# Patient Record
Sex: MALE | Race: WHITE | Employment: FULL TIME | URBAN - METROPOLITAN AREA
[De-identification: names, ages, dates, MRNs, and addresses within clinical notes are randomized per-mention and may not be internally consistent; named-entity substitution may affect disease eponyms.]

---

## 2021-06-14 ENCOUNTER — OFFICE VISIT (OUTPATIENT)
Dept: PRIMARY CARE CLINIC | Age: 36
End: 2021-06-14
Payer: COMMERCIAL

## 2021-06-14 VITALS
BODY MASS INDEX: 27.35 KG/M2 | TEMPERATURE: 98 F | DIASTOLIC BLOOD PRESSURE: 67 MMHG | WEIGHT: 220 LBS | SYSTOLIC BLOOD PRESSURE: 108 MMHG | HEIGHT: 75 IN | HEART RATE: 75 BPM | OXYGEN SATURATION: 100 %

## 2021-06-14 DIAGNOSIS — Z11.59 NEED FOR HEPATITIS C SCREENING TEST: ICD-10-CM

## 2021-06-14 DIAGNOSIS — Z98.890 HISTORY OF REPAIR OF ANTERIOR CRUCIATE LIGAMENT OF LEFT KNEE: ICD-10-CM

## 2021-06-14 DIAGNOSIS — N53.9 MALE SEXUAL DYSFUNCTION: ICD-10-CM

## 2021-06-14 DIAGNOSIS — Z91.09 ENVIRONMENTAL ALLERGIES: ICD-10-CM

## 2021-06-14 DIAGNOSIS — M25.562 CHRONIC PAIN OF LEFT KNEE: ICD-10-CM

## 2021-06-14 DIAGNOSIS — M25.362 INSTABILITY OF KNEE JOINT, LEFT: Primary | ICD-10-CM

## 2021-06-14 DIAGNOSIS — Z00.00 ANNUAL PHYSICAL EXAM: ICD-10-CM

## 2021-06-14 DIAGNOSIS — G89.29 CHRONIC PAIN OF LEFT KNEE: ICD-10-CM

## 2021-06-14 PROCEDURE — 99203 OFFICE O/P NEW LOW 30 MIN: CPT | Performed by: FAMILY MEDICINE

## 2021-06-14 PROCEDURE — 99385 PREV VISIT NEW AGE 18-39: CPT | Performed by: FAMILY MEDICINE

## 2021-06-14 RX ORDER — FLUTICASONE PROPIONATE 50 MCG
1 SPRAY, SUSPENSION (ML) NASAL DAILY
COMMUNITY

## 2021-06-14 RX ORDER — SILDENAFIL 100 MG/1
100 TABLET, FILM COATED ORAL DAILY PRN
Qty: 10 TABLET | Refills: 1 | Status: SHIPPED | OUTPATIENT
Start: 2021-06-14 | End: 2022-08-11 | Stop reason: ALTCHOICE

## 2021-06-14 ASSESSMENT — PATIENT HEALTH QUESTIONNAIRE - PHQ9
SUM OF ALL RESPONSES TO PHQ QUESTIONS 1-9: 0
SUM OF ALL RESPONSES TO PHQ9 QUESTIONS 1 & 2: 0
SUM OF ALL RESPONSES TO PHQ QUESTIONS 1-9: 0
2. FEELING DOWN, DEPRESSED OR HOPELESS: 0
SUM OF ALL RESPONSES TO PHQ QUESTIONS 1-9: 0
1. LITTLE INTEREST OR PLEASURE IN DOING THINGS: 0

## 2021-06-14 ASSESSMENT — ENCOUNTER SYMPTOMS
COUGH: 0
ABDOMINAL PAIN: 0
SORE THROAT: 0
NAUSEA: 0
SHORTNESS OF BREATH: 0

## 2021-06-15 DIAGNOSIS — Z00.00 ANNUAL PHYSICAL EXAM: ICD-10-CM

## 2021-06-15 DIAGNOSIS — Z11.59 NEED FOR HEPATITIS C SCREENING TEST: ICD-10-CM

## 2021-06-15 PROBLEM — D69.6 TEMPORARY LOW PLATELET COUNT (HCC): Status: ACTIVE | Noted: 2021-06-15

## 2021-06-15 LAB
A/G RATIO: 2 (ref 1.1–2.2)
ALBUMIN SERPL-MCNC: 4.7 G/DL (ref 3.4–5)
ALP BLD-CCNC: 53 U/L (ref 40–129)
ALT SERPL-CCNC: 23 U/L (ref 10–40)
ANION GAP SERPL CALCULATED.3IONS-SCNC: 11 MMOL/L (ref 3–16)
AST SERPL-CCNC: 25 U/L (ref 15–37)
BASOPHILS ABSOLUTE: 0 K/UL (ref 0–0.2)
BASOPHILS RELATIVE PERCENT: 1 %
BILIRUB SERPL-MCNC: 0.6 MG/DL (ref 0–1)
BUN BLDV-MCNC: 15 MG/DL (ref 7–20)
CALCIUM SERPL-MCNC: 9.4 MG/DL (ref 8.3–10.6)
CHLORIDE BLD-SCNC: 105 MMOL/L (ref 99–110)
CHOLESTEROL, FASTING: 192 MG/DL (ref 0–199)
CO2: 26 MMOL/L (ref 21–32)
CREAT SERPL-MCNC: 0.9 MG/DL (ref 0.9–1.3)
EOSINOPHILS ABSOLUTE: 0.1 K/UL (ref 0–0.6)
EOSINOPHILS RELATIVE PERCENT: 3.1 %
GFR AFRICAN AMERICAN: >60
GFR NON-AFRICAN AMERICAN: >60
GLOBULIN: 2.3 G/DL
GLUCOSE BLD-MCNC: 92 MG/DL (ref 70–99)
HCT VFR BLD CALC: 45.8 % (ref 40.5–52.5)
HDLC SERPL-MCNC: 47 MG/DL (ref 40–60)
HEMOGLOBIN: 15.5 G/DL (ref 13.5–17.5)
HEPATITIS C ANTIBODY INTERPRETATION: NORMAL
LDL CHOLESTEROL CALCULATED: 116 MG/DL
LYMPHOCYTES ABSOLUTE: 1.4 K/UL (ref 1–5.1)
LYMPHOCYTES RELATIVE PERCENT: 39 %
MCH RBC QN AUTO: 30.7 PG (ref 26–34)
MCHC RBC AUTO-ENTMCNC: 33.9 G/DL (ref 31–36)
MCV RBC AUTO: 90.5 FL (ref 80–100)
MONOCYTES ABSOLUTE: 0.3 K/UL (ref 0–1.3)
MONOCYTES RELATIVE PERCENT: 9.3 %
NEUTROPHILS ABSOLUTE: 1.7 K/UL (ref 1.7–7.7)
NEUTROPHILS RELATIVE PERCENT: 47.6 %
PDW BLD-RTO: 13.7 % (ref 12.4–15.4)
PLATELET # BLD: 128 K/UL (ref 135–450)
PMV BLD AUTO: 11.1 FL (ref 5–10.5)
POTASSIUM SERPL-SCNC: 4.2 MMOL/L (ref 3.5–5.1)
RBC # BLD: 5.06 M/UL (ref 4.2–5.9)
SODIUM BLD-SCNC: 142 MMOL/L (ref 136–145)
TOTAL PROTEIN: 7 G/DL (ref 6.4–8.2)
TRIGLYCERIDE, FASTING: 143 MG/DL (ref 0–150)
VLDLC SERPL CALC-MCNC: 29 MG/DL
WBC # BLD: 3.6 K/UL (ref 4–11)

## 2021-06-16 ENCOUNTER — OFFICE VISIT (OUTPATIENT)
Dept: ORTHOPEDIC SURGERY | Age: 36
End: 2021-06-16
Payer: COMMERCIAL

## 2021-06-16 VITALS — HEIGHT: 74 IN | BODY MASS INDEX: 28.23 KG/M2 | TEMPERATURE: 98 F | WEIGHT: 220 LBS

## 2021-06-16 DIAGNOSIS — M25.562 LEFT KNEE PAIN, UNSPECIFIED CHRONICITY: Primary | ICD-10-CM

## 2021-06-16 DIAGNOSIS — M17.12 PATELLOFEMORAL ARTHRITIS OF LEFT KNEE: ICD-10-CM

## 2021-06-16 DIAGNOSIS — S83.512A CHRONIC RUPTURE OF ANTERIOR CRUCIATE LIGAMENT OF LEFT KNEE: ICD-10-CM

## 2021-06-16 PROCEDURE — 99204 OFFICE O/P NEW MOD 45 MIN: CPT | Performed by: ORTHOPAEDIC SURGERY

## 2021-06-16 ASSESSMENT — ENCOUNTER SYMPTOMS
CONSTIPATION: 0
EYE ITCHING: 0
EYE PAIN: 0
WHEEZING: 0
DIARRHEA: 0
VOMITING: 0

## 2021-06-16 NOTE — PROGRESS NOTES
Date:  2021    Name:  Sultana Marcial  Address:  [address] [address]    :  1985      Age:   37 y.o.    SSN:  [id number]      Medical Record Number:  [id number]    Reason for Visit:    Chief Complaint    Knee Pain (new patient left knee. )      DOS:2021     HPI: Sultana Marcial is a 37 y.o. male here today for dilation of his left knee. Patient states 8 years ago he underwent an ACL reconstruction using hamstring autograft by Dr. Candace Mann. He states postoperatively he never felt 100% which he says he never got his full range of motion or full strength and did have feelings of instability after the surgery. He does admit to an injury where he felt a pop in the postoperative period but states he was evaluated by his physician and told that everything was okay. Since that time he has continued to have feelings of instability, loss of range of motion and pain. Locates the pain to the anterior aspect of the knee and states pain is worse with stairs. States when walking or doing daily activities does not have much issues however he cannot do any sort of cutting or cytocide activities his knee feels unstable. Today he is just modified his activities to avoid these situations were his knee is aggravated. Has not tried any real icing or anti-inflammatory medications at this point. ROS: All systems reviewed on patient intake form. Pertinent items are noted in HPI.         Past Medical History:   Diagnosis Date    Environmental allergies 2021        Past Surgical History:   Procedure Laterality Date    ANTERIOR CRUCIATE LIGAMENT REPAIR  3/22/2012    left knee    INGUINAL HERNIA REPAIR Right        Family History   Problem Relation Age of Onset    Arrhythmia Mother         A Fib    No Known Problems Brother     Heart Disease Maternal Grandfather        Social History     Socioeconomic History    Marital status:      Spouse name: Not on file    Number of children: 0 oriented x 3,  normal,  no focal deficits noted. Normal affect. Eyes: sclera clear  Ears: Normal external ear  Mouth:  No perioral lesions  Pulm: Respirations unlabored and regular  Pulse: Regular rate    Skin: Warm, well perfused        Left knee exam    Gait: No use of assistive devices. No antalgic gait. Alignment: normal alignment. Inspection/skin: Skin is intact without erythema or ecchymosis. No gross deformity. Palpation: Moderate patellofemoral crepitation, no medial or lateral joint line tenderness    Range of Motion: There is full range of motion. Strength: Quadriceps atrophy noted. Contralateral extremity    Effusion: No effusion or swelling present. Ligamentous stability: Positive Lachman, grade 2 pivot    Neurologic and vascular: Skin is warm and well-perfused. Sensation is intact to light-touch. Special tests: Negative Eh sign, positive patellar compression. Right knee exam    Gait: No use of assistive devices. No antalgic gait. Alignment: normal alignment. Inspection/skin: Skin is intact without erythema or ecchymosis. No gross deformity. Palpation: no crepitus. no joint line tenderness present. Range of Motion: There is full range of motion. Strength: Normal quadriceps development. Effusion: No effusion or swelling present. Ligamentous stability: No cruciate or collateral ligament instability. Neurologic and vascular: Skin is warm and well-perfused. Sensation is intact to light-touch. Special tests: Negative Eh sign. Diagnostics:  Radiology:     Radiographs were obtained and reviewed in the office; 4 views: bilateral PA, bilateral Gurmeet Heron, bilateral [employer] AND left lateral    Impression: Patient has bilateral lateral patellar tilt with lateral patellofemoral joint space narrowing. Otherwise has maintained medial lateral joint spaces. No other obvious bony abnormalities.   Graft tunnels in good

## 2021-06-23 ENCOUNTER — OFFICE VISIT (OUTPATIENT)
Dept: ORTHOPEDIC SURGERY | Age: 36
End: 2021-06-23
Payer: COMMERCIAL

## 2021-06-23 VITALS — WEIGHT: 220 LBS | TEMPERATURE: 97.8 F | HEIGHT: 74 IN | BODY MASS INDEX: 28.23 KG/M2

## 2021-06-23 DIAGNOSIS — M17.12 PATELLOFEMORAL ARTHRITIS OF LEFT KNEE: Primary | ICD-10-CM

## 2021-06-23 DIAGNOSIS — M25.362: ICD-10-CM

## 2021-06-23 PROCEDURE — 99214 OFFICE O/P EST MOD 30 MIN: CPT | Performed by: ORTHOPAEDIC SURGERY

## 2021-06-23 NOTE — PROGRESS NOTES
Chief Complaint  Follow-up (MRI RESULTS LEFT KNEE)      History of Present Illness:  Becka Garcia is a pleasant 37 y.o. male who presents today for follow up evaluation of left knee pain. He is here to review MRI results. Patient states 8 years ago he underwent an ACL reconstruction using hamstring autograft by Dr. Mercy Orr. He states postoperatively he never felt 100% which he says he never got his full range of motion or full strength and did have feelings of instability after the surgery. He does admit to an injury where he felt a pop in the postoperative period but states he was evaluated by his physician and told that everything was okay.       Since that time he has continued to have feelings of instability, loss of range of motion and pain. Locates the pain to the anterior aspect of the knee and states pain is worse with stairs. States when walking or doing daily activities does not have much issues however he cannot do any sort of cutting or cytocide activities his knee feels unstable. Today he is just modified his activities to avoid these situations where his knee is aggravated. Pain has been unchanged over the last 3 years. Has not tried any real icing or anti-inflammatory medications at this point. Medical History:  Patient's medications, allergies, past medical, surgical, social and family histories were reviewed and updated as appropriate. Pertinent items are noted in HPI  Review of systems reviewed from Patient History Form completed today and available in the patient's chart under the Media tab.          Pain Assessment  Location of Pain: Knee  Location Modifiers: Left  Severity of Pain: 5  Quality of Pain: Dull, Aching  Duration of Pain: Persistent  Frequency of Pain: Intermittent  Aggravating Factors: Stairs (running, lateral)  Limiting Behavior: Yes  Relieving Factors: Rest  Result of Injury: Yes  Work-Related Injury: No  Are there other pain locations you wish to document?: No    Past Medical History:   Diagnosis Date    Environmental allergies 6/14/2021        Past Surgical History:   Procedure Laterality Date    ANTERIOR CRUCIATE LIGAMENT REPAIR  3/22/2012    left knee    INGUINAL HERNIA REPAIR Right        Family History   Problem Relation Age of Onset    Arrhythmia Mother         A Fib    No Known Problems Brother     Heart Disease Maternal Grandfather        Social History     Socioeconomic History    Marital status:      Spouse name: None    Number of children: 0    Years of education: None    Highest education level: None   Occupational History    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, helping startups   Tobacco Use    Smoking status: Never Smoker    Smokeless tobacco: Never Used   Substance and Sexual Activity    Alcohol use: Yes     Alcohol/week: 0.0 standard drinks     Types: 5 - 8 Cans of beer per week     Comment: weekends    Drug use: No    Sexual activity: Yes     Partners: Female   Other Topics Concern    None   Social History Narrative    None     Social Determinants of Health     Financial Resource Strain:     Difficulty of Paying Living Expenses:    Food Insecurity:     Worried About Running Out of Food in the Last Year:     Ran Out of Food in the Last Year:    Transportation Needs:     Lack of Transportation (Medical):      Lack of Transportation (Non-Medical):    Physical Activity:     Days of Exercise per Week:     Minutes of Exercise per Session:    Stress:     Feeling of Stress :    Social Connections:     Frequency of Communication with Friends and Family:     Frequency of Social Gatherings with Friends and Family:     Attends Hoahaoism Services:     Active Member of Clubs or Organizations:     Attends Club or Organization Meetings:     Marital Status:    Intimate Partner Violence:     Fear of Current or Ex-Partner:     Emotionally Abused:     Physically Abused:     Sexually Abused:        Current Outpatient Medications   Medication Sig Dispense Refill    Cetirizine HCl (ZYRTEC PO) Take by mouth      fluticasone (FLONASE) 50 MCG/ACT nasal spray 1 spray by Each Nostril route daily      sildenafil (VIAGRA) 100 MG tablet Take 1 tablet by mouth daily as needed for Erectile Dysfunction 10 tablet 1     No current facility-administered medications for this visit. No Known Allergies    Vital signs:  Temp 97.8 °F (36.6 °C)   Ht 6' 2\" (1.88 m)   Wt 220 lb (99.8 kg)   BMI 28.25 kg/m²              LEFT Knee Exam:     Gait: No use of assistive devices. No antalgic gait. Alignment: normal alignment. Inspection/skin: Skin is intact without erythema or ecchymosis. No gross deformity. Healed surgical incisions. Palpation: Marked patella crepitus. no joint line tenderness present. Range of Motion: There is full range of motion. Strength: Significant quad atrophy. Effusion: No effusion or swelling present. Ligamentous stability: No cruciate or collateral ligament instability. Neurologic and vascular: Skin is warm and well-perfused. Sensation is intact to light-touch. Special tests: Negative Eh sign. Grade 2A Pivot. RIGHT Knee Exam:    Gait: No use of assistive devices. No antalgic gait. Alignment: normal alignment. Inspection/skin: Skin is intact without erythema or ecchymosis. No gross deformity. Palpation: no crepitus. no joint line tenderness present. Range of Motion: There is full range of motion. Strength: Normal quadriceps development. Effusion: No effusion or swelling present. Ligamentous stability: No cruciate or collateral ligament instability. Neurologic and vascular: Skin is warm and well-perfused. Sensation is intact to light-touch. Special tests: Negative Eh sign. Radiology:     Pertinent imaging was interpreted and reviewed with the patient, both images and report.      MRI of the left knee dated 6/21/2021 was interpreted and reviewed with the patient today.  CONCLUSION:   1. Intermediate-grade-to-high-grade chondral irregularity involves the central and medial    patellar cartilage. Edema changes deep to this either reflect contusion or stress change with    the former favored. 2. Intact ACL graft. 3. No meniscal or collateral ligament tear. 4. Chronic MCL sprain, scarring and capsulitis. Assessment :  S/P ACL reconstruction with residual rotatory instability of left knee; Patellofemoral arthritis of left knee    Impression:  Encounter Diagnoses   Name Primary?  Patellofemoral arthritis of left knee Yes    Anteromedial rotatory instability of knee, left        Office Procedures:  No orders of the defined types were placed in this encounter. Plan: Pertinent imaging was reviewed. The etiology, natural history, and treatment options for the disorder were discussed. The roles of activity medication, antiinflammatories, injections, bracing, physical therapy, and surgical interventions were all described to the patient and questions were answered. MRI shows his graft intact but the placement is more vertical causing some rotatory instability. He also has significant chondral irregularity of the patellar cartilage. He has not had any recent formal treatment. He would like to get back to running in a straight line but his ultimate goal is to return to sports. I believe he is a candidate to start with formal, supervised physical therapy for rotatory instability and patellofemoral arthritis building quadriceps strength. He wishes to proceed. I will see him back in 1 month, sooner if symptoms worsen. Roby Ferris is in agreement with this plan. All questions were answered to patient's satisfaction and was encouraged to call with any further questions. Total time spent for evaluation, education and development of treatment plan: 39 minutes        Alejandra VIRK ATC, am scribing for and in the presence of Dr. Elisa Garcia. 06/23/21 10:13 AM Alejandra Barney ATC    I attest that I met personally with the patient, performed the described exam, reviewed the radiographic studies and medical records associated with this patient and supervised the services that are described above.      Luis Copeland MD

## 2021-06-23 NOTE — LETTER
PRESCRIPTION FOR PHYSICAL THERAPY    Patient Name: Becka Garcia MRN: [id number]  DOS: 6/23/2021   Diagnosis:   1. Patellofemoral arthritis of left knee    2. Anteromedial rotatory instability of knee, left                           Surgical Procedure:          Surgical Date:   Goal:  [x]Decrease Pain and/or Swelling [x]Increase ROM and/or Flexibility     []Increase Function                           [x]Increase Strength and/or Endurance   []Other   Evaluation:  [x]Evaluation and Treatment []KT-1000   []Isokinetic Exam   []Preoperative Eval    Recommended Modalities:  [x]Modalities of Choice      []HCVS            []Electrical Stimulation     [] Remove Dressing  []Ultrasound        []TENS/TNS    [] Lumbar Traction           [] Cervical Traction   []Phonophoresis         []Hot Pack/Cold Pack   []PT Treatment, Unlisted []Other:  Therapeutic Exercises:    []Isometrics    []Range of Motion []Progressive Exer. []Balance Coordination   []Flexibility  []ROM Limited  []Total Hip Replacement   []Passive  []ROM Full   []Total Knee Replacement  []Active Assisted    []Shoulder Impingement Prog  []Active   []Tennis Elbow Program   []Capsular Shift Regular        []Isokinetics      []Spine Program   []Straight Leg Raises  [] Gait    []Fixation                   [] Supine                                              [] Running   [] Extension   [] Prone   [] Throwing   [] Stabilization   [] AB    [] Moroccan Kevon Shireen   [] AD      [] Spine Eval   [] Cervical Eval  [] Conditioning   [] Lumbar  [] Stationary Bike   [] [employer] Track   [] Lumbar Exer.  [] Stairmaster         [] Treadmill   [] Functional Cap [] Aquatic Prog.      [] Return to work    Treatment Program:  Frequency: [] 1x  [x] 2x  [] 3x  [] 4x  [] 5x week/month  Duration: [] 1  [] 2  [] 3  [x] 4  [] 5 week/month  Weight Bearing: [] Non  [] 1/4  [] 1/2  [] 3/4  [] Full  ROM: [] Restricted  [] Full  [x] Follow established:  Patellofemoral arthritis and rotatory instability following ACL reconstruction with hamstring graft      [] Other:

## 2021-06-30 ENCOUNTER — HOSPITAL ENCOUNTER (OUTPATIENT)
Dept: PHYSICAL THERAPY | Age: 36
Setting detail: THERAPIES SERIES
Discharge: HOME | End: 2021-06-30
Payer: COMMERCIAL

## 2021-06-30 PROCEDURE — 97110 THERAPEUTIC EXERCISES: CPT | Performed by: PHYSICAL THERAPIST

## 2021-06-30 PROCEDURE — 97161 PT EVAL LOW COMPLEX 20 MIN: CPT | Performed by: PHYSICAL THERAPIST

## 2021-06-30 NOTE — PLAN OF CARE
The [address] [facility],[address], [address] [facility], [address] [place], [place]     Physical Therapy Certification    Dear Referring Practitioner: John Earl,    We had the pleasure of evaluating the following patient for physical therapy services at [address] [place] [address]. A summary of our findings can be found in the initial assessment below. This includes our plan of care. If you have any questions or concerns regarding these findings, please do not hesitate to contact me at the office phone number checked above. Thank you for the referral.       Physician Signature:_______________________________Date:__________________  By signing above (or electronic signature), therapists plan is approved by physician    Patient: Ruth Roach   : 1985   MRN: [id number]  Referring Physician: Referring Practitioner: John Earl      Evaluation Date: 2021      Medical Diagnosis Information:  Diagnosis: Anteriomedial rotary instability of the left knee (M25.362), left knee patellofemoral arthritis (M17.12)   Treatment Diagnosis: left knee pain (M25.562), left knee instability (M25.362)                                         Insurance information: PT Insurance Information: [employer]     Precautions/ Contra-indications:     C-SSRS Triggered by Intake questionnaire (Past 2 wk assessment):   [x] No, Questionnaire did not trigger screening.   [] Yes, Patient intake triggered further evaluation      [] C-SSRS Screening completed  [] PCP notified via Plan of Care  [] Emergency services notified     Latex Allergy:  [x]NO      []YES  Preferred Language for Healthcare:   [x]English       []other:    SUBJECTIVE: Patient stated complaint: Patient reports he has a chronic history of knee pain since  after he tore his ACL playing basketball.  Had surgery to repair PT and did physical therapy, but never really felt like he got back to normal. Reports that he has felt a sense of instability in his knee. Cannot do higher level activities such as running or playing sports. Plays golf, but knee gets sore. Reports that the past couple of years he has noticed increased pain under the kneecap. MRI showed ACL is intact but the insertion was set higher than normal.     Relevant Medical History: no significant PMH  Functional Disability Index: LEFS 52/80 (35%)    Height: 6'2 Weight: 220  Pain Scale: 0-7/10   Easing factors: rest  Provocative factors: stairs, squatting, walking up/down inclines, running     Type: []Constant   [x]Intermittent  []Radiating []Localized []other:     Numbness/Tingling: denies N/T    Occupation/School: desk job    Living Status/Prior Level of Function: Independent with ADLs and IADLs, lives with wife in two level house; plays golf 2-3x/wk; walks dog for exercise; would like to get back to running and playing basketball if possible (currently unable)    OBJECTIVE:     ROM LEFT RIGHT   HIP Flex     HIP Abd     HIP Ext     HIP IR     HIP ER     Knee ext 0 0   Knee Flex 129 133   Ankle PF     Ankle DF     Ankle In     Ankle Ev     Strength  LEFT RIGHT   HIP Flexors 4/5 4+/5   HIP Abductors 4/5 4/5   HIP Ext     Hip ER 4-/5 4+/5   Knee EXT (quad) 4/5 5/5   Knee Flex (HS) 4/5 5/5   Ankle DF 4+/5 4+/5   Ankle PF     Ankle Inv     Ankle EV          Circumference  Mid apex  7 cm prox             Reflexes/Sensation: DNT this date, no neuro sxs   []Dermatomes/Myotomes intact    []Reflexes equal and normal bilaterally   []Other:    Joint mobility:    [x]Normal    []Hypo   []Hyper    Palpation: palpable tightness distal quad    Functional Mobility/Transfers: independent    Posture: fair    Bandages/Dressings/Incisions: N/A    Gait: (include devices/WB status) Ambulating with slight decrease in stance time LLE    Orthopedic Special Tests: none this date                       [x] Patient history, allergies, meds reviewed. Medical chart reviewed. See intake form.      Review Of Systems (ROS):  [x]Performed Review of systems (Integumentary, CardioPulmonary, Neurological) by intake and observation. Intake form has been scanned into medical record. Patient has been instructed to contact their primary care physician regarding ROS issues if not already being addressed at this time. Co-morbidities/Complexities (which will affect course of rehabilitation):   [x]None           Arthritic conditions   []Rheumatoid arthritis (M05.9)  []Osteoarthritis (M19.91)   Cardiovascular conditions   []Hypertension (I10)  []Hyperlipidemia (E78.5)  []Angina pectoris (I20)  []Atherosclerosis (I70)   Musculoskeletal conditions   []Disc pathology   []Congenital spine pathologies   []Prior surgical intervention  []Osteoporosis (M81.8)  []Osteopenia (M85.8)   Endocrine conditions   []Hypothyroid (E03.9)  []Hyperthyroid Gastrointestinal conditions   []Constipation (J41.26)   Metabolic conditions   []Morbid obesity (E66.01)  []Diabetes type 1(E10.65) or 2 (E11.65)   []Neuropathy (G60.9)     Pulmonary conditions   []Asthma (J45)  []Coughing   []COPD (J44.9)   Psychological Disorders  []Anxiety (F41.9)  []Depression (F32.9)   []Other:   []Other:          Barriers to/and or personal factors that will affect rehab potential:              [x]Age  []Sex              []Motivation/Lack of Motivation                        [x]Co-Morbidities              []Cognitive Function, education/learning barriers              []Environmental, home barriers              []profession/work barriers  []past PT/medical experience  []other:  Justification: should progress well    Falls Risk Assessment (30 days):   [x] Falls Risk assessed and no intervention required.   [] Falls Risk assessed and Patient requires intervention due to being higher risk   TUG score (>12s at risk):     [] Falls education provided, including         ASSESSMENT: Patient demonstrates decreased strength and stability in his LLE, limiting his ability to ascend/descend stairs pathology which may benefit from Dry needling      []other:      Prognosis/Rehab Potential:      []Excellent   [x]Good    []Fair   []Poor    Tolerance of evaluation/treatment:    []Excellent   [x]Good    []Fair   []Poor  Physical Therapy Evaluation Complexity Justification  [x] A history of present problem with:  [x] no personal factors and/or comorbidities that impact the plan of care;  []1-2 personal factors and/or comorbidities that impact the plan of care  []3 personal factors and/or comorbidities that impact the plan of care  [x] An examination of body systems using standardized tests and measures addressing any of the following: body structures and functions (impairments), activity limitations, and/or participation restrictions;:  [] a total of 1-2 or more elements   [x] a total of 3 or more elements   [] a total of 4 or more elements   [x] A clinical presentation with:  [x] stable and/or uncomplicated characteristics   [] evolving clinical presentation with changing characteristics  [] unstable and unpredictable characteristics;   [x] Clinical decision making of [x] low, [] moderate, [] high complexity using standardized patient assessment instrument and/or measurable assessment of functional outcome. [x] EVAL (LOW) 30361 (typically 20 minutes face-to-face)  [] EVAL (MOD) 74360 (typically 30 minutes face-to-face)  [] EVAL (HIGH) 38861 (typically 45 minutes face-to-face)  [] RE-EVAL       PLAN:   Frequency/Duration:  1 days per week for 8 Weeks:  Interventions:  [x]  Therapeutic exercise including: strength training, ROM, for Lower extremity and core   [x]  NMR activation and proprioception for LE, Glutes and Core   [x]  Manual therapy as indicated for LE, Hip and spine to include: Dry Needling/IASTM, STM, PROM, Gr I-IV mobilizations, manipulation.    [x] Modalities as needed that may include: thermal agents, E-stim, Biofeedback, US, iontophoresis as indicated  [x] Patient education on joint protection, postural re-education, activity modification, progression of HEP. HEP instruction:   Access Code: [email or web address]  URL: [email or web address]  Date: 06/30/2021  Prepared by: Chirag Gay    GOALS:  Patient stated goal: \"To be able to climb stairs without pain. To get back to running/basketball. \"  [] Progressing: [] Met: [] Not Met: [] Adjusted    Therapist goals for Patient:   Short Term Goals: To be achieved in: 2 weeks  1. Independent in HEP and progression per patient tolerance, in order to prevent re-injury. [] Progressing: [] Met: [] Not Met: [] Adjusted  2. Patient will have a decrease in pain to facilitate improvement in movement, function, and ADLs as indicated by Functional Deficits. [] Progressing: [] Met: [] Not Met: [] Adjusted    Long Term Goals: To be achieved in: 8 weeks  1. Disability index score of 16% or less for the LEFS to assist with reaching prior level of function. [] Progressing: [] Met: [] Not Met: [] Adjusted  2. Patient will demonstrate increased AROM of left knee flexion to 130 degrees to allow for proper joint functioning as indicated by patients Functional Deficits. [] Progressing: [] Met: [] Not Met: [] Adjusted  3. Patient will demonstrate an increase in Strength in left hip flexion, ER, and abduction to 4+/5 and knee flexion and extension to 5/5 to allow for proper functional mobility as indicated by patients Functional Deficits. [] Progressing: [] Met: [] Not Met: [] Adjusted  4. Patient will be able to ascend/descend stairs reciprocally without increased symptoms or restriction. [] Progressing: [] Met: [] Not Met: [] Adjusted  5.  Patient will be able to run one mile without increased symptoms or restriction or progress to [employer] as appropriate for return to higher level activities. (patient specific functional goal)    [] Progressing: [] Met: [] Not Met: [] Adjusted     Electronically signed by:  Ori Young PT, DPT [id number]

## 2021-06-30 NOTE — FLOWSHEET NOTE
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), left knee patellofemoral arthritis (M17.12)  · Treatment Diagnosis: left knee pain (M25.562), left knee instability (I24.263)  Insurance/Certification information:  PT Insurance Information: [employer]  Physician Information:  Referring Practitioner: Yohannes Joyce  Has the plan of care been signed (Y/N):        []  Yes  [x]  No     Date of Patient follow up with Physician:  2021      Is this a Progress Report:     []  Yes  [x]  No        If Yes:  Date Range for reporting period:  Beginnin2021  Ending    Progress report will be due (10 Rx or 30 days whichever is less):        Recertification will be due (POC Duration  / 90 days whichever is less):       Visit # Insurance Allowable Auth Required   In-person 1  [x]  Yes []  No    Telehealth   []  Yes []  No    Total          Functional Scale:  LEFS 52/80 (35%)    Date assessed:  2021      Therapy Diagnosis/Practice Pattern: D      Number of Comorbidities:  [x]0     []1-2    []3+    Latex Allergy:  [x]NO      []YES  Preferred Language for Healthcare:   [x]English       []other:      Pain level:  0-7/10     SUBJECTIVE:  See eval    OBJECTIVE: See eval   Observation:    Test measurements:      RESTRICTIONS/PRECAUTIONS:     Exercises/Interventions:     Therapeutic Ex (35702) Sets/sec Reps Notes/CUES   HS stretch tableside 30\" 3x    Standing gastroc stretch 30\" 3x    Prone quad stretch 30\" 3x    Quad sets 5\" 5x To perform at home   SLR flexion 2 10    SLR abd 2 10    Clamshells RTB 3\" 20x    Bridging with ABD 5\" 20x                Patient ed   HEP, POC, relationship between hip strength and knee stability, knee valgus with squatting and balance, GAP         Manual Intervention (37281)                                          NMR re-education (84120)   CUES NEEDED   SLB on airex 15\" 5x    Step to SLB Demo for progression if not painful                                               Therapeutic Activity (49737)                                          HEP instruction:   Access Code: [email or web address]  URL: [email or web address]  Date: 06/30/2021  Prepared by: Chelsey Kenny      Therapeutic Exercise and NMR EXR  [x] (39308) Provided verbal/tactile cueing for activities related to strengthening, flexibility, endurance, ROM for improvements in LE, proximal hip, and core control with self care, mobility, lifting, ambulation.  [] (45169) Provided verbal/tactile cueing for activities related to improving balance, coordination, kinesthetic sense, posture, motor skill, proprioception  to assist with LE, proximal hip, and core control in self care, mobility, lifting, ambulation and eccentric single leg control.      NMR and Therapeutic Activities:    [x] (04701 or 49888) Provided verbal/tactile cueing for activities related to improving balance, coordination, kinesthetic sense, posture, motor skill, proprioception and motor activation to allow for proper function of core, proximal hip and LE with self care and ADLs  [] (86997) Gait Re-education- Provided training and instruction to the patient for proper LE, core and proximal hip recruitment and positioning and eccentric body weight control with ambulation re-education including up and down stairs     Home Exercise Program:    [x] (13388) Reviewed/Progressed HEP activities related to strengthening, flexibility, endurance, ROM of core, proximal hip and LE for functional self-care, mobility, lifting and ambulation/stair navigation   [] (43111)Reviewed/Progressed HEP activities related to improving balance, coordination, kinesthetic sense, posture, motor skill, proprioception of core, proximal hip and LE for self care, mobility, lifting, and ambulation/stair navigation      Manual Treatments:  PROM / STM / Oscillations-Mobs:  G-I, II, III, IV (PA's, Inf., Post.)  [] (43725) Provided manual therapy to mobilize LE, proximal hip and/or LS spine soft tissue/joints for the purpose of modulating pain, promoting relaxation,  increasing ROM, reducing/eliminating soft tissue swelling/inflammation/restriction, improving soft tissue extensibility and allowing for proper ROM for normal function with self care, mobility, lifting and ambulation. Modalities:  None this date   [] GAME READY (VASO)- for significant edema, swelling, pain control. Charges  Timed Code Treatment Minutes: 24'   Total Treatment Minutes: 45'     [x] EVAL (LOW) 43589   [] EVAL (MOD) 00922  [] EVAL (HIGH) 17676   [] RE-EVAL     [x] KM(76708) x  2  [] IONTO  [] NMR (17286) x     [] VASO  [] Manual (94660) x      [] Other:  [] TA x      [] Mech Traction (32585)  [] ES(attended) (96037)      [] ES (un) (98729):     GOALS:  Patient stated goal: \"To be able to climb stairs without pain. To get back to running/basketball. \"  [] Progressing: [] Met: [] Not Met: [] Adjusted    Therapist goals for Patient:   Short Term Goals: To be achieved in: 2 weeks  1. Independent in HEP and progression per patient tolerance, in order to prevent re-injury. [] Progressing: [] Met: [] Not Met: [] Adjusted  2. Patient will have a decrease in pain to facilitate improvement in movement, function, and ADLs as indicated by Functional Deficits. [] Progressing: [] Met: [] Not Met: [] Adjusted    Long Term Goals: To be achieved in: 8 weeks  1. Disability index score of 16% or less for the LEFS to assist with reaching prior level of function. [] Progressing: [] Met: [] Not Met: [] Adjusted  2.  Patient will demonstrate increased AROM of left knee flexion to 130 degrees to allow for proper joint functioning as indicated by patients Functional Deficits. [] Progressing: [] Met: [] Not Met: [] Adjusted  3. Patient will demonstrate an increase in Strength in left hip flexion, ER, and abduction to 4+/5 and knee flexion and extension to 5/5 to allow for proper functional mobility as indicated by patients Functional Deficits. [] Progressing: [] Met: [] Not Met: [] Adjusted  4. Patient will be able to ascend/descend stairs reciprocally without increased symptoms or restriction. [] Progressing: [] Met: [] Not Met: [] Adjusted  5. Patient will be able to run one mile without increased symptoms or restriction or progress to [facility] as appropriate for return to higher level activities. (patient specific functional goal)    [] Progressing: [] Met: [] Not Met: [] Adjusted     Progression Towards Functional goals:  [] Patient is progressing as expected towards functional goals listed. [] Progression is slowed due to complexities listed. [] Progression has been slowed due to co-morbidities. [x] Plan just implemented, too soon to assess goals progression  [] Other:     Overall Progression Towards Functional goals/ Treatment Progress Update:  [] Patient is progressing as expected towards functional goals listed. [] Progression is slowed due to complexities/Impairments listed. [] Progression has been slowed due to co-morbidities.   [x] Plan just implemented, too soon to assess goals progression <30days   [] Goals require adjustment due to lack of progress  [] Patient is not progressing as expected and requires additional follow up with physician  [] Other    Prognosis for POC: [x] Good [] Fair  [] Poor      Patient requires continued skilled intervention: [x] Yes  [] No    Treatment/Activity Tolerance:  [x] Patient able to complete treatment  [] Patient limited by fatigue  [] Patient limited by pain    [] Patient limited by other medical complications  [] Other:     ASSESSMENT:  See eval     Return to Play: (if applicable)   [x]  Stage 1: Intro to Strength   []  Stage 2: Return to Run and Strength   []  Stage 3: Return to Jump and Strength   []  Stage 4: Dynamic Strength and Agility   []  Stage 5: Sport Specific Training     []  Ready to Return to Play, Meets All Above Stages   []  Not Ready for Return to Sports   Comments:                          PLAN: See eval  [] Continue per plan of care [] Alter current plan (see comments above)  [x] Plan of care initiated [] Hold pending MD visit [] Discharge      Electronically signed by:  Nioclette Stein PT, DPT [id number]     Note: If patient does not return for scheduled/ recommended follow up visits, this note will serve as a discharge from care along with most recent update on progress.

## 2021-07-14 ENCOUNTER — HOSPITAL ENCOUNTER (OUTPATIENT)
Dept: PHYSICAL THERAPY | Age: 36
Setting detail: THERAPIES SERIES
Discharge: HOME | End: 2021-07-14
Payer: COMMERCIAL

## 2021-07-14 PROCEDURE — 97140 MANUAL THERAPY 1/> REGIONS: CPT | Performed by: PHYSICAL THERAPIST

## 2021-07-14 PROCEDURE — 97110 THERAPEUTIC EXERCISES: CPT | Performed by: PHYSICAL THERAPIST

## 2021-07-14 NOTE — FLOWSHEET NOTE
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), left knee patellofemoral arthritis (M17.12)  · Treatment Diagnosis: left knee pain (M25.562), left knee instability (Q05.271)  Insurance/Certification information:  PT Insurance Information: [employer]  Physician Information:  Referring Practitioner: Jinx Bosworth  Has the plan of care been signed (Y/N):        [x]  Yes  []  No     Date of Patient follow up with Physician:  2021      Is this a Progress Report:     []  Yes  [x]  No        If Yes:  Date Range for reporting period:  Beginnin2021  Ending    Progress report will be due (10 Rx or 30 days whichever is less): 2510       Recertification will be due (POC Duration  / 90 days whichever is less):       Visit # Insurance Allowable Auth Required   In-person 2  [x]  Yes []  No    Telehealth   []  Yes []  No    Total          Functional Scale:  LEFS 52/80 (35%)    Date assessed:  2021      Therapy Diagnosis/Practice Pattern: D      Number of Comorbidities:  [x]0     []1-2    []3+    Latex Allergy:  [x]NO      []YES  Preferred Language for Healthcare:   [x]English       []other:      Pain level:  0-7/10     SUBJECTIVE:  Patient reports he has been doing the exercises since last visit. Most challenging is the leg lifts on his side. Knee does not feel much different at this time. Pain up and down.     OBJECTIVE:    Observation:    Test measurements: NT this date      RESTRICTIONS/PRECAUTIONS:     Exercises/Interventions:     Therapeutic Ex (52962) Sets/sec Reps Notes/CUES   HS stretch tableside 30\" 3x    Incline stretch 30\" 3x    Prone quad stretch 30\" 3x    To perform at home      SLR abd 2 15                Minisquats 3\" 2 x 10 Min pain initially; GTB added second set   LBW GVL  3 laps 15'    Monster walks GVL 3 laps 15'    Foam roll ITband/quad 2'     Patient ed   HEP Progression, foam roll to help reduce tightness and PF compression; hip activation and PF pain         Manual Intervention (91760)      IASTM to quad and IT band fanning and scanning 8'                                   NMR re-education (64846)   CUES NEEDED   SLB on airex 15\" 5x    LSD 3\" 10x    Glider posterior reach 3\" 10x  short range                                       Therapeutic Activity (57899)                                          HEP instruction:   Access Code: [email or web address]  URL: [email or web address]  Date: 06/30/2021  Prepared by: Ghazala Oconnell      Therapeutic Exercise and NMR EXR  [x] (13798) Provided verbal/tactile cueing for activities related to strengthening, flexibility, endurance, ROM for improvements in LE, proximal hip, and core control with self care, mobility, lifting, ambulation.  [] (41319) Provided verbal/tactile cueing for activities related to improving balance, coordination, kinesthetic sense, posture, motor skill, proprioception  to assist with LE, proximal hip, and core control in self care, mobility, lifting, ambulation and eccentric single leg control.      NMR and Therapeutic Activities:    [x] (50811 or 62418) Provided verbal/tactile cueing for activities related to improving balance, coordination, kinesthetic sense, posture, motor skill, proprioception and motor activation to allow for proper function of core, proximal hip and LE with self care and ADLs  [] (88083) Gait Re-education- Provided training and instruction to the patient for proper LE, core and proximal hip recruitment and positioning and eccentric body weight control with ambulation re-education including up and down stairs     Home Exercise Program:    [x] (25843) Reviewed/Progressed HEP activities related to strengthening, flexibility, endurance, ROM of core, proximal hip and LE for functional self-care, mobility, lifting and ambulation/stair navigation   [] (44174)Reviewed/Progressed HEP activities related to improving balance, coordination, kinesthetic sense, posture, motor skill, proprioception of core, proximal hip and LE for self care, mobility, lifting, and ambulation/stair navigation      Manual Treatments:  PROM / STM / Oscillations-Mobs:  G-I, II, III, IV (PA's, Inf., Post.)  [x] (57547) Provided manual therapy to mobilize LE, proximal hip and/or LS spine soft tissue/joints for the purpose of modulating pain, promoting relaxation,  increasing ROM, reducing/eliminating soft tissue swelling/inflammation/restriction, improving soft tissue extensibility and allowing for proper ROM for normal function with self care, mobility, lifting and ambulation. Modalities:  None this date   [] GAME READY (VASO)- for significant edema, swelling, pain control. Charges  Timed Code Treatment Minutes: 40'   Total Treatment Minutes: 36'     [] EVAL (LOW) 455 1011   [] EVAL (MOD) 36511  [] EVAL (HIGH) 17745   [] RE-EVAL     [x] QJ(32958) x  2  [] IONTO  [] NMR (54018) x     [] VASO  [x] Manual (83690) x 1     [] Other:  [] TA x      [] Mech Traction (52359)  [] ES(attended) (28062)      [] ES (un) (41082):     GOALS:  Patient stated goal: \"To be able to climb stairs without pain. To get back to running/basketball. \"  [] Progressing: [] Met: [] Not Met: [] Adjusted    Therapist goals for Patient:   Short Term Goals: To be achieved in: 2 weeks  1. Independent in HEP and progression per patient tolerance, in order to prevent re-injury. [] Progressing: [] Met: [] Not Met: [] Adjusted  2. Patient will have a decrease in pain to facilitate improvement in movement, function, and ADLs as indicated by Functional Deficits. [] Progressing: [] Met: [] Not Met: [] Adjusted    Long Term Goals:  To be achieved in: 8 weeks  1. Disability index score of 16% or less for the LEFS to assist with reaching prior level of function. [] Progressing: [] Met: [] Not Met: [] Adjusted  2. Patient will demonstrate increased AROM of left knee flexion to 130 degrees to allow for proper joint functioning as indicated by patients Functional Deficits. [] Progressing: [] Met: [] Not Met: [] Adjusted  3. Patient will demonstrate an increase in Strength in left hip flexion, ER, and abduction to 4+/5 and knee flexion and extension to 5/5 to allow for proper functional mobility as indicated by patients Functional Deficits. [] Progressing: [] Met: [] Not Met: [] Adjusted  4. Patient will be able to ascend/descend stairs reciprocally without increased symptoms or restriction. [] Progressing: [] Met: [] Not Met: [] Adjusted  5. Patient will be able to run one mile without increased symptoms or restriction or progress to [facility] as appropriate for return to higher level activities. (patient specific functional goal)    [] Progressing: [] Met: [] Not Met: [] Adjusted     Progression Towards Functional goals:  [] Patient is progressing as expected towards functional goals listed. [] Progression is slowed due to complexities listed. [] Progression has been slowed due to co-morbidities. [x] Plan just implemented, too soon to assess goals progression  [] Other:     Overall Progression Towards Functional goals/ Treatment Progress Update:  [] Patient is progressing as expected towards functional goals listed. [] Progression is slowed due to complexities/Impairments listed. [] Progression has been slowed due to co-morbidities.   [x] Plan just implemented, too soon to assess goals progression <30days   [] Goals require adjustment due to lack of progress  [] Patient is not progressing as expected and requires additional follow up with physician  [] Other    Prognosis for POC: [x] Good [] Fair  [] Poor      Patient requires continued skilled intervention: [x] Yes  [] No    Treatment/Activity Tolerance:  [x] Patient able to complete treatment  [] Patient limited by fatigue  [] Patient limited by pain    [] Patient limited by other medical complications  [] Other:     ASSESSMENT: Patient with some PF pain with functional quad exercises. Patient initially with some pain with minisquats, but dissipated with addition of TB for abduction. Unable to perform more than one set of LSD or posterior reach due to pain. Discussed with patient that it will take time to build strength in his quad and hip in order to reduce pain with higher level activities. Return to Play: (if applicable)   [x]  Stage 1: Intro to Strength   []  Stage 2: Return to Run and Strength   []  Stage 3: Return to Jump and Strength   []  Stage 4: Dynamic Strength and Agility   []  Stage 5: Sport Specific Training     []  Ready to Return to Play, Meets All Above Stages   []  Not Ready for Return to Sports   Comments:                          PLAN: See eval  [x] Continue per plan of care [] Alter current plan (see comments above)  [] Plan of care initiated [] Hold pending MD visit [] Discharge      Electronically signed by:  Catrachita Aguilar PT, DPT [id number]     Note: If patient does not return for scheduled/ recommended follow up visits, this note will serve as a discharge from care along with most recent update on progress.

## 2021-07-26 ENCOUNTER — HOSPITAL ENCOUNTER (OUTPATIENT)
Dept: PHYSICAL THERAPY | Age: 36
Setting detail: THERAPIES SERIES
Discharge: HOME | End: 2021-07-26
Payer: COMMERCIAL

## 2021-07-26 PROCEDURE — 97112 NEUROMUSCULAR REEDUCATION: CPT | Performed by: PHYSICAL THERAPIST

## 2021-07-26 PROCEDURE — 97110 THERAPEUTIC EXERCISES: CPT | Performed by: PHYSICAL THERAPIST

## 2021-07-26 PROCEDURE — 97140 MANUAL THERAPY 1/> REGIONS: CPT | Performed by: PHYSICAL THERAPIST

## 2021-07-26 NOTE — PROGRESS NOTES
The [address] [facility] and [address] [facility], [address] [facility], 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), left knee patellofemoral arthritis (M17.12)  · Treatment Diagnosis: left knee pain (M25.562), left knee instability (C76.625)  Insurance/Certification information:  PT Insurance Information: [employer]  Physician Information:  Referring Practitioner: Eliud Barragan  Has the plan of care been signed (Y/N):        [x]  Yes  []  No     Date of Patient follow up with Physician:  2021      Is this a Progress Report:     [x]  Yes  []  No        If Yes:  Date Range for reporting period:  Beginnin2021  Endin2021    Progress report will be due (10 Rx or 30 days whichever is less):        Recertification will be due (POC Duration  / 90 days whichever is less):       Visit # Insurance Allowable Auth Required   In-person 3  [x]  Yes []  No    Telehealth   []  Yes []  No    Total          Functional Scale:  LEFS 52/80 (35%)    Date assessed:  2021      Therapy Diagnosis/Practice Pattern: D      Number of Comorbidities:  [x]0     []1-2    []3+    Latex Allergy:  [x]NO      []YES  Preferred Language for Healthcare:   [x]English       []other:      Pain level:  0-2/10     SUBJECTIVE:  Patient reports he has been doing the exercises since last visit. Most challenging is the leg lifts on his side. Knee does not feel much different at this time. Pain up and down.     OBJECTIVE:    Observation:    Test measurements: AROM left knee flexion 130 deg, MMT: left hip flexion 4+/5, knee extension and flexion 4/5, hip abduction and ER 4/5    RESTRICTIONS/PRECAUTIONS:     Exercises/Interventions:     Therapeutic Ex (89585) Sets/sec Reps Notes/CUES   HS stretch tableside 30\" 3x    Incline stretch 30\" 3x    Prone quad stretch 30\" 3x    To perform at home               SB bridging GTB around thighs 5\" 20x    Minisquats 3\" 2 x 10    LBW GVL  3 laps 15'    Monster walks GVL 3 laps 15'        Patient ed   Progressing quad strength   Leg Press B 120#  Leg Press ecc 90# 2 10 ea    Manual Intervention ([id number])      IASTM to quad, IT band, and HS fanning and scanning 10'                                   NMR re-education (71286)   CUES NEEDED   SLB on airex with plyoback forward  SLB on airex with plyoback lateral   2  1 10  10    LSD 2\"  LSD 4\" 1   10x  5x Pain with 4\"    short range   Steamboats 1 20x ea                                  Therapeutic Activity (35949)                                          HEP instruction:   Access Code: [email or web address]  URL: [email or web address]  Date: 06/30/2021  Prepared by: Shawn Jackson      Therapeutic Exercise and NMR EXR  [x] (40446) Provided verbal/tactile cueing for activities related to strengthening, flexibility, endurance, ROM for improvements in LE, proximal hip, and core control with self care, mobility, lifting, ambulation.  [] (43918) Provided verbal/tactile cueing for activities related to improving balance, coordination, kinesthetic sense, posture, motor skill, proprioception  to assist with LE, proximal hip, and core control in self care, mobility, lifting, ambulation and eccentric single leg control.      NMR and Therapeutic Activities:    [x] (11382 or 89687) Provided verbal/tactile cueing for activities related to improving balance, coordination, kinesthetic sense, posture, motor skill, proprioception and motor activation to allow for proper function of core, proximal hip and LE with self care and ADLs  [] (45444) Gait Re-education- Provided training and instruction to the patient for proper LE, core and proximal hip recruitment and positioning and eccentric body weight control with ambulation re-education including up and down stairs     Home Exercise Program:    [x] (68659) Reviewed/Progressed HEP activities related to strengthening, flexibility, endurance, ROM of core, proximal hip and LE for functional self-care, mobility, lifting and ambulation/stair navigation   [] (12166)Reviewed/Progressed HEP activities related to improving balance, coordination, kinesthetic sense, posture, motor skill, proprioception of core, proximal hip and LE for self care, mobility, lifting, and ambulation/stair navigation      Manual Treatments:  PROM / STM / Oscillations-Mobs:  G-I, II, III, IV (PA's, Inf., Post.)  [x] (25757) Provided manual therapy to mobilize LE, proximal hip and/or LS spine soft tissue/joints for the purpose of modulating pain, promoting relaxation,  increasing ROM, reducing/eliminating soft tissue swelling/inflammation/restriction, improving soft tissue extensibility and allowing for proper ROM for normal function with self care, mobility, lifting and ambulation. Modalities:  None this date   [] GAME READY (VASO)- for significant edema, swelling, pain control. Charges  Timed Code Treatment Minutes: 40'   Total Treatment Minutes: 36'     [] EVAL (LOW) 455 1011   [] EVAL (MOD) 99667  [] EVAL (HIGH) 09706   [] RE-EVAL     [x] RZ(86972) x  1  [] IONTO  [x] NMR (81737) x 1    [] VASO  [x] Manual (64037) x 1     [] Other:  [] TA x      [] Mech Traction (80189)  [] ES(attended) (49181)      [] ES (un) (67821):     GOALS:  Patient stated goal: \"To be able to climb stairs without pain. To get back to running/basketball. \"  [] Progressing: [] Met: [] Not Met: [] Adjusted    Therapist goals for Patient:   Short Term Goals: To be achieved in: 2 weeks  1. Independent in HEP and progression per patient tolerance, in order to prevent re-injury. [] Progressing: [x] Met: [] Not Met: [] Adjusted  2.  Patient will have a decrease in pain to facilitate improvement in movement, function, and ADLs as with physician  [] Other    Prognosis for POC: [x] Good [] Fair  [] Poor      Patient requires continued skilled intervention: [x] Yes  [] No    Treatment/Activity Tolerance:  [x] Patient able to complete treatment  [] Patient limited by fatigue  [] Patient limited by pain    [] Patient limited by other medical complications  [] Other:     ASSESSMENT: Patient able to perform squats without pain this date. Still with pain and crepitus with single leg functional quad exercises. Demonstrates improvement in hip strength since initial visit, but still with weakness noted in both quads and hamstrings. Discussed the relationship between quad/hamstring strength and knee stability. Patient would benefit from continued skilled PT to address limitations. Return to Play: (if applicable)   [x]  Stage 1: Intro to Strength   []  Stage 2: Return to Run and Strength   []  Stage 3: Return to Jump and Strength   []  Stage 4: Dynamic Strength and Agility   []  Stage 5: Sport Specific Training     []  Ready to Return to Play, Meets All Above Stages   []  Not Ready for Return to Sports   Comments:                          PLAN: See eval  [x] Continue per plan of care [] Alter current plan (see comments above)  [] Plan of care initiated [] Hold pending MD visit [] Discharge      Electronically signed by:  Nikita Espino PT, DPT [id number]     Note: If patient does not return for scheduled/ recommended follow up visits, this note will serve as a discharge from care along with most recent update on progress.

## 2021-07-28 ENCOUNTER — OFFICE VISIT (OUTPATIENT)
Dept: ORTHOPEDIC SURGERY | Age: 36
End: 2021-07-28
Payer: COMMERCIAL

## 2021-07-28 VITALS — TEMPERATURE: 98.7 F | BODY MASS INDEX: 27.59 KG/M2 | WEIGHT: 215 LBS | HEIGHT: 74 IN

## 2021-07-28 DIAGNOSIS — M25.362: ICD-10-CM

## 2021-07-28 DIAGNOSIS — M17.12 OSTEOARTHRITIS OF LEFT PATELLOFEMORAL JOINT: Primary | ICD-10-CM

## 2021-07-28 PROCEDURE — 99214 OFFICE O/P EST MOD 30 MIN: CPT | Performed by: ORTHOPAEDIC SURGERY

## 2021-07-28 NOTE — PROGRESS NOTES
Chief Complaint  Follow-up (left knee )      History of Present Illness:  Walt Mcintosh is a pleasant 37 y.o. male here for follow up regarding his left knee. As a reminder he underwent an ACL reconstruction using hamstring autograft by Dr. Rajinder Paz. He did admit to an injury where we felt a pop but was evaluated and told he was okay. Since then he has continued to have feelings of instability, loss of motion and pain. He was last seen about a month ago, and his symptoms and exam are indicative of patellofemoral osteoarthritis with some rotary instability. He was placed in formal supervised physical therapy. Since then states that he continues to improve still having some pain but improved week by week. He feels like he is gotten much stronger. Medical History:  Patient's medications, allergies, past medical, surgical, social and family histories were reviewed and updated as appropriate. Pain Assessment  Location of Pain: Knee  Location Modifiers: Left  Severity of Pain: 4  Quality of Pain: Sharp, Aching  Duration of Pain: A few minutes  Frequency of Pain: Intermittent  Aggravating Factors: Stairs (running)  Limiting Behavior: Yes  Relieving Factors: Rest  Result of Injury: Yes  Work-Related Injury: No  Are there other pain locations you wish to document?: No  ROS: Review of systems reviewed from Patient History Form completed today and available in the patient's chart under the Media tab. Pertinent items are noted in HPI  Review of systems reviewed from Patient History Form completed today and available in the patient's chart under the Media tab. Vital Signs:  Temp 98.7 °F (37.1 °C)   Ht 6' 2\" (1.88 m)   Wt 215 lb (97.5 kg)   BMI 27.60 kg/m²       Left knee examination:    Gait: No use of assistive devices. No antalgic gait. Alignment: normal alignment. Inspection/skin: Skin is intact without erythema or ecchymosis. No gross deformity. Palpation: marked crepitus.  no joint line tenderness present. Range of Motion: There is full range of motion. Strength: Quadriceps weakness    Effusion: No effusion or swelling present. Ligamentous stability: No cruciate or collateral ligament instability. Neurologic and vascular: Skin is warm and well-perfused. Sensation is intact to light-touch. Special tests: Negative Eh sign. Grade 1A pivot      Right knee examination:    Gait: No use of assistive devices. No antalgic gait. Alignment: normal alignment. Inspection/skin: Skin is intact without erythema or ecchymosis. No gross deformity. Palpation: mild crepitus. no joint line tenderness present. Range of Motion: There is full range of motion. Strength: Normal quadriceps development. Effusion: No effusion or swelling present. Ligamentous stability: No cruciate or collateral ligament instability. Neurologic and vascular: Skin is warm and well-perfused. Sensation is intact to light-touch. Special tests: Negative Eh sign. Small glide with pivot      Radiology:       Pertinent imaging was interpreted and reviewed with the patient today, both images and report. MRI of the left knee dated 6/21/2021 was interpreted and reviewed with the patient today. CONCLUSION:   1. Intermediate-grade-to-high-grade chondral irregularity involves the central and medial    patellar cartilage. Edema changes deep to this either reflect contusion or stress change with    the former favored. 2. Intact ACL graft. 3. No meniscal or collateral ligament tear. 4. Chronic MCL sprain, scarring and capsulitis.          Assessment :  38 yo male s/p ACL reconstruction with residual rotatory instability of left knee, patellofemoral OA left knee. Impression:  Encounter Diagnoses   Name Primary?     Osteoarthritis of left patellofemoral joint Yes    Anteromedial rotatory instability of knee, left        Office Procedures:  No orders of the defined types were placed in this encounter. Plan: Pertinent imaging was reviewed. The etiology, natural history, and treatment options for the disorder were discussed. The roles of activity medication, antiinflammatories, injections, bracing, physical therapy, and surgical interventions were all described to the patient and questions were answered. Patient has an intact graft with some rotatory instability. His primary issue is his chondral irregularity of the patellar cartilage. He has responded well to formal supervised physical therapy and continues to improve. I would like him to continue working with PT. If he hits a plateau or experiences a regression he would be a candidate for a left knee arthroscopy with patellar chondroplasty. Follow up in one month or sooner if worsening symptoms   Geri Huynh is in agreement with this plan. All questions were answered to patient's satisfaction and was encouraged to call with any further questions. Total time spent for evaluation, education, and development of treatment plan: 38 minutes    Aletha Yin [facility]  7/28/2021    During this exam, I, Aletha Yin PA-C, functioned as a scribe for Dr. Elif Telles. The history taking and physical examination were performed by Dr. Elif Telles. All counseling during the appointment was performed between the patient and Dr. Elif Telles. 7/28/2021 12:45 PM    This dictation was performed with a verbal recognition program (DRAGON) and it was checked for errors. It is possible that there are still dictated errors within this office note. If so, please bring any areas to my attention for an addendum. All efforts were made to ensure that this office note is accurate. I attest that I met personally with the patient, performed the described exam, reviewed the radiographic studies and medical records associated with this patient and supervised the services that are described above.      Yolanda Tobin MD

## 2021-08-16 ENCOUNTER — HOSPITAL ENCOUNTER (OUTPATIENT)
Dept: PHYSICAL THERAPY | Age: 36
Setting detail: THERAPIES SERIES
Discharge: HOME | End: 2021-08-16
Payer: COMMERCIAL

## 2021-08-16 PROCEDURE — 97140 MANUAL THERAPY 1/> REGIONS: CPT | Performed by: PHYSICAL THERAPIST

## 2021-08-16 PROCEDURE — 97110 THERAPEUTIC EXERCISES: CPT | Performed by: PHYSICAL THERAPIST

## 2021-08-16 NOTE — FLOWSHEET NOTE
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), left knee patellofemoral arthritis (M17.12)  · Treatment Diagnosis: left knee pain (M25.562), left knee instability (W51.505)  Insurance/Certification information:  PT Insurance Information: [employer]  Physician Information:  Referring Practitioner: Aryan Nguyen  Has the plan of care been signed (Y/N):        [x]  Yes  []  No     Date of Patient follow up with Physician:  2021      Is this a Progress Report:     [x]  Yes  []  No        If Yes:  Date Range for reporting period:  Beginnin2021  Ending:     Progress report will be due (10 Rx or 30 days whichever is less):        Recertification will be due (POC Duration  / 90 days whichever is less):       Visit # Insurance Allowable Auth Required   In-person 4  [x]  Yes []  No    Telehealth   []  Yes []  No    Total          Functional Scale:  LEFS 52/80 (35%)    Date assessed:  2021      Therapy Diagnosis/Practice Pattern: D      Number of Comorbidities:  [x]0     []1-2    []3+    Latex Allergy:  [x]NO      []YES  Preferred Language for Healthcare:   [x]English       []other:      Pain level:  0-6/10     SUBJECTIVE:  Patient reports he still feels pain in his knee, mostly with stairs. Pain is enough that it makes him adjust his movement pattern. MD said to continue another month with PT exercises and then return for follow up. Reports he has been good about getting them in consistently.     OBJECTIVE:    Observation:    Test measurements: NT this date    RESTRICTIONS/PRECAUTIONS:     Exercises/Interventions:     Therapeutic Ex (03062) Sets/sec Reps Notes/CUES   HS stretch tableside 30\" 3x    Incline stretch 30\" 3x    Prone quad stretch 30\" 3x    To perform at home      Sidelying abduction matrix 1# 1 10x ea          SB bridging with [address] 1 15    SB bridging ecc 1 10    Minisquats 3\" 2 x 10              Patient ed   Progressing quad strength   Leg Press B 120#  Leg Press ecc 100# 2 10 ea    Manual Intervention (17786)      IASTM to quad, IT band, and HS fanning and scanning 10'                                   NMR re-education (03684)   CUES NEEDED      LSD 2\"   3 10x     short range   Steamboats 2 20x ea                                  Therapeutic Activity (85567)                                          HEP instruction:   Access Code: [email or web address]  URL: [email or web address]  Date: 06/30/2021  Prepared by: Heather Sotomayor      Therapeutic Exercise and NMR EXR  [x] (91441) Provided verbal/tactile cueing for activities related to strengthening, flexibility, endurance, ROM for improvements in LE, proximal hip, and core control with self care, mobility, lifting, ambulation.  [] (11038) Provided verbal/tactile cueing for activities related to improving balance, coordination, kinesthetic sense, posture, motor skill, proprioception  to assist with LE, proximal hip, and core control in self care, mobility, lifting, ambulation and eccentric single leg control.      NMR and Therapeutic Activities:    [x] (38793 or 45727) Provided verbal/tactile cueing for activities related to improving balance, coordination, kinesthetic sense, posture, motor skill, proprioception and motor activation to allow for proper function of core, proximal hip and LE with self care and ADLs  [] (37030) Gait Re-education- Provided training and instruction to the patient for proper LE, core and proximal hip recruitment and positioning and eccentric body weight control with ambulation re-education including up and down stairs     Home Exercise Program:    [x] (90904) Reviewed/Progressed HEP activities related to strengthening, flexibility, endurance, ROM of core, proximal hip and LE for functional self-care, mobility, lifting and ambulation/stair navigation   [] (79355)Reviewed/Progressed HEP activities related to improving balance, coordination, kinesthetic sense, posture, motor skill, proprioception of core, proximal hip and LE for self care, mobility, lifting, and ambulation/stair navigation      Manual Treatments:  PROM / STM / Oscillations-Mobs:  G-I, II, III, IV (PA's, Inf., Post.)  [x] (16082) Provided manual therapy to mobilize LE, proximal hip and/or LS spine soft tissue/joints for the purpose of modulating pain, promoting relaxation,  increasing ROM, reducing/eliminating soft tissue swelling/inflammation/restriction, improving soft tissue extensibility and allowing for proper ROM for normal function with self care, mobility, lifting and ambulation. Modalities:  None this date   [] GAME READY (VASO)- for significant edema, swelling, pain control. Charges  Timed Code Treatment Minutes: 40'   Total Treatment Minutes: 36'     [] EVAL (LOW) 455 1011   [] EVAL (MOD) 43388  [] EVAL (HIGH) 37804   [] RE-EVAL     [x] UN(80780) x  2  [] IONTO  [] NMR (08109) x     [] VASO  [x] Manual (20632) x 1     [] Other:  [] TA x      [] Mech Traction (17425)  [] ES(attended) (94376)      [] ES (un) (75143):     GOALS:  Patient stated goal: \"To be able to climb stairs without pain. To get back to running/basketball. \"  [] Progressing: [] Met: [] Not Met: [] Adjusted    Therapist goals for Patient:   Short Term Goals: To be achieved in: 2 weeks  1. Independent in HEP and progression per patient tolerance, in order to prevent re-injury. [] Progressing: [x] Met: [] Not Met: [] Adjusted  2. Patient will have a decrease in pain to facilitate improvement in movement, function, and ADLs as indicated by Functional Deficits. [x] Progressing: [] Met: [] Not Met: [] Adjusted    Long Term Goals:  To be achieved in: 8 weeks  1. Disability index score of 16% or less for the LEFS to assist with reaching prior level of function. [] Progressing: [] Met: [] Not Met: [] Adjusted  2. Patient will demonstrate increased AROM of left knee flexion to 130 degrees to allow for proper joint functioning as indicated by patients Functional Deficits. [] Progressing: [x] Met: [] Not Met: [] Adjusted  3. Patient will demonstrate an increase in Strength in left hip flexion, ER, and abduction to 4+/5 and knee flexion and extension to 5/5 to allow for proper functional mobility as indicated by patients Functional Deficits. [x] Progressing: [] Met: [] Not Met: [] Adjusted  4. Patient will be able to ascend/descend stairs reciprocally without increased symptoms or restriction. [x] Progressing: [] Met: [] Not Met: [] Adjusted  5. Patient will be able to run one mile without increased symptoms or restriction or progress to [facility] as appropriate for return to higher level activities. (patient specific functional goal)    [x] Progressing: [] Met: [] Not Met: [] Adjusted     Progression Towards Functional goals:  [] Patient is progressing as expected towards functional goals listed. [] Progression is slowed due to complexities listed. [] Progression has been slowed due to co-morbidities. [x] Plan just implemented, too soon to assess goals progression  [] Other:     Overall Progression Towards Functional goals/ Treatment Progress Update:  [] Patient is progressing as expected towards functional goals listed. [] Progression is slowed due to complexities/Impairments listed. [] Progression has been slowed due to co-morbidities.   [x] Plan just implemented, too soon to assess goals progression <30days   [] Goals require adjustment due to lack of progress  [] Patient is not progressing as expected and requires additional follow up with physician  [] Other    Prognosis for POC: [x] Good [] Fair  [] Poor      Patient requires continued skilled intervention: [x] Yes  [] No    Treatment/Activity Tolerance:  [x] Patient able to complete treatment  [] Patient limited by fatigue  [] Patient limited by pain    [] Patient limited by other medical complications  [] Other:     ASSESSMENT: Patient challenged by new hip exercises. Still with pain with functional quad strength, but can perform LSD on 2\" box this date. Discussed progressing slowly from this at home by gradually increasing distance. Patient agreeable. Return to Play: (if applicable)   [x]  Stage 1: Intro to Strength   []  Stage 2: Return to Run and Strength   []  Stage 3: Return to Jump and Strength   []  Stage 4: Dynamic Strength and Agility   []  Stage 5: Sport Specific Training     []  Ready to Return to Play, Meets All Above Stages   []  Not Ready for Return to Sports   Comments:                          PLAN: See eval  [x] Continue per plan of care [] Alter current plan (see comments above)  [] Plan of care initiated [] Hold pending MD visit [] Discharge      Electronically signed by:  Sonam Rubio, PT, DPT [id number]     Note: If patient does not return for scheduled/ recommended follow up visits, this note will serve as a discharge from care along with most recent update on progress.

## 2021-08-30 ENCOUNTER — HOSPITAL ENCOUNTER (OUTPATIENT)
Dept: PHYSICAL THERAPY | Age: 36
Setting detail: THERAPIES SERIES
Discharge: HOME | End: 2021-08-30
Payer: COMMERCIAL

## 2021-08-30 PROCEDURE — 97112 NEUROMUSCULAR REEDUCATION: CPT | Performed by: PHYSICAL THERAPIST

## 2021-08-30 PROCEDURE — 97110 THERAPEUTIC EXERCISES: CPT | Performed by: PHYSICAL THERAPIST

## 2021-08-30 PROCEDURE — 97140 MANUAL THERAPY 1/> REGIONS: CPT | Performed by: PHYSICAL THERAPIST

## 2021-08-30 NOTE — FLOWSHEET NOTE
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)  · Treatment Diagnosis: left knee pain (M25.562), left knee instability (W91.124)  Insurance/Certification information:  PT Insurance Information: [employer]  Physician Information:  Referring Practitioner: Eliud Barragan  Has the plan of care been signed (Y/N):        [x]  Yes  []  No     Date of Patient follow up with Physician:  2021      Is this a Progress Report:     [x]  Yes  []  No        If Yes:  Date Range for reporting period:  Beginnin2021  Ending:     Progress report will be due (10 Rx or 30 days whichever is less):        Recertification will be due (POC Duration  / 90 days whichever is less):       Visit # Insurance Allowable Auth Required   In-person 5  [x]  Yes []  No    Telehealth   []  Yes []  No    Total          Functional Scale:  LEFS 57/80 (29%)    Date assessed:  2021      Therapy Diagnosis/Practice Pattern: D      Number of Comorbidities:  [x]0     []1-2    []3+    Latex Allergy:  [x]NO      []YES  Preferred Language for Healthcare:   [x]English       []other:      Pain level:  0-6/10     SUBJECTIVE:  Patient reports he still feels pain in his knee, mostly with stairs. Pain is enough that it makes him adjust his movement pattern. MD said to continue another month with PT exercises and then return for follow up. Reports he has been good about getting them inconsistently.     OBJECTIVE:    Observation:    Test measurements: AROM left knee flexion 130 deg, MMT: left hip flexion 4+/5, knee extension 4/5 flexion 4+/5, hip abduction 4/5, ER 4+/5     RESTRICTIONS/PRECAUTIONS:     Exercises/Interventions:     Therapeutic Ex (13851) Sets/sec Reps Notes/CUES   HS stretch tableside 30\" 3x    Incline stretch 30\" 3x    Prone quad stretch 30\" 3x    To perform at home               SB bridging with [address] 1 15    SB bridging ecc 1 10                 Patient ed   Progressing quad strength   Leg Press B 120#  Leg Press ecc 100# 2 15 ea    Manual Intervention (54984)      IASTM to quad, IT band, and HS fanning and scanning 10'                                   NMR re-education (18949)   CUES NEEDED   SLB on airex with plyoback forward  SLB on airex with plyoback lateral   2  1 10  10    LSD 2\"   3 10x    Glider posterior reach 3\" 2 x 10 ea  short range   Steamboats 2 20x ea                                  Therapeutic Activity (20664)                                          HEP instruction:   Access Code: [email or web address]  URL: [email or web address]  Date: 06/30/2021  Prepared by: Jinny Leon      Therapeutic Exercise and NMR EXR  [x] (23661) Provided verbal/tactile cueing for activities related to strengthening, flexibility, endurance, ROM for improvements in LE, proximal hip, and core control with self care, mobility, lifting, ambulation.  [] (56130) Provided verbal/tactile cueing for activities related to improving balance, coordination, kinesthetic sense, posture, motor skill, proprioception  to assist with LE, proximal hip, and core control in self care, mobility, lifting, ambulation and eccentric single leg control.      NMR and Therapeutic Activities:    [x] (48978 or 81422) Provided verbal/tactile cueing for activities related to improving balance, coordination, kinesthetic sense, posture, motor skill, proprioception and motor activation to allow for proper function of core, proximal hip and LE with self care and ADLs  [] (62757) Gait Re-education- Provided training and instruction to the patient for proper LE, core and proximal hip recruitment and positioning and eccentric body weight control with ambulation re-education including up and down stairs     Home Exercise Program:    [x] (27215) Reviewed/Progressed HEP activities related to strengthening, flexibility, endurance, ROM of core, proximal hip and LE for functional self-care, mobility, lifting and ambulation/stair navigation   [] (54699)Reviewed/Progressed HEP activities related to improving balance, coordination, kinesthetic sense, posture, motor skill, proprioception of core, proximal hip and LE for self care, mobility, lifting, and ambulation/stair navigation      Manual Treatments:  PROM / STM / Oscillations-Mobs:  G-I, II, III, IV (PA's, Inf., Post.)  [x] (01054) Provided manual therapy to mobilize LE, proximal hip and/or LS spine soft tissue/joints for the purpose of modulating pain, promoting relaxation,  increasing ROM, reducing/eliminating soft tissue swelling/inflammation/restriction, improving soft tissue extensibility and allowing for proper ROM for normal function with self care, mobility, lifting and ambulation. Modalities:  None this date   [] GAME READY (VASO)- for significant edema, swelling, pain control. Charges  Timed Code Treatment Minutes: 40'   Total Treatment Minutes: 36'     [] EVAL (LOW) 455 1011   [] EVAL (MOD) 51422  [] EVAL (HIGH) 69113   [] RE-EVAL     [x] KJ(44999) x  2  [] IONTO  [] NMR (40132) x     [] VASO  [x] Manual (92571) x 1     [] Other:  [] TA x      [] Mech Traction (78546)  [] ES(attended) (80257)      [] ES (un) (31561):     GOALS:  Patient stated goal: \"To be able to climb stairs without pain. To get back to running/basketball. \"  [] Progressing: [] Met: [] Not Met: [] Adjusted    Therapist goals for Patient:   Short Term Goals: To be achieved in: 2 weeks  1. Independent in HEP and progression per patient tolerance, in order to prevent re-injury. [] Progressing: [x] Met: [] Not Met: [] Adjusted  2.  Patient will have a decrease in pain to facilitate improvement in movement, function, and ADLs as indicated by Functional Deficits. [x] Progressing: [] Met: [] Not Met: [] Adjusted    Long Term Goals: To be achieved in: 8 weeks  1. Disability index score of 16% or less for the LEFS to assist with reaching prior level of function. [x] Progressing: [] Met: [] Not Met: [] Adjusted  2. Patient will demonstrate increased AROM of left knee flexion to 130 degrees to allow for proper joint functioning as indicated by patients Functional Deficits. [] Progressing: [x] Met: [] Not Met: [] Adjusted  3. Patient will demonstrate an increase in Strength in left hip flexion, ER, and abduction to 4+/5 and knee flexion and extension to 5/5 to allow for proper functional mobility as indicated by patients Functional Deficits. [x] Progressing: [] Met: [] Not Met: [] Adjusted  4. Patient will be able to ascend/descend stairs reciprocally without increased symptoms or restriction. [x] Progressing: [] Met: [] Not Met: [] Adjusted  5. Patient will be able to run one mile without increased symptoms or restriction or progress to [employer] as appropriate for return to higher level activities. (patient specific functional goal)    [x] Progressing: [] Met: [] Not Met: [] Adjusted     Progression Towards Functional goals:  [] Patient is progressing as expected towards functional goals listed. [] Progression is slowed due to complexities listed. [] Progression has been slowed due to co-morbidities. [x] Plan just implemented, too soon to assess goals progression  [] Other:     Overall Progression Towards Functional goals/ Treatment Progress Update:  [] Patient is progressing as expected towards functional goals listed. [] Progression is slowed due to complexities/Impairments listed. [] Progression has been slowed due to co-morbidities.   [x] Plan just implemented, too soon to assess goals progression <30days   [] Goals require adjustment due to lack of progress  [] Patient is not

## 2021-09-07 ENCOUNTER — OFFICE VISIT (OUTPATIENT)
Dept: ORTHOPEDIC SURGERY | Age: 36
End: 2021-09-07
Payer: COMMERCIAL

## 2021-09-07 VITALS — TEMPERATURE: 98 F | BODY MASS INDEX: 27.59 KG/M2 | HEIGHT: 74 IN | WEIGHT: 215 LBS

## 2021-09-07 DIAGNOSIS — M25.362: ICD-10-CM

## 2021-09-07 DIAGNOSIS — M17.12 OSTEOARTHRITIS OF LEFT PATELLOFEMORAL JOINT: Primary | ICD-10-CM

## 2021-09-07 PROCEDURE — 99214 OFFICE O/P EST MOD 30 MIN: CPT | Performed by: ORTHOPAEDIC SURGERY

## 2021-09-07 PROCEDURE — 20610 DRAIN/INJ JOINT/BURSA W/O US: CPT | Performed by: ORTHOPAEDIC SURGERY

## 2021-09-07 NOTE — PROGRESS NOTES
9/7/21 11:02 AM     Lidocaine Injection      NDC: [address]-00    Lot Number: [id number]    Body Part: left knee

## 2021-09-07 NOTE — PROGRESS NOTES
Chief Complaint  Follow-up (left knee. pt state knee is stronger, however continues to have pain with activity )      History of Present Illness:  Vernell Dominguez is a pleasant 31 y.o. male who presents today for follow up evaluation of left knee pain. He underwent an ACL reconstruction using hamstring autograft 8 years ago by Dr. Susan Varela. Since then he has continued to have feelings of instability, loss of motion and pain. He was last seen about a month ago, and his symptoms and exam are indicative of patellofemoral osteoarthritis with some rotary instability. He was placed in formal supervised physical therapy. He states he has seen improvement in his strength and stability but continues to complain of pain with stairs and running. No new injuries reported. Medical History:  Patient's medications, allergies, past medical, surgical, social and family histories were reviewed and updated as appropriate. Pertinent items are noted in HPI  Review of systems reviewed from Patient History Form completed today and available in the patient's chart under the Media tab.          Pain Assessment  Location of Pain: Knee  Location Modifiers: Left  Severity of Pain: 5  Quality of Pain: Sharp, Aching  Duration of Pain: A few minutes  Frequency of Pain: Intermittent  Aggravating Factors: Stairs (running)  Limiting Behavior: Yes  Relieving Factors: Rest  Result of Injury: No  Work-Related Injury: No  Are there other pain locations you wish to document?: No    Past Medical History:   Diagnosis Date    Environmental allergies 6/14/2021        Past Surgical History:   Procedure Laterality Date    ANTERIOR CRUCIATE LIGAMENT REPAIR  3/22/2012    left knee    INGUINAL HERNIA REPAIR Right        Family History   Problem Relation Age of Onset    Arrhythmia Mother         A Fib    No Known Problems Brother     Heart Disease Maternal Grandfather        Social History     Socioeconomic History    Marital status:      Spouse name: None    Number of children: 0    Years of education: None    Highest education level: None   Occupational History    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, helping startups   Tobacco Use    Smoking status: Never Smoker    Smokeless tobacco: Never Used   Substance and Sexual Activity    Alcohol use: Yes     Alcohol/week: 0.0 standard drinks     Types: 5 - 8 Cans of beer per week     Comment: weekends    Drug use: No    Sexual activity: Yes     Partners: Female   Other Topics Concern    None   Social History Narrative    None     Social Determinants of Health     Financial Resource Strain:     Difficulty of Paying Living Expenses:    Food Insecurity:     Worried About Running Out of Food in the Last Year:     Ran Out of Food in the Last Year:    Transportation Needs:     Lack of Transportation (Medical):  Lack of Transportation (Non-Medical):    Physical Activity:     Days of Exercise per Week:     Minutes of Exercise per Session:    Stress:     Feeling of Stress :    Social Connections:     Frequency of Communication with Friends and Family:     Frequency of Social Gatherings with Friends and Family:     Attends Christianity Services:     Active Member of Clubs or Organizations:     Attends Club or Organization Meetings:     Marital Status:    Intimate Partner Violence:     Fear of Current or Ex-Partner:     Emotionally Abused:     Physically Abused:     Sexually Abused:        Current Outpatient Medications   Medication Sig Dispense Refill    Cetirizine HCl (ZYRTEC PO) Take by mouth      fluticasone (FLONASE) 50 MCG/ACT nasal spray 1 spray by Each Nostril route daily      sildenafil (VIAGRA) 100 MG tablet Take 1 tablet by mouth daily as needed for Erectile Dysfunction 10 tablet 1     No current facility-administered medications for this visit.        No Known Allergies    Vital signs:  Temp 98 °F (36.7 °C)   Ht 6' 2\" (1.88 m)   Wt 215 lb (97.5 kg)   BMI 27.60 kg/m²              Left Diagnosis   Name Primary?  Osteoarthritis of left patellofemoral joint Yes       Office Procedures:  No orders of the defined types were placed in this encounter. Plan: Pertinent imaging was reviewed. The etiology, natural history, and treatment options for the disorder were discussed. The roles of activity medication, antiinflammatories, injections, bracing, physical therapy, and surgical interventions were all described to the patient and questions were answered. He has seen improvement in his strength and stability but continues to have pain with stairs and running due to the chondral defect of the patella. I believe he is a candidate for an intraarticular lubricant injection for this defect. He wishes to proceed. He can continue flexibility and strengthening exercises at home. We discussed if no improvement we will consider surgical intervention including a patch. The postinjection information sheet was provided. The risks benefits and alternatives to Monovisc injection were discussed with the patient. The patient has undergone treatment with physical therapy anti-inflammatory medication and steroid injection in the past and has been unresponsive. X-rays confirm that there is significant osteoarthritis. After informed consent was obtained, the injection site was prepped with chlorhexidine following which the skin was anesthetized with ethyl chloride. The injection site was then prepared with 4 mL of 1% lidocaine following which 6cc (48 mg) of Monovisc was placed into the LEFT knee without complication. The patient was able to flex the knee to 90° immediately after the injection. The patient was advised to take it easy the next few days and ice and if there is any soreness. The patient was advised to contact us if any swelling, redness or increasing pain develops. All questions were answered and the patient will see me for followup on as-needed basis.     I will see him back in 1 month, sooner if symptoms worsen. Dallas Camille is in agreement with this plan. All questions were answered to patient's satisfaction and was encouraged to call with any further questions. Total time spent for evaluation, education and development of treatment plan: 30 minutes        IJohn ATC, am scribing for and in the presence of Dr. Kaylin Diaz. 09/07/21 10:47 AM John Magaña ATC    I attest that I met personally with the patient, performed the described exam, reviewed the radiographic studies and medical records associated with this patient and supervised the services that are described above.      Rivera Tello MD

## 2021-09-17 ENCOUNTER — OFFICE VISIT (OUTPATIENT)
Dept: PRIMARY CARE CLINIC | Age: 36
End: 2021-09-17
Payer: COMMERCIAL

## 2021-09-17 VITALS
BODY MASS INDEX: 27.73 KG/M2 | SYSTOLIC BLOOD PRESSURE: 122 MMHG | HEIGHT: 75 IN | DIASTOLIC BLOOD PRESSURE: 80 MMHG | TEMPERATURE: 98.2 F | WEIGHT: 223 LBS | OXYGEN SATURATION: 98 % | HEART RATE: 70 BPM

## 2021-09-17 DIAGNOSIS — M79.641 HAND PAIN, RIGHT: Primary | ICD-10-CM

## 2021-09-17 DIAGNOSIS — Z23 FLU VACCINE NEED: ICD-10-CM

## 2021-09-17 PROCEDURE — 90471 IMMUNIZATION ADMIN: CPT | Performed by: FAMILY MEDICINE

## 2021-09-17 PROCEDURE — 90686 IIV4 VACC NO PRSV 0.5 ML IM: CPT | Performed by: FAMILY MEDICINE

## 2021-09-17 PROCEDURE — 99213 OFFICE O/P EST LOW 20 MIN: CPT | Performed by: FAMILY MEDICINE

## 2021-09-17 ASSESSMENT — ENCOUNTER SYMPTOMS
NAUSEA: 0
ABDOMINAL PAIN: 0
COUGH: 0
SHORTNESS OF BREATH: 0
SORE THROAT: 0

## 2021-09-17 NOTE — PROGRESS NOTES
[address] [facility] [address] PRIMARY [facility]ARE  [address] [facility] [address]  Dept: [phone]  Dept Fax: [phone]     9/17/2021      Barbra Finely   1985     Chief Complaint   Patient presents with    Pain     R hand       HPI  Pt comes in today for eval:    R HAND: Pain located middle finger down into palm. Worst pain is first this in AM - with grasping and push/pulling. This is not constant. Seems random. Present for at least 4-6 months. Seems like it was worst at end of August. No obvious injury. No swelling noted. Using Advil some times, but it is intermittent. Seems to be very short in nature. Maybe only thing that is different is working at home in new environment. No weakness or change in sensation. PHQ Scores 6/14/2021   PHQ2 Score 0   PHQ9 Score 0     Interpretation of Total Score Depression Severity: 1-4 = Minimal depression, 5-9 = Mild depression, 10-14 = Moderate depression, 15-19 = Moderately severe depression, 20-27 = Severe depression     Prior to Visit Medications    Medication Sig Taking? Authorizing Provider   Cetirizine HCl (ZYRTEC PO) Take by mouth Yes Historical Provider, MD   fluticasone (FLONASE) 50 MCG/ACT nasal spray 1 spray by Each Nostril route daily Yes Historical Provider, MD   sildenafil (VIAGRA) 100 MG tablet Take 1 tablet by mouth daily as needed for Erectile Dysfunction Yes Avinash April, DO       Past Medical History:   Diagnosis Date    Environmental allergies 6/14/2021        Social History     Tobacco Use    Smoking status: Never Smoker    Smokeless tobacco: Never Used   Substance Use Topics    Alcohol use:  Yes     Alcohol/week: 0.0 standard drinks     Types: 5 - 8 Cans of beer per week     Comment: weekends    Drug use: No        Past Surgical History:   Procedure Laterality Date    ANTERIOR CRUCIATE LIGAMENT REPAIR  3/22/2012    left knee    INGUINAL HERNIA REPAIR Right         No Known Allergies     Family History Problem Relation Age of Onset    Arrhythmia Mother         A Fib    No Known Problems Brother     Heart Disease Maternal Grandfather         Patient's past medical history, surgical history, family history, medications, and allergies  were all reviewed and updated as appropriate today. Review of Systems   Constitutional: Negative for fever. HENT: Negative for ear pain and sore throat. Respiratory: Negative for cough and shortness of breath. Cardiovascular: Negative for chest pain. Gastrointestinal: Negative for abdominal pain and nausea. Musculoskeletal: Positive for arthralgias (R hand pain). Skin: Negative for rash. Neurological: Negative for dizziness and headaches. Hematological: Negative for adenopathy. /80   Pulse 70   Temp 98.2 °F (36.8 °C)   Ht 6' 3\" (1.905 m)   Wt 223 lb (101.2 kg)   SpO2 98%   BMI 27.87 kg/m²      Physical Exam  Vitals reviewed. Constitutional:       General: He is not in acute distress. HENT:      Head: Normocephalic. Nose: Nose normal.      Mouth/Throat:      Mouth: Mucous membranes are moist.   Eyes:      Extraocular Movements: Extraocular movements intact. Pupils: Pupils are equal, round, and reactive to light. Cardiovascular:      Rate and Rhythm: Normal rate and regular rhythm. Heart sounds: No murmur heard. Pulmonary:      Effort: Pulmonary effort is normal.      Breath sounds: Normal breath sounds. No wheezing. Abdominal:      General: Bowel sounds are normal.      Palpations: Abdomen is soft. Tenderness: There is no abdominal tenderness. Musculoskeletal:         General: No swelling or deformity. Right hand: Normal. No deformity or bony tenderness. Normal range of motion. Normal strength. Normal sensation. Cervical back: Neck supple. Lymphadenopathy:      Cervical: No cervical adenopathy. Skin:     General: Skin is warm and dry. Findings: No rash.    Neurological:      Mental Status: He

## 2021-09-21 ENCOUNTER — PATIENT MESSAGE (OUTPATIENT)
Dept: PRIMARY CARE CLINIC | Age: 36
End: 2021-09-21

## 2021-09-21 NOTE — TELEPHONE ENCOUNTER
From: Sylvain Moore  To: Li Harvey DO  Sent: 9/21/2021 12:55 PM EDT  Subject: Non-Urgent Medical Question    Covid vaccine card. Can you please add to my file? Thanks!

## 2021-11-11 ENCOUNTER — TELEPHONE (OUTPATIENT)
Dept: ORTHOPEDIC SURGERY | Age: 36
End: 2021-11-11

## 2021-11-11 NOTE — TELEPHONE ENCOUNTER
Dear PCP Provider: [facility]) has partnered with [facility] to utilize predictive analytics to improve the care management for patients with arthritic knee pain. Utilizing claims data and patient visit features, we have developed an algorithmic risk score to determine which patient's quality of life may be most impacted to their knee pain. The selection criteria for this  program are: 1) risk score greater than .85; 2) existing [address] [place], [place] [address] patient; and 3) seen for knee pain in the past 3 years. Based upon the predictive analytics risk score  program, your patient has a risk score of greater than . 85 (i.e. 85% probability in having their quality of life impacted by their knee pain). To assist with care management of your patient, a navigator will be contacting them to understand their knee pain status and to educate on the [address] Pain Program, including scheduling an appointment with a joint specialist or their PCP. If patient is already established with a [address] [place] [address] provider for their knee pain, we will follow up with the patient. If you feel this patient not appropriate to be contacted given other medical reasons, please reply back to the navigator within 7 days with reason why not to be contacted. Otherwise, the navigator will follow up with you on the details of the patient encounter after the call.  Thank you for your support for this program.

## 2021-11-17 ENCOUNTER — PATIENT MESSAGE (OUTPATIENT)
Dept: PRIMARY CARE CLINIC | Age: 36
End: 2021-11-17

## 2021-11-17 DIAGNOSIS — M79.641 HAND PAIN, RIGHT: Primary | ICD-10-CM

## 2021-11-17 NOTE — TELEPHONE ENCOUNTER
From: Yaima Llanes  To: Dr. Cristy Ruiz  Sent: 11/17/2021 11:33 AM EST  Subject: Non-Urgent Medical Question    Dr. Kathleen Mom,    After a couple of months of wearing the hand brace, I'm still feeling similar discomfort in my right hand. It still is not consistent, seems random, and generally catches me off guard. The only consistent way I have found to induce the pain is a handshake. I believe the next step was to potentially see a hand specialist? Is that right?     Thanks,    Myna Andgumaro

## 2021-11-18 ENCOUNTER — TELEPHONE (OUTPATIENT)
Dept: ORTHOPEDIC SURGERY | Age: 36
End: 2021-11-18

## 2021-11-18 NOTE — TELEPHONE ENCOUNTER
LVM for patient regarding the [address] [place] [address] Orthopedic joint pain program. Patient can call [phone] for more information or to schedule an appointment with a joint pain specialist.

## 2021-12-17 ENCOUNTER — OFFICE VISIT (OUTPATIENT)
Dept: ORTHOPEDIC SURGERY | Age: 36
End: 2021-12-17
Payer: COMMERCIAL

## 2021-12-17 VITALS — HEIGHT: 75 IN | BODY MASS INDEX: 27.73 KG/M2 | WEIGHT: 223 LBS | RESPIRATION RATE: 16 BRPM

## 2021-12-17 DIAGNOSIS — M65.30 TRIGGER FINGER, ACQUIRED: Primary | ICD-10-CM

## 2021-12-17 PROCEDURE — 20550 NJX 1 TENDON SHEATH/LIGAMENT: CPT | Performed by: ORTHOPAEDIC SURGERY

## 2021-12-17 PROCEDURE — 99244 OFF/OP CNSLTJ NEW/EST MOD 40: CPT | Performed by: ORTHOPAEDIC SURGERY

## 2021-12-17 NOTE — Clinical Note
Dear  Miranda Bond DO,    Thank you very much for your referral or Mr. Freddie Paige to me for evaluation and treatment of his Hand & Wrist condition. I appreciate your confidence in me and thank you for allowing me the opportunity to care for your patients. If I can be of any further assistance to you on this or any other patient, please do not hesitate to contact me. Sincerely,    Melodie Casiano.  Alesia Melendez MD

## 2021-12-18 NOTE — PATIENT INSTRUCTIONS
Information & Instructions   After Finger, Hand, Wrist, or Elbow Injection    Cyn Whitney MD    You have received an injection of local anesthetic (Bupivicaine without Epinephrine) for comfort & a steroid (Kenalog) for its strong anti-inflammatory effects. In order to give the medication a chance to reduce your inflammation and discomfort, it is recommended that you take it easy for a day or so. You may use your hand and arm as you feel comfortable, but you should avoid highly strenuous activity and heavy use for several days. Relief from the injection will often not begin for several days, and you may not feel full relief for up to one month. It is not uncommon to experience some local discomfort or pain at or around the injection site for a few days. To relieve these symptoms you may do the following if you feel necessary:       Apply ice to the affected area 20 minutes on and 20 minutes off. Do not apply ice directly to the skin. Use a thin layer (T-shirt, pillowcase, towel, etc.) to protect the skin. - If allowed by your other medical physicians, you may take -     2 Tylenol extra strength tablets every 4-6 hours       1-2 Aleve tablets twice a day     2-3 Advil tablets two to three times a day    If you are diabetic, the steroid medication may increase your blood sugar, so you are advised to monitor your sugar more closely so you can adjust it accordingly for a few days following your injection. If you need assistance with the control of you blood sugar, please contact you primary care physician for further advice. I will request that you please call the office one month after your injection at [phone] if you have not experienced relief of your symptoms (unless I have instructed you otherwise). If your injection has given you good relief of you symptoms as expected, then you only need to call the office if your symptoms return.

## 2021-12-18 NOTE — PROGRESS NOTES
Mr. Adam Martin is a 31 y.o. right handed man  who is seen today in Hand Surgical Consultation at the request of Yonathan Caballero DO.'    He presents today regarding right symptoms which have been present for approximately 8 months. A history of antecedent trauma or injury is Absent. He reports symptoms to include mild and sporadic pain and stiffness with no popping, catching or locking of the right Middle Finger. Finger symptoms are Frequently worse in the morning or overnight. He reports moderate pain located at the base of the symptomatic finger(s). Symptoms have been variable over time. Previous treatment has included conservative measures. He does not claim relation of his symptoms to his required work activities. He has not undergone any form of testing. I have today reviewed with Aadm Martin the clinically relevant, past medical history, medications, allergies,  family history, social history, and Review Of Systems & I have documented any details relevant to today's presenting complaints in my history above. Mr. Delicia Jay self-reported past medical history, medications, allergies,  family history, social history, and Review Of Systems have been scanned into the chart under the \"Media\" tab. Physical Exam:  Mr. Delicia Jay most recent vitals:  Vitals  Resp: 16  Height: 6' 3\" (190.5 cm)  Weight: 223 lb (101.2 kg)    He is well nourished, oriented to person, place & time. He demonstrates appropriate mood and affect as well as normal gait and station. Skin: Normal in appearance, Normal Color and Free of Lesions Bilaterally   Digital range of motion is without significant limitation on the Right, normal on the Left. Inducible triggering is noted upon flexion in the right Middle Finger. There is not an associated flexion contracture of the PIP joint. No other digit demonstrates evidence for stenosing tenosynovitis bilaterally.   Wrist range of motion is without significant limitation bilaterally. Sensation is normal in the Whole Hand bilaterally  Vascular examination reveals normal and good capillary refill bilaterally  Swelling is minimal in the symptomatic digit, absent elsewhere bilaterally  There is no evidence of gross joint instability bilaterally. Muscular strength is clinically appropriate bilaterally. Examination for Stenosing Tenosynovitis demonstrates mild tenderness, thickening & nodularity at the A-1 pulley(s) of the Right Middle Finger. There is a palpable Nota's Node. There is Inducible triggering on active flexion with pain. No other digits demonstrate evidence of Stenosing Tenosynovitis. Impression:  Mr. Shahram Kaur is showing clinical evidence of Stenosing Tenosynovitis (Trigger Finger) and presents requesting further treatment. Plan:    I have had a thorough discussion with Mr. Shahram Kaur regarding the treatment options available for his initially presenting Right Middle Finger stenosing tenosynovitis, which is causing him functional limitations. I have outlined for Mr. Shahram Kaur the benefits and consequences of the various treatment modalities, including a reasonable expectation for the long term success and the likelihood that further more aggressive treatment may be required for his current presenting condition. Based upon our current discussion and a reasonable understating of the options available to him, Mr. Shahram Kaur has selected to proceed with  an injection to the right Middle Finger Flexor Tendon Sheath. I have outlined for him the nature of the injection, and the pre, dinora and post injection considerations and the appropriate expectations for this injection. I have clearly explained to him that the above outlined treatment plan should not be expected to 'cure' his stenosing tenosynovitis, but we are rather treating the symptoms with which he presents.   He has understood that in order to achieve long lasting relief of his symptoms and to prevent future worsening or further damage, that definitive surgical treatment would be required. Mr. Bernardo Nunez  voiced an appropriate understanding of our discussion, the options available to him, and of the expectations of his selected  treatment. He did wish to proceed with Right Middle Finger Flexor Tendon Sheath injection. Procedure:  right Middle Finger Trigger Finger Injection  [first Injection]: After full discussion of the nature of this process and outlining a treatment plan with Mr. Bernardo Nunez, we discussed the complications, limitations, expectations, alternatives, and risks of injection of the flexor tendon sheath. I have explained the potential for bleeding, infection, potential side effects of the medication, and the remote possibility of damage to surrounding structures as result of the injection. He understood this information well and verbally consented to this treatment. The skin of the symptomatic digit was prepped with Isopropyl Alcohol and under aseptic conditions the flexor tendon sheath was injected with a combination of 1/2 ml of 0.25% Bupivacaine without Epinephrine and 20 mg of Triamcinolone (40 mg/ml). Good filling of the flexor sheath was noted. A dry sterile bandage was applied and the patient tolerated the injection without difficulty. I advised the patient of the expected response, possible reactions and the instructions for care of the hand. I have also discussed with Mr. Bernardo Nunez the other treatment options available to him for this condition. We have today selected to proceed with treatment by injection with steroid medication. He and I have agreed that if our current course of Injection treatment does not prove to be effective over the short term future, that he will schedule a follow-up appointment to discuss and select an alternate course of therapy including possibly further conservative treatment or surgical treatment.        Mr. Bernardo Nunez has been given a full verbal list of instructions and precautions related to his present condition. I have asked him to followup with me in the office at the prescribed time. He is also specifically requested to call or return to the office sooner if his symptoms change or worsen prior to the next scheduled appointment.

## 2022-03-25 ENCOUNTER — NURSE ONLY (OUTPATIENT)
Dept: PRIMARY CARE CLINIC | Age: 37
End: 2022-03-25
Payer: COMMERCIAL

## 2022-03-25 DIAGNOSIS — Z23 NEED FOR TDAP VACCINATION: Primary | ICD-10-CM

## 2022-03-25 PROCEDURE — 90715 TDAP VACCINE 7 YRS/> IM: CPT | Performed by: FAMILY MEDICINE

## 2022-03-25 PROCEDURE — 90471 IMMUNIZATION ADMIN: CPT | Performed by: FAMILY MEDICINE

## 2022-05-16 ENCOUNTER — OFFICE VISIT (OUTPATIENT)
Dept: ORTHOPEDIC SURGERY | Age: 37
End: 2022-05-16
Payer: COMMERCIAL

## 2022-05-16 VITALS — WEIGHT: 220 LBS | BODY MASS INDEX: 28.23 KG/M2 | HEIGHT: 74 IN

## 2022-05-16 DIAGNOSIS — M25.562 LEFT KNEE PAIN, UNSPECIFIED CHRONICITY: Primary | ICD-10-CM

## 2022-05-16 PROCEDURE — 99214 OFFICE O/P EST MOD 30 MIN: CPT | Performed by: ORTHOPAEDIC SURGERY

## 2022-05-16 NOTE — PROGRESS NOTES
Chief Complaint  Follow-up (left knee )      History of Present Illness:  Zaid Anderson is a pleasant 31 y.o. male here today for follow-up regarding his left knee. He underwent an ACL reconstruction using hamstring autograft 9 years ago by Dr. Roseanne Apple. He was seen about a year ago in our clinic and his left knee MRI shows big patellar chondral defect for which he has received hyaluronic acid injection. He reports no improvement in his pain after the injection. He states that his pain is mostly at the anterior knee, 6/10 in severity, increases with going up and down stairs and with knee bending and squatting. He denies any significant knee instability. However he has some instability with walking on uneven surface. He also reports feeling of cracking and catching but no locking symptoms. He denies numbness, paresthesia, or any other neurological symptoms. Medical History:  Patient's medications, allergies, past medical, surgical, social and family histories were reviewed and updated as appropriate. Pertinent items are noted in HPI  Review of systems reviewed from Patient History Form dated on  and available in the patient's chart under the Media tab. Vital Signs: There were no vitals filed for this visit. Constitutional: In no apparent distress. Normal affect. Alert and oriented X3 and is cooperative. Left knee exam    Gait: No use of assistive devices. No antalgic gait. Alignment: normal alignment. Inspection/skin: Skin is intact without erythema or ecchymosis. No gross deformity. Palpation: Severe patellofemoral crepitus. no joint line tenderness present. Range of Motion: There is full range of motion. Strength: Normal quadriceps development. Effusion: No effusion or swelling present. Ligamentous stability: No cruciate or collateral ligament instability. Neurologic and vascular: Skin is warm and well-perfused. Sensation is intact to light-touch. Special tests: Negative Eh sign. Grade 1A pivot  Normal Q angle. Normal lower limb alignment. Left knee exam    Gait: No use of assistive devices. No antalgic gait. Alignment: normal alignment. Inspection/skin: Skin is intact without erythema or ecchymosis. No gross deformity. Palpation: no crepitus. no joint line tenderness present. Range of Motion: There is full range of motion. Strength: Normal quadriceps development. Effusion: No effusion or swelling present. Ligamentous stability: No cruciate or collateral ligament instability. Neurologic and vascular: Skin is warm and well-perfused. Sensation is intact to light-touch. Special tests: Negative Eh sign. Radiology:     No new radiographs taken in the office today. Assessment : 31years old male patient with patellar chondral defect for MRI to reevaluate the patellar cartilage    Impression:  Encounter Diagnosis   Name Primary?  Left knee pain, unspecified chronicity Yes       Office Procedures:  Orders Placed This Encounter   Procedures    MRI KNEE LEFT WO CONTRAST     Standing Status:   Future     Standing Expiration Date:   5/16/2023     Order Specific Question:   Reason for exam:     Answer:   MRI L KNEE W/3D EVAL PATELLA CARTILAGE     Order Specific Question:   Reason for exam:     Answer:   [place]  PUSH TO [place] PACS     Order Specific Question:   What is the sedation requirement? Answer:   None         Plan: Pertinent imaging was reviewed. The etiology, natural history, and treatment options for the disorder were discussed. The roles of activity medication, antiinflammatories, injections, bracing, physical therapy, and surgical interventions were all described to the patient and questions were answered. Patient has left knee pain. Is a previous MRI which was about a year ago shows large patellar chondral defect.   He has tried hyaluronic acid injection with no improvement in his symptoms. We have discussed the options in his case, we think he will be a candidate for left knee arthroscopy with chondral debridement and possible patching of his defect. Would like to have an MRI in his left knee to reevaluate his patellar cartilage for surgical planning. Would like to see him after the MRI. Aleja Rios is in agreement with this plan. All questions were answered to patient's satisfaction and was encouraged to call with any further questions. Total time spent for evaluation, education, and development of treatment plan: 50 minutes. 5/16/2022  2:32 PM      Jeff Carlson MD  Clinical Fellow at [place] [id number]    During this examination, Shayne Mora MD functioned as a scribe for Dr. Rick Reynoso. This dictation was performed with a verbal recognition program (DRAGON) and it was checked for errors. It is possible that there are still dictated errors within this office note. If so, please bring any errors to my attention for an addendum. All efforts were made to ensure that this office note is accurate. I attest that I met personally with the patient, performed the described exam, reviewed the radiographic studies and medical records associated with this patient and supervised the services that are described above.      Rick Noriega MD

## 2022-06-06 ENCOUNTER — OFFICE VISIT (OUTPATIENT)
Dept: ORTHOPEDIC SURGERY | Age: 37
End: 2022-06-06
Payer: COMMERCIAL

## 2022-06-06 VITALS — HEIGHT: 74 IN | WEIGHT: 220 LBS | BODY MASS INDEX: 28.23 KG/M2

## 2022-06-06 DIAGNOSIS — M23.8X2 CHONDRAL DEFECT OF LEFT PATELLA: Primary | ICD-10-CM

## 2022-06-06 PROCEDURE — 99214 OFFICE O/P EST MOD 30 MIN: CPT | Performed by: ORTHOPAEDIC SURGERY

## 2022-06-06 NOTE — PROGRESS NOTES
Chief Complaint  Follow-up (mri left knee )      History of Present Illness:  Tree Guardado is a pleasant 31 y.o. male who presents today for follow up evaluation of left knee pain. He is here for MRI results. He underwent an ACL reconstruction using hamstring autograft 9 years ago by Dr. Sapphire Becerra. He was seen about a year ago in our clinic and his left knee MRI shows big patellar chondral defect for which he has received hyaluronic acid injection. He reports no improvement in his pain after the injection. He states that his pain is mostly at the anterior knee, 6/10 in severity, increases with going up and down stairs and with knee bending and squatting. He denies any significant knee instability. However he has some instability with walking on uneven surface. He also reports feeling of cracking and catching but no locking symptoms. He denies numbness, paresthesia, or any other neurological symptoms. Medical History:  Patient's medications, allergies, past medical, surgical, social and family histories were reviewed and updated as appropriate. Pertinent items are noted in HPI  Review of systems reviewed from Patient History Form completed today and available in the patient's chart under the Media tab.               Past Medical History:   Diagnosis Date    Environmental allergies 6/14/2021        Past Surgical History:   Procedure Laterality Date    ANTERIOR CRUCIATE LIGAMENT REPAIR  3/22/2012    left knee    INGUINAL HERNIA REPAIR Right        Family History   Problem Relation Age of Onset    Arrhythmia Mother         A Fib    No Known Problems Brother     Heart Disease Maternal Grandfather        Social History     Socioeconomic History    Marital status:      Spouse name: Not on file    Number of children: 0    Years of education: Not on file    Highest education level: Not on file   Occupational History    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 No Known Allergies    Vital signs:  Ht 6' 2\" (1.88 m)   Wt 220 lb (99.8 kg)   BMI 28.25 kg/m²                 Left knee exam     Gait: No use of assistive devices. No antalgic gait.     Alignment: normal alignment.     Inspection/skin: Skin is intact without erythema or ecchymosis. No gross deformity.      Palpation: Severe patellofemoral crepitus. no joint line tenderness present.     Range of Motion: There is full range of motion.      Strength: Normal quadriceps development.      Effusion: No effusion or swelling present.      Ligamentous stability: No cruciate or collateral ligament instability.      Neurologic and vascular: Skin is warm and well-perfused. Sensation is intact to light-touch.      Special tests: Negative Eh sign. Grade 1A pivot  Normal Q angle. Normal lower limb alignment.     Left knee exam     Gait: No use of assistive devices. No antalgic gait.     Alignment: normal alignment.     Inspection/skin: Skin is intact without erythema or ecchymosis. No gross deformity.      Palpation: no crepitus. no joint line tenderness present.     Range of Motion: There is full range of motion.      Strength: Normal quadriceps development.      Effusion: No effusion or swelling present.      Ligamentous stability: No cruciate or collateral ligament instability.      Neurologic and vascular: Skin is warm and well-perfused. Sensation is intact to light-touch.      Special tests: Negative Eh sign. Radiology:     Pertinent imaging was interpreted and reviewed with the patient, both images and report. MRI of the left knee dated 5/25/2022 was interpreted and reviewed with the patient today. CONCLUSION:   1.  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   2. Background of patellofemoral dysplasia. 3. Intact ACL graft.  Previous medial meniscal trimming.  Chronic injury to the posterior    medial meniscocapsular junction or ramp 1 lesion. Assessment :  31year old male with patellar cartilage ulceration of left knee    Impression:  Encounter Diagnosis   Name Primary?  Chondral defect of left patella Yes       Office Procedures:  No orders of the defined types were placed in this encounter. Plan: Pertinent imaging was reviewed. The etiology, natural history, and treatment options for the disorder were discussed. The roles of activity medication, antiinflammatories, injections, bracing, physical therapy, and surgical interventions were all described to the patient and questions were answered. MRI shows a 1.5 x 2cm ulceration of the patellar cartilage in the left knee. He has tried conservative treatments including physical therapy and injections with no improvement. I believe he is a candidate for a left knee arthroscopy with cartilage allograft for patella. He will call the office back if he wishes to proceed. Emely Carrillo is in agreement with this plan. All questions were answered to patient's satisfaction and was encouraged to call with any further questions. Total time spent for evaluation, education and development of treatment plan: 35 minutes        Elizabet VIRK ATC, am scribing for and in the presence of Dr. Esther Castillo. 06/06/22 3:01 PM Elizabet Chaudhary ATC    I attest that I met personally with the patient, performed the described exam, reviewed the radiographic studies and medical records associated with this patient and supervised the services that are described above.      Sami Driver MD

## 2022-06-29 ENCOUNTER — TELEPHONE (OUTPATIENT)
Dept: ORTHOPEDIC SURGERY | Age: 37
End: 2022-06-29

## 2022-06-29 NOTE — TELEPHONE ENCOUNTER
Surgery and/or Procedure Scheduling     Contact Name: Itz Lunauton  Surgical/Procedure Request: TKR for late August  Patient Contact Number:[phone]

## 2022-08-11 ENCOUNTER — OFFICE VISIT (OUTPATIENT)
Dept: PRIMARY CARE CLINIC | Age: 37
End: 2022-08-11
Payer: COMMERCIAL

## 2022-08-11 VITALS
HEIGHT: 74 IN | OXYGEN SATURATION: 97 % | TEMPERATURE: 98.4 F | HEART RATE: 80 BPM | DIASTOLIC BLOOD PRESSURE: 65 MMHG | WEIGHT: 220 LBS | SYSTOLIC BLOOD PRESSURE: 112 MMHG | BODY MASS INDEX: 28.23 KG/M2

## 2022-08-11 DIAGNOSIS — Z00.00 ANNUAL PHYSICAL EXAM: Primary | ICD-10-CM

## 2022-08-11 DIAGNOSIS — Z01.818 PREOPERATIVE EXAMINATION: ICD-10-CM

## 2022-08-11 DIAGNOSIS — M23.8X2 CHONDRAL DEFECT OF LEFT PATELLA: ICD-10-CM

## 2022-08-11 PROCEDURE — 99395 PREV VISIT EST AGE 18-39: CPT | Performed by: FAMILY MEDICINE

## 2022-08-11 SDOH — ECONOMIC STABILITY: FOOD INSECURITY: WITHIN THE PAST 12 MONTHS, THE FOOD YOU BOUGHT JUST DIDN'T LAST AND YOU DIDN'T HAVE MONEY TO GET MORE.: NEVER TRUE

## 2022-08-11 SDOH — ECONOMIC STABILITY: FOOD INSECURITY: WITHIN THE PAST 12 MONTHS, YOU WORRIED THAT YOUR FOOD WOULD RUN OUT BEFORE YOU GOT MONEY TO BUY MORE.: NEVER TRUE

## 2022-08-11 ASSESSMENT — PATIENT HEALTH QUESTIONNAIRE - PHQ9
SUM OF ALL RESPONSES TO PHQ9 QUESTIONS 1 & 2: 0
SUM OF ALL RESPONSES TO PHQ QUESTIONS 1-9: 0
2. FEELING DOWN, DEPRESSED OR HOPELESS: 0
SUM OF ALL RESPONSES TO PHQ QUESTIONS 1-9: 0
1. LITTLE INTEREST OR PLEASURE IN DOING THINGS: 0
SUM OF ALL RESPONSES TO PHQ QUESTIONS 1-9: 0
SUM OF ALL RESPONSES TO PHQ QUESTIONS 1-9: 0

## 2022-08-11 ASSESSMENT — SOCIAL DETERMINANTS OF HEALTH (SDOH): HOW HARD IS IT FOR YOU TO PAY FOR THE VERY BASICS LIKE FOOD, HOUSING, MEDICAL CARE, AND HEATING?: NOT HARD AT ALL

## 2022-08-11 ASSESSMENT — ENCOUNTER SYMPTOMS
SORE THROAT: 0
SHORTNESS OF BREATH: 0
COUGH: 0
ABDOMINAL PAIN: 0
NAUSEA: 0

## 2022-08-11 NOTE — PROGRESS NOTES
[address] [facility] [address] PRIMARY CARE  [address] [place] [id number]  Dept: [phone]  Dept Fax: [phone]     8/11/2022      Greg Clark   1985     Chief Complaint   Patient presents with    Pre-op Exam       HPI  Pt comes in today for preoperative exam. Scheduled 8/25/22 with Dr Marta Melendez for the L knee. No acute concerns. Has had surgeries in past without any issues related to anesthesia or post op recovery. PHQ Scores 8/11/2022 6/14/2021   PHQ2 Score 0 0   PHQ9 Score 0 0     Interpretation of Total Score Depression Severity: 1-4 = Minimal depression, 5-9 = Mild depression, 10-14 = Moderate depression, 15-19 = Moderately severe depression, 20-27 = Severe depression     Prior to Visit Medications    Medication Sig Taking? Authorizing Provider   Cetirizine HCl (ZYRTEC PO) Take by mouth Yes Historical Provider, MD   fluticasone (FLONASE) 50 MCG/ACT nasal spray 1 spray by Each Nostril route daily Yes Historical Provider, MD       Past Medical History:   Diagnosis Date    Environmental allergies 6/14/2021        Social History     Tobacco Use    Smoking status: Never    Smokeless tobacco: Never   Substance Use Topics    Alcohol use: Yes     Alcohol/week: 0.0 standard drinks     Types: 5 - 8 Cans of beer per week     Comment: weekends    Drug use: No        Past Surgical History:   Procedure Laterality Date    ANTERIOR CRUCIATE LIGAMENT REPAIR  3/22/2012    left knee    INGUINAL HERNIA REPAIR Right         No Known Allergies     Family History   Problem Relation Age of Onset    Arrhythmia Mother         A Fib    No Known Problems Brother     Heart Disease Maternal Grandfather         Patient's past medical history, surgical history, family history, medications, and allergies  were all reviewed and updated as appropriate today. Review of Systems   Constitutional:  Negative for fever. HENT:  Negative for ear pain and sore throat.     Respiratory:  Negative for cough and shortness of breath. Cardiovascular:  Negative for chest pain. Gastrointestinal:  Negative for abdominal pain and nausea. Skin:  Negative for rash. Neurological:  Negative for dizziness and headaches. Hematological:  Negative for adenopathy. /65   Pulse 80   Temp 98.4 °F (36.9 °C)   Ht 6' 2\" (1.88 m)   Wt 220 lb (99.8 kg)   SpO2 97%   BMI 28.25 kg/m²      Physical Exam  Vitals reviewed. Constitutional:       General: He is not in acute distress. HENT:      Head: Normocephalic. Nose: Nose normal.      Mouth/Throat:      Mouth: Mucous membranes are moist.   Eyes:      Extraocular Movements: Extraocular movements intact. Pupils: Pupils are equal, round, and reactive to light. Cardiovascular:      Rate and Rhythm: Normal rate and regular rhythm. Heart sounds: No murmur heard. Pulmonary:      Effort: Pulmonary effort is normal.      Breath sounds: Normal breath sounds. No wheezing. Abdominal:      General: Bowel sounds are normal.      Palpations: Abdomen is soft. Tenderness: There is no abdominal tenderness. Musculoskeletal:         General: No swelling or deformity. Cervical back: Neck supple. Lymphadenopathy:      Cervical: No cervical adenopathy. Skin:     General: Skin is warm and dry. Findings: No rash. Neurological:      Mental Status: He is alert and oriented to person, place, and time. Cranial Nerves: No cranial nerve deficit. Psychiatric:         Mood and Affect: Mood normal.         Behavior: Behavior is cooperative. Assessment:  Encounter Diagnoses   Name Primary? Annual physical exam Yes    Chondral defect of left patella     Preoperative examination        Plan:  1. Annual physical exam  General wellness exam. Reviewed chart for past hx and updated today. Counseled on age appropriate health guidance and discussed screening recommendations. Vaccinations reviewed and discussed. All questions answered.  Reviewed labs from last summer - declines repeat today. 2. Chondral defect of left patella  Here for preop. 3. Preoperative examination  Upcoming surgical intervention discussed with patient and reviewed paperwork that was provided today. Chart review done including past surgical history, family history and any complications perioperative/postoperative in the past.  This history is benign. Patient has not had any issues with anesthesia. At this time we have discussed current medication, and recommendations for upcoming surgery based on specialist recommendations. We have reviewed the current status of any chronic medical conditions that we are currently managing. At this time I feel like patient is average risk for the described procedure. We will fax this note, along with any labs and EKG/imaging that has been done for preoperative evaluation. Return in about 1 year (around 8/11/2023), or Physical.               Fay Haney, DO     Please note that this chart was generated using dragon dictation software. Although every effort was made to ensure the accuracy of this automated transcription, some errors in transcription may have occurred.

## 2022-08-16 ENCOUNTER — OFFICE VISIT (OUTPATIENT)
Dept: ORTHOPEDIC SURGERY | Age: 37
End: 2022-08-16
Payer: COMMERCIAL

## 2022-08-16 VITALS — BODY MASS INDEX: 28.23 KG/M2 | HEIGHT: 74 IN | WEIGHT: 220 LBS

## 2022-08-16 DIAGNOSIS — M22.42 CHONDROMALACIA OF LEFT PATELLA: Primary | ICD-10-CM

## 2022-08-16 PROCEDURE — 99214 OFFICE O/P EST MOD 30 MIN: CPT | Performed by: ORTHOPAEDIC SURGERY

## 2022-08-16 PROCEDURE — L1832 KO ADJ JNT POS R SUP PRE CST: HCPCS | Performed by: ORTHOPAEDIC SURGERY

## 2022-08-16 NOTE — LETTER
[facility]  Surgery Precert & Billing Form:    DEMOGRAPHICS:                                                                                                       Patient Name:  Claudia Schmidt  Patient :  1985   Patient SS#:      Patient Phone:  [phone] (home)  Alt.  Patient Phone:    Patient Address:  [facility] [address] [address]    PCP:  Darya Escalante DO  Insurance: [employer]    DIAGNOSIS & PROCEDURE:                                                                                      Diagnosis: CHONDROMALACIA  D25.595  Operation: left    SURGERY  INFORMATION  Date of Surgery:   2022  Location:    [facility]  Type:    Outpatient  23 hour hold:  No  Surgeon:          Nereida Wood MD         22     BILLING INFORMATION:                                                                                                Physician Procedure                                            CPT Codes                      PA, or Fellow Procedure                                      CPT Codes                      Precert information:

## 2022-08-16 NOTE — PROGRESS NOTES
Chief Complaint  Follow-up (Left knee pain )      History of Present Illness:  Ang Moreau is a pleasant 30 y.o. male here for follow-up regarding his left knee. As a review, he underwent a ACL reconstruction using hamstring autograft 9 years ago. He was seen about a year ago in our clinic, MRI shows a patellar chondral defect. Treatment has included physical therapy and hyaluronic acid with no improvement. Patient has pain very focal to his anterior knee, this is worse when going up and down stairs or with a deep knee bend or squat. Denies any reported instability. Endorses a cracking catching but no locking. Denies any numbness or other neurological symptoms. At her last visit the option for a left knee arthroscopy with cartilage allograft for the patella was discussed. He has been scheduled and is here today for a preoperative visit. Medical History:  Patient's medications, allergies, past medical, surgical, social and family histories were reviewed and updated as appropriate. Pain Assessment  Location of Pain: Knee  Location Modifiers: Left  Severity of Pain: 5  Quality of Pain: Sharp, Dull, Aching  Frequency of Pain: Constant  Aggravating Factors: Bending, Standing, Walking, Stairs, Stretching  Limiting Behavior: Yes  Relieving Factors: Rest  Result of Injury: No  Work-Related Injury: No  ROS: Review of systems reviewed from Patient History Form completed today and available in the patient's chart under the Media tab. Pertinent items are noted in HPI  Review of systems reviewed from Patient History Form completed today and available in the patient's chart under the Media tab. Vital Signs:  Ht 6' 2\" (1.88 m)   Wt 220 lb (99.8 kg)   BMI 28.25 kg/m²           Left knee exam     Gait: No use of assistive devices. No antalgic gait. Alignment: normal alignment. Inspection/skin: Skin is intact without erythema or ecchymosis. No gross deformity.      Palpation: Severe patellofemoral crepitus. no joint line tenderness present. Range of Motion: There is full range of motion. Strength: Normal quadriceps development. Effusion: No effusion or swelling present. Ligamentous stability: No cruciate or collateral ligament instability. Neurologic and vascular: Skin is warm and well-perfused. Sensation is intact to light-touch. Special tests: Negative Eh sign. Grade 1A pivot  Normal Q angle. Normal lower limb alignment. Left knee exam     Gait: No use of assistive devices. No antalgic gait. Alignment: normal alignment. Inspection/skin: Skin is intact without erythema or ecchymosis. No gross deformity. Palpation: no crepitus. no joint line tenderness present. Range of Motion: There is full range of motion. Strength: Normal quadriceps development. Effusion: No effusion or swelling present. Ligamentous stability: No cruciate or collateral ligament instability. Neurologic and vascular: Skin is warm and well-perfused. Sensation is intact to light-touch. Special tests: Negative Eh sign. Radiology:       Pertinent imaging was interpreted and reviewed with the patient today, both images and report. MRI of the left knee dated 5/25/2022 was interpreted and reviewed with the patient today. CONCLUSION:   1. 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   2. Background of patellofemoral dysplasia. 3. Intact ACL graft. Previous medial meniscal trimming. Chronic injury to the posterior   medial meniscocapsular junction or ramp 1 lesion. 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     Chondromalacia of left patella Yes Office Procedures:  Orders Placed This Encounter   Procedures    Breg T Scope Knee Brace     Patient was prescribed a Breg T-Scope Brace. The left knee will require stabilization / immobilization from this semi-rigid / rigid orthosis to improve their function. The orthosis will assist in protecting the affected area, provide functional support and facilitate healing. The prefabricated orthosis was modified in the following manner to provide a customizable fit for the patient at the time of delivery. 1.  Identification of appropriate positioning and alignment of anatomical landmarks. 2.  Trimming of straps and adjustment of frame to specifically fit patient. 3.  Polycentric hinge adjustment in flexion and extension. The patient was educated and fit by a healthcare professional with expert knowledge and specialization in brace application while under the direct supervision of the treating physician. Verbal and written instructions for the use of and application of this item were provided. They were instructed to contact the office immediately should the brace result in increased pain, decreased sensation, increased swelling or worsening of the condition. Plan: Pertinent imaging was reviewed. The etiology, natural history, and treatment options for the disorder were discussed. The roles of activity medication, antiinflammatories, injections, bracing, physical therapy, and surgical interventions were all described to the patient and questions were answered. MRI shows a 1.5 x 2cm ulceration of the patellar cartilage in the left knee. He has tried conservative treatments including physical therapy and injections with no improvement. I believe he is a candidate for a left knee arthroscopy with open cartilage allograft for patella. Will see us back one week after the procedure. Risks, benefits and potential complications of arthroscopic surgery were discussed with the patient.   Risks discussed include but are not limited to bleeding, infection, anesthetic risk, injury to nerves and blood vessels, deep vein thrombosis, residual stiffness and weakness, and the need for revision surgery. The patient also understands that anesthetic risks include cardiopulmonary issues, drug reactions and even death. The patient voices an understanding of the importance of physical therapy and home exercises after surgery. All questions were answered. No personal history of blood clots. No Family history of blood clots. No personal history of bleeding disorders. No personal history antibiotic allergies. No personal intolerance or allergies to NSAIDs. No personal intolerance or allergies to narcotics. No personal diabetes. Plans to do postoperative physical therapy at [facility]. Jimmie Bergman is in agreement with this plan. All questions were answered to patient's satisfaction and was encouraged to call with any further questions. Total time spent for evaluation, education, and development of treatment plan: 39 minutes    Joni Baez, [facility]  8/16/2022    During this exam, I, Joni Baez PA-C, functioned as a scribe for Dr. Vilma Segura. The history taking and physical examination were performed by Dr. Vilma Segura. All counseling during the appointment was performed between the patient and Dr. Vilma Segura. 8/16/2022 3:05 PM    This dictation was performed with a verbal recognition program (DRAGON) and it was checked for errors. It is possible that there are still dictated errors within this office note. If so, please bring any areas to my attention for an addendum. All efforts were made to ensure that this office note is accurate. I attest that I met personally with the patient, performed the described exam, reviewed the radiographic studies and medical records associated with this patient and supervised the services that are described above.      Ese Vernon. Cayetano Maza MD

## 2022-08-17 ENCOUNTER — TELEPHONE (OUTPATIENT)
Dept: ORTHOPEDIC SURGERY | Age: 37
End: 2022-08-17

## 2022-08-17 NOTE — TELEPHONE ENCOUNTER
Authorization pended for missing information.  Please provide documentation of:  12 consecutive weeks of conservative therapy - within the last 12 months including dates and duration (please includes dates/duration for all of the following: steroid injection, modification of pain inducing activities, NSAIDs, orthotics, physical therapy with a home exercise program)  JUST RECEIVED VM REGARDING THIS  THEY WOULD LIKE A CALL WITHIN 1 HOUR OF THIS MESSAGE  PLEASE CALL [phone] AND USE [id number] AS REFERENCE NUMBER

## 2022-08-19 NOTE — TELEPHONE ENCOUNTER
APPROVED  CPT: 29303 [id number]  AUTH: [id number]  DATE RANGE: 8/25/22 TO 11/23/22  INSURANCE: [employer]  LOCATION [facility]  NOTE: DOC SCANNED

## 2022-08-29 NOTE — PROGRESS NOTES
(we) authorize and request that the above-named physician, his/her assistants or his/her designees, perform procedures as necessary and desirable if deemed to be in my (our) best interest.     Revised 8/2/2021                                                                          Page 1 of 2         I acknowledge that health care personnel may be observing this procedure for the purpose of medical education or other specified purposes as may be necessary as requested and/or approved by my (our) physician. I (we) consent to the disposal by the hospital Pathologist of the removed tissue, parts or organs in accordance with hospital policy. I do ____ do not ____ consent to the use of a local infiltration pain blocking agent that will be used by my provider/surgical provider to help alleviate pain during my procedure. I do ____ do not ____ consent to an emergent blood transfusion in the case of a life-threatening situation that requires blood components to be administered. This consent is valid for 24 hours from the beginning of the procedure. This patient does ____ or does not ____ currently have a DNR status/order. If DNR order is in place, obtain Addendum to the Surgical Consent for ALL Patients with a DNR Order to address dinora-operative status for limited intervention or DNR suspension.      I have read and fully understand the above Consent for Operation/Procedure and that all blanks were completed before I signed the consent.   _____________________________       _____________________      ____/____am/pm  Signature of Patient or legal representative      Printed Name / Relationship            Date / Time   ____________________________       _____________________      ____/____am/pm  Witness to Signature                                    Printed Name                    Date / Time    If patient is unable to sign or is a minor, complete the following)  Patient is a minor, ____ years of age, or unable to sign because:   ______________________________________________________________________________________________    If a phone consent is obtained, consent will be documented by using two health care professionals, each affirming that the consenting party has no questions and gives consent for the procedure discussed with the physician/provider.   _____________________          ____________________       _____/_____am/pm   2nd witness to phone consent        Printed name           Date / Time    Informed Consent:  I have provided the explanation described above in section 1 to the patient and/or legal representative.  I have provided the patient and/or legal representative with an opportunity to ask any questions about the proposed operation/procedure.   ___________________________          ____________________         ____/____am/pm  Provider / Proceduralist                            Printed name            Date / Time  Revised 8/2/2021                                                                      Page 2 of 2

## 2022-08-30 ENCOUNTER — HOSPITAL ENCOUNTER (OUTPATIENT)
Age: 37
Setting detail: OUTPATIENT SURGERY
Discharge: HOME | End: 2022-08-30
Attending: ORTHOPAEDIC SURGERY | Admitting: ORTHOPAEDIC SURGERY
Payer: COMMERCIAL

## 2022-08-30 ENCOUNTER — ANESTHESIA EVENT (OUTPATIENT)
Dept: OPERATING ROOM | Age: 37
End: 2022-08-30
Payer: COMMERCIAL

## 2022-08-30 ENCOUNTER — ANESTHESIA (OUTPATIENT)
Dept: OPERATING ROOM | Age: 37
End: 2022-08-30
Payer: COMMERCIAL

## 2022-08-30 VITALS
BODY MASS INDEX: 26.95 KG/M2 | WEIGHT: 210 LBS | RESPIRATION RATE: 13 BRPM | TEMPERATURE: 97 F | HEART RATE: 66 BPM | OXYGEN SATURATION: 95 % | HEIGHT: 74 IN | SYSTOLIC BLOOD PRESSURE: 115 MMHG | DIASTOLIC BLOOD PRESSURE: 76 MMHG

## 2022-08-30 DIAGNOSIS — M22.42 DEGENERATION OF ARTICULAR CARTILAGE OF PATELLA, LEFT: Primary | ICD-10-CM

## 2022-08-30 PROCEDURE — 3700000000 HC ANESTHESIA ATTENDED CARE: Performed by: ORTHOPAEDIC SURGERY

## 2022-08-30 PROCEDURE — 3600000014 HC SURGERY LEVEL 4 ADDTL 15MIN: Performed by: ORTHOPAEDIC SURGERY

## 2022-08-30 PROCEDURE — 7100000010 HC PHASE II RECOVERY - FIRST 15 MIN: Performed by: ORTHOPAEDIC SURGERY

## 2022-08-30 PROCEDURE — 6360000002 HC RX W HCPCS: Performed by: NURSE ANESTHETIST, CERTIFIED REGISTERED

## 2022-08-30 PROCEDURE — 2709999900 HC NON-CHARGEABLE SUPPLY: Performed by: ORTHOPAEDIC SURGERY

## 2022-08-30 PROCEDURE — 2500000003 HC RX 250 WO HCPCS: Performed by: NURSE ANESTHETIST, CERTIFIED REGISTERED

## 2022-08-30 PROCEDURE — 2580000003 HC RX 258: Performed by: ANESTHESIOLOGY

## 2022-08-30 PROCEDURE — 3700000001 HC ADD 15 MINUTES (ANESTHESIA): Performed by: ORTHOPAEDIC SURGERY

## 2022-08-30 PROCEDURE — 2580000003 HC RX 258: Performed by: ORTHOPAEDIC SURGERY

## 2022-08-30 PROCEDURE — 64447 NJX AA&/STRD FEMORAL NRV IMG: CPT | Performed by: ANESTHESIOLOGY

## 2022-08-30 PROCEDURE — 2720000010 HC SURG SUPPLY STERILE: Performed by: ORTHOPAEDIC SURGERY

## 2022-08-30 PROCEDURE — 6360000002 HC RX W HCPCS: Performed by: ANESTHESIOLOGY

## 2022-08-30 PROCEDURE — 6370000000 HC RX 637 (ALT 250 FOR IP): Performed by: ORTHOPAEDIC SURGERY

## 2022-08-30 PROCEDURE — 7100000001 HC PACU RECOVERY - ADDTL 15 MIN: Performed by: ORTHOPAEDIC SURGERY

## 2022-08-30 PROCEDURE — 2500000003 HC RX 250 WO HCPCS: Performed by: ANESTHESIOLOGY

## 2022-08-30 PROCEDURE — 6360000002 HC RX W HCPCS: Performed by: FAMILY MEDICINE

## 2022-08-30 PROCEDURE — 6360000002 HC RX W HCPCS: Performed by: ORTHOPAEDIC SURGERY

## 2022-08-30 PROCEDURE — 7100000000 HC PACU RECOVERY - FIRST 15 MIN: Performed by: ORTHOPAEDIC SURGERY

## 2022-08-30 PROCEDURE — 3600000004 HC SURGERY LEVEL 4 BASE: Performed by: ORTHOPAEDIC SURGERY

## 2022-08-30 PROCEDURE — 7100000011 HC PHASE II RECOVERY - ADDTL 15 MIN: Performed by: ORTHOPAEDIC SURGERY

## 2022-08-30 PROCEDURE — C1713 ANCHOR/SCREW BN/BN,TIS/BN: HCPCS | Performed by: ORTHOPAEDIC SURGERY

## 2022-08-30 DEVICE — GRAFT HUM TISS 1CC CART EXTRACELLULAR MTRX INJ BIOCART: Type: IMPLANTABLE DEVICE | Site: PATELLA | Status: FUNCTIONAL

## 2022-08-30 RX ORDER — KETOROLAC TROMETHAMINE 30 MG/ML
30 INJECTION, SOLUTION INTRAMUSCULAR; INTRAVENOUS ONCE
Status: COMPLETED | OUTPATIENT
Start: 2022-08-30 | End: 2022-08-30

## 2022-08-30 RX ORDER — ONDANSETRON 4 MG/1
4 TABLET, FILM COATED ORAL 3 TIMES DAILY PRN
Qty: 15 TABLET | Refills: 1 | Status: SHIPPED | OUTPATIENT
Start: 2022-08-30 | End: 2022-09-26

## 2022-08-30 RX ORDER — LIDOCAINE HYDROCHLORIDE 20 MG/ML
INJECTION, SOLUTION INTRAVENOUS PRN
Status: DISCONTINUED | OUTPATIENT
Start: 2022-08-30 | End: 2022-08-30 | Stop reason: SDUPTHER

## 2022-08-30 RX ORDER — SODIUM CHLORIDE 9 MG/ML
INJECTION, SOLUTION INTRAVENOUS PRN
Status: DISCONTINUED | OUTPATIENT
Start: 2022-08-30 | End: 2022-08-30 | Stop reason: HOSPADM

## 2022-08-30 RX ORDER — PROPOFOL 10 MG/ML
INJECTION, EMULSION INTRAVENOUS PRN
Status: DISCONTINUED | OUTPATIENT
Start: 2022-08-30 | End: 2022-08-30 | Stop reason: SDUPTHER

## 2022-08-30 RX ORDER — HYDRALAZINE HYDROCHLORIDE 20 MG/ML
10 INJECTION INTRAMUSCULAR; INTRAVENOUS
Status: DISCONTINUED | OUTPATIENT
Start: 2022-08-30 | End: 2022-08-30 | Stop reason: HOSPADM

## 2022-08-30 RX ORDER — ROCURONIUM BROMIDE 10 MG/ML
INJECTION, SOLUTION INTRAVENOUS PRN
Status: DISCONTINUED | OUTPATIENT
Start: 2022-08-30 | End: 2022-08-30 | Stop reason: SDUPTHER

## 2022-08-30 RX ORDER — ASPIRIN 325 MG
325 TABLET, DELAYED RELEASE (ENTERIC COATED) ORAL DAILY
Qty: 30 TABLET | Refills: 1 | Status: SHIPPED | OUTPATIENT
Start: 2022-08-30 | End: 2022-10-24 | Stop reason: ALTCHOICE

## 2022-08-30 RX ORDER — OXYCODONE HYDROCHLORIDE AND ACETAMINOPHEN 5; 325 MG/1; MG/1
1 TABLET ORAL
Status: COMPLETED | OUTPATIENT
Start: 2022-08-30 | End: 2022-08-30

## 2022-08-30 RX ORDER — MIDAZOLAM HYDROCHLORIDE 1 MG/ML
INJECTION INTRAMUSCULAR; INTRAVENOUS PRN
Status: DISCONTINUED | OUTPATIENT
Start: 2022-08-30 | End: 2022-08-30 | Stop reason: SDUPTHER

## 2022-08-30 RX ORDER — SODIUM CHLORIDE, SODIUM LACTATE, POTASSIUM CHLORIDE, CALCIUM CHLORIDE 600; 310; 30; 20 MG/100ML; MG/100ML; MG/100ML; MG/100ML
INJECTION, SOLUTION INTRAVENOUS CONTINUOUS
Status: DISCONTINUED | OUTPATIENT
Start: 2022-08-30 | End: 2022-08-30 | Stop reason: HOSPADM

## 2022-08-30 RX ORDER — OXYCODONE HYDROCHLORIDE AND ACETAMINOPHEN 5; 325 MG/1; MG/1
1 TABLET ORAL EVERY 6 HOURS PRN
Qty: 28 TABLET | Refills: 0 | Status: SHIPPED | OUTPATIENT
Start: 2022-08-30 | End: 2022-09-06

## 2022-08-30 RX ORDER — FENTANYL CITRATE 50 UG/ML
INJECTION, SOLUTION INTRAMUSCULAR; INTRAVENOUS PRN
Status: DISCONTINUED | OUTPATIENT
Start: 2022-08-30 | End: 2022-08-30 | Stop reason: SDUPTHER

## 2022-08-30 RX ORDER — BUPIVACAINE HYDROCHLORIDE 5 MG/ML
INJECTION, SOLUTION EPIDURAL; INTRACAUDAL PRN
Status: DISCONTINUED | OUTPATIENT
Start: 2022-08-30 | End: 2022-08-30 | Stop reason: SDUPTHER

## 2022-08-30 RX ORDER — FENTANYL CITRATE 50 UG/ML
INJECTION, SOLUTION INTRAMUSCULAR; INTRAVENOUS
Status: COMPLETED
Start: 2022-08-30 | End: 2022-08-30

## 2022-08-30 RX ORDER — ROCURONIUM BROMIDE 10 MG/ML
INJECTION, SOLUTION INTRAVENOUS PRN
Status: DISCONTINUED | OUTPATIENT
Start: 2022-08-30 | End: 2022-08-30

## 2022-08-30 RX ORDER — MEPERIDINE HYDROCHLORIDE 25 MG/ML
12.5 INJECTION INTRAMUSCULAR; INTRAVENOUS; SUBCUTANEOUS EVERY 5 MIN PRN
Status: DISCONTINUED | OUTPATIENT
Start: 2022-08-30 | End: 2022-08-30 | Stop reason: HOSPADM

## 2022-08-30 RX ORDER — SODIUM CHLORIDE 0.9 % (FLUSH) 0.9 %
5-40 SYRINGE (ML) INJECTION EVERY 12 HOURS SCHEDULED
Status: DISCONTINUED | OUTPATIENT
Start: 2022-08-30 | End: 2022-08-30 | Stop reason: HOSPADM

## 2022-08-30 RX ORDER — PROCHLORPERAZINE EDISYLATE 5 MG/ML
5 INJECTION INTRAMUSCULAR; INTRAVENOUS
Status: DISCONTINUED | OUTPATIENT
Start: 2022-08-30 | End: 2022-08-30 | Stop reason: HOSPADM

## 2022-08-30 RX ORDER — ONDANSETRON 2 MG/ML
INJECTION INTRAMUSCULAR; INTRAVENOUS PRN
Status: DISCONTINUED | OUTPATIENT
Start: 2022-08-30 | End: 2022-08-30 | Stop reason: SDUPTHER

## 2022-08-30 RX ORDER — SODIUM CHLORIDE 0.9 % (FLUSH) 0.9 %
5-40 SYRINGE (ML) INJECTION PRN
Status: DISCONTINUED | OUTPATIENT
Start: 2022-08-30 | End: 2022-08-30 | Stop reason: HOSPADM

## 2022-08-30 RX ORDER — SENNA PLUS 8.6 MG/1
1 TABLET ORAL 2 TIMES DAILY
Qty: 60 TABLET | Refills: 0 | Status: SHIPPED | OUTPATIENT
Start: 2022-08-30 | End: 2022-09-26

## 2022-08-30 RX ORDER — MIDAZOLAM HYDROCHLORIDE 1 MG/ML
INJECTION INTRAMUSCULAR; INTRAVENOUS
Status: COMPLETED
Start: 2022-08-30 | End: 2022-08-30

## 2022-08-30 RX ORDER — GLYCOPYRROLATE 0.2 MG/ML
INJECTION INTRAMUSCULAR; INTRAVENOUS PRN
Status: DISCONTINUED | OUTPATIENT
Start: 2022-08-30 | End: 2022-08-30 | Stop reason: SDUPTHER

## 2022-08-30 RX ORDER — DEXAMETHASONE SODIUM PHOSPHATE 4 MG/ML
INJECTION, SOLUTION INTRA-ARTICULAR; INTRALESIONAL; INTRAMUSCULAR; INTRAVENOUS; SOFT TISSUE PRN
Status: DISCONTINUED | OUTPATIENT
Start: 2022-08-30 | End: 2022-08-30 | Stop reason: SDUPTHER

## 2022-08-30 RX ORDER — SODIUM CHLORIDE 9 MG/ML
25 INJECTION, SOLUTION INTRAVENOUS PRN
Status: DISCONTINUED | OUTPATIENT
Start: 2022-08-30 | End: 2022-08-30 | Stop reason: HOSPADM

## 2022-08-30 RX ORDER — KETOROLAC TROMETHAMINE 30 MG/ML
INJECTION, SOLUTION INTRAMUSCULAR; INTRAVENOUS
Status: DISCONTINUED
Start: 2022-08-30 | End: 2022-08-30 | Stop reason: HOSPADM

## 2022-08-30 RX ORDER — LIDOCAINE HYDROCHLORIDE 10 MG/ML
1 INJECTION, SOLUTION EPIDURAL; INFILTRATION; INTRACAUDAL; PERINEURAL
Status: DISCONTINUED | OUTPATIENT
Start: 2022-08-30 | End: 2022-08-30 | Stop reason: HOSPADM

## 2022-08-30 RX ADMIN — DEXAMETHASONE SODIUM PHOSPHATE 4 MG: 4 INJECTION, SOLUTION INTRAMUSCULAR; INTRAVENOUS at 10:56

## 2022-08-30 RX ADMIN — OXYCODONE AND ACETAMINOPHEN 1 TABLET: 5; 325 TABLET ORAL at 14:51

## 2022-08-30 RX ADMIN — MIDAZOLAM HYDROCHLORIDE 2 MG: 2 INJECTION, SOLUTION INTRAMUSCULAR; INTRAVENOUS at 09:15

## 2022-08-30 RX ADMIN — SODIUM CHLORIDE, POTASSIUM CHLORIDE, SODIUM LACTATE AND CALCIUM CHLORIDE: 600; 310; 30; 20 INJECTION, SOLUTION INTRAVENOUS at 08:45

## 2022-08-30 RX ADMIN — HYDROMORPHONE HYDROCHLORIDE 0.5 MG: 1 INJECTION, SOLUTION INTRAMUSCULAR; INTRAVENOUS; SUBCUTANEOUS at 13:06

## 2022-08-30 RX ADMIN — BUPIVACAINE HYDROCHLORIDE 30 ML: 5 INJECTION, SOLUTION EPIDURAL; INTRACAUDAL; PERINEURAL at 09:15

## 2022-08-30 RX ADMIN — PROPOFOL 50 MG: 10 INJECTION, EMULSION INTRAVENOUS at 11:11

## 2022-08-30 RX ADMIN — ONDANSETRON 4 MG: 2 INJECTION INTRAMUSCULAR; INTRAVENOUS at 10:56

## 2022-08-30 RX ADMIN — HYDROMORPHONE HYDROCHLORIDE 0.5 MG: 1 INJECTION, SOLUTION INTRAMUSCULAR; INTRAVENOUS; SUBCUTANEOUS at 14:47

## 2022-08-30 RX ADMIN — SODIUM CHLORIDE, POTASSIUM CHLORIDE, SODIUM LACTATE AND CALCIUM CHLORIDE: 600; 310; 30; 20 INJECTION, SOLUTION INTRAVENOUS at 11:16

## 2022-08-30 RX ADMIN — SUGAMMADEX 200 MG: 100 INJECTION, SOLUTION INTRAVENOUS at 12:05

## 2022-08-30 RX ADMIN — MIDAZOLAM HYDROCHLORIDE 2 MG: 1 INJECTION INTRAMUSCULAR; INTRAVENOUS at 10:41

## 2022-08-30 RX ADMIN — FENTANYL CITRATE 100 MCG: 50 INJECTION, SOLUTION INTRAMUSCULAR; INTRAVENOUS at 09:15

## 2022-08-30 RX ADMIN — KETOROLAC TROMETHAMINE 30 MG: 30 INJECTION, SOLUTION INTRAMUSCULAR at 15:13

## 2022-08-30 RX ADMIN — LIDOCAINE HYDROCHLORIDE 50 MG: 20 INJECTION, SOLUTION INTRAVENOUS at 10:46

## 2022-08-30 RX ADMIN — MEPERIDINE HYDROCHLORIDE 12.5 MG: 25 INJECTION, SOLUTION INTRAMUSCULAR; INTRAVENOUS; SUBCUTANEOUS at 12:45

## 2022-08-30 RX ADMIN — FENTANYL CITRATE 50 MCG: 50 INJECTION, SOLUTION INTRAMUSCULAR; INTRAVENOUS at 10:46

## 2022-08-30 RX ADMIN — CEFAZOLIN 2000 MG: 2 INJECTION, POWDER, FOR SOLUTION INTRAMUSCULAR; INTRAVENOUS at 10:40

## 2022-08-30 RX ADMIN — HYDROMORPHONE HYDROCHLORIDE 0.5 MG: 1 INJECTION, SOLUTION INTRAMUSCULAR; INTRAVENOUS; SUBCUTANEOUS at 12:49

## 2022-08-30 RX ADMIN — GLYCOPYRROLATE 0.2 MG: 0.2 INJECTION INTRAMUSCULAR; INTRAVENOUS at 10:45

## 2022-08-30 RX ADMIN — ROCURONIUM BROMIDE 50 MG: 10 INJECTION INTRAVENOUS at 10:48

## 2022-08-30 RX ADMIN — PROPOFOL 150 MG: 10 INJECTION, EMULSION INTRAVENOUS at 10:47

## 2022-08-30 RX ADMIN — FENTANYL CITRATE 50 MCG: 50 INJECTION, SOLUTION INTRAMUSCULAR; INTRAVENOUS at 11:10

## 2022-08-30 ASSESSMENT — PAIN DESCRIPTION - ORIENTATION
ORIENTATION: LEFT

## 2022-08-30 ASSESSMENT — PAIN SCALES - GENERAL
PAINLEVEL_OUTOF10: 8
PAINLEVEL_OUTOF10: 0
PAINLEVEL_OUTOF10: 6
PAINLEVEL_OUTOF10: 7
PAINLEVEL_OUTOF10: 0
PAINLEVEL_OUTOF10: 8
PAINLEVEL_OUTOF10: 3
PAINLEVEL_OUTOF10: 3
PAINLEVEL_OUTOF10: 7
PAINLEVEL_OUTOF10: 0
PAINLEVEL_OUTOF10: 3
PAINLEVEL_OUTOF10: 3
PAINLEVEL_OUTOF10: 0
PAINLEVEL_OUTOF10: 0
PAINLEVEL_OUTOF10: 8
PAINLEVEL_OUTOF10: 3
PAINLEVEL_OUTOF10: 0
PAINLEVEL_OUTOF10: 0
PAINLEVEL_OUTOF10: 3
PAINLEVEL_OUTOF10: 5
PAINLEVEL_OUTOF10: 3
PAINLEVEL_OUTOF10: 0
PAINLEVEL_OUTOF10: 3
PAINLEVEL_OUTOF10: 0
PAINLEVEL_OUTOF10: 6
PAINLEVEL_OUTOF10: 6
PAINLEVEL_OUTOF10: 0

## 2022-08-30 ASSESSMENT — PAIN DESCRIPTION - DESCRIPTORS
DESCRIPTORS: DULL
DESCRIPTORS: ACHING;THROBBING
DESCRIPTORS: ACHING

## 2022-08-30 ASSESSMENT — PAIN DESCRIPTION - LOCATION
LOCATION: KNEE

## 2022-08-30 ASSESSMENT — PAIN DESCRIPTION - PAIN TYPE
TYPE: SURGICAL PAIN
TYPE: SURGICAL PAIN

## 2022-08-30 NOTE — PROGRESS NOTES
Pt awakened from sleep, c/o left leg/knee pain, crackers and water offered, will give more IV dilaudid and po percocet

## 2022-08-30 NOTE — BRIEF OP NOTE
Brief Postoperative Note      Patient: Jesus Bianchi  YOB: 1985  MRN: [id number]    Date of Procedure: 8/30/2022    Pre-Op Diagnosis: Chondromalacia of left knee [M94.262]    Post-Op Diagnosis: Same       Procedure(s):  LEFT KNEE ARTHROSCOPY, CHONDROPLASTY, SYNOVECTIONY, OPEN PATELLA CARTILAGE TRANSPLANT  . Surgeon(s): Itz Kaminski MD    Assistant:  Surgical Assistant: Manuel Mayers  Fellow: Alex Walton MD    Anesthesia: General    Estimated Blood Loss (mL): Minimal    Complications: None    Specimens:   * No specimens in log *    Implants:  Implant Name Type Inv. Item Serial No.  Lot No. LRB No. Used Action   GRAFT HUM TISS 1CC CART EXTRACELLULAR MTRX INJ BIOC[employer] - [id number]  GRAFT HUM TISS 1CC CART EXTRACELLULAR MTRX INJ [id number] [employer]  Left 1 Implanted         Drains: * No LDAs found *    Findings: See dictated operative report.      Electronically signed by Alex Walton MD on 8/30/2022 at 12:02 PM

## 2022-08-30 NOTE — H&P
[place]    [id number]    [facility] Same Day Surgery Update H & P  Department of General Surgery   Surgical Service   Pre-operative History and Physical  Last H & P within the last 30 days. DIAGNOSIS:   Chondromalacia of left knee [M94.262]    Procedure(s):  LEFT KNEE ARTHROSCOPY, CHONDROPLASTY, OPEN CARTILAGE ALLOGRAFT OF PATELLA  . History obtained from: Patient interview and EHR     HISTORY OF PRESENT ILLNESS:   The patient is a 30 y.o. male with c/o left knee pain and swelling in the setting of MRI demonstrated patellar cartilage ulceration. Their symptoms have been recalcitrant to conservative treatment and the patient presents today for the above procedure. Illness Screening: Patient denies fever, chills, worsening cough. Past Medical History:        Diagnosis Date    Environmental allergies 6/14/2021     Past Surgical History:        Procedure Laterality Date    ANTERIOR CRUCIATE LIGAMENT REPAIR  3/22/2012    left knee    INGUINAL HERNIA REPAIR Right            Medications Prior to Admission:      Prior to Admission medications    Medication Sig Start Date End Date Taking? Authorizing Provider   Cetirizine HCl (ZYRTEC PO) Take by mouth    Historical Provider, MD   fluticasone (FLONASE) 50 MCG/ACT nasal spray 1 spray by Each Nostril route daily    Historical Provider, MD         Allergies:  Patient has no known allergies. PHYSICAL EXAM:      /69   Pulse 67   Temp 97.8 °F (36.6 °C) (Temporal)   Resp 15   Ht 6' 2\" (1.88 m)   Wt 210 lb (95.3 kg)   SpO2 96%   BMI 26.96 kg/m²      Airway:  Airway patent with no audible stridor    Heart:  Regular rate and rhythm, No murmur noted    Lungs:  No increased work of breathing, good air exchange, clear to auscultation bilaterally, no crackles or wheezing    Abdomen:  Soft, non-distended, non-tender, no masses palpated    ASSESSMENT AND PLAN    Patient is a 30 y.o. male with above specified procedure planned.     1.  The patients history and physical was obtained and signed off by the pre-admission testing department. Patient seen and focused exam done today- no new changes since last physical exam on 8/11/22    2. Access to ancillary services are available per request of the provider.     PRASHANT Romero - CNP     8/30/2022

## 2022-08-30 NOTE — ANESTHESIA POSTPROCEDURE EVALUATION
Department of Anesthesiology  Postprocedure Note    Patient: Tahco Cortez  MRN: [id number]  YOB: 1985  Date of evaluation: 8/30/2022      Procedure Summary     Date: 08/30/22 Room / Location: [facility] [address] [place] [address] / [facility]    Anesthesia Start: 6500 Anesthesia Stop: 0635    Procedures:       LEFT KNEE ARTHROSCOPY, CHONDROPLASTY, SYNOVECTIONY, OPEN PATELLA CARTILAGE TRANSPLANT (Left)      . (Left) Diagnosis:       Chondromalacia of left knee      (Chondromalacia of left knee [L54.122])    Surgeons:  Nereida Wood MD Responsible Provider: Shayla King DO    Anesthesia Type: General, Regional ASA Status: 1          Anesthesia Type: General, Regional    Ritesh Phase I: Ritesh Score: 10    Ritesh Phase II:        Anesthesia Post Evaluation    Patient location during evaluation: PACU  Patient participation: complete - patient participated  Level of consciousness: awake  Pain score: 0  Airway patency: patent  Nausea & Vomiting: no nausea and no vomiting  Complications: no  Cardiovascular status: blood pressure returned to baseline  Respiratory status: acceptable  Hydration status: euvolemic  Multimodal analgesia pain management approach

## 2022-08-30 NOTE — ANESTHESIA PRE PROCEDURE
Department of Anesthesiology  Preprocedure Note       Name:  Kristina Milner   Age:  30 y.o.  :  1985                                          MRN:  [id number]         Date:  2022      Surgeon: Noni Sanz): Oleg Andrews MD    Procedure: Procedure(s):  LEFT KNEE ARTHROSCOPY, CHONDROPLASTY, OPEN CARTILAGE ALLOGRAFT OF PATELLA  . Medications prior to admission:   Prior to Admission medications    Medication Sig Start Date End Date Taking? Authorizing Provider   ondansetron (ZOFRAN) 4 MG tablet Take 1 tablet by mouth 3 times daily as needed for Nausea or Vomiting 22  Yes Neda Haines MD   oxyCODONE-acetaminophen (PERCOCET) 5-325 MG per tablet Take 1 tablet by mouth every 6 hours as needed for Pain for up to 7 days. Intended supply: 7 days.  Take lowest dose possible to manage pain 22 Yes Neda Haines MD   senna (SENOKOT) 8.6 MG tablet Take 1 tablet by mouth 2 times daily 22 Yes Neda Haines MD   aspirin 325 MG EC tablet Take 1 tablet by mouth daily 22 Yes Neda Haines MD   Cetirizine HCl (ZYRTEC PO) Take by mouth    Historical Provider, MD   fluticasone (FLONASE) 50 MCG/ACT nasal spray 1 spray by Each Nostril route daily    Historical Provider, MD       Current medications:    Current Facility-Administered Medications   Medication Dose Route Frequency Provider Last Rate Last Admin    lidocaine PF 1 % injection 1 mL  1 mL IntraDERmal Once PRN James Tyler MD        lactated ringers infusion   IntraVENous Continuous James Tyler  mL/hr at 22 0903 NoRateChange at 22 0903    sodium chloride flush 0.9 % injection 5-40 mL  5-40 mL IntraVENous 2 times per day James Tyler MD        sodium chloride flush 0.9 % injection 5-40 mL  5-40 mL IntraVENous PRN James Tyler MD        0.9 % sodium chloride infusion   IntraVENous PRN James Tyler MD        ceFAZolin (ANCEF) 2,000 mg in sodium chloride 0.9 % 50 mL IVPB (mini-bag)  2,000 mg HCT 45.8 06/15/2021 08:29 AM    MCV 90.5 06/15/2021 08:29 AM    RDW 13.7 06/15/2021 08:29 AM     06/15/2021 08:29 AM       CMP:   Lab Results   Component Value Date/Time     06/15/2021 08:29 AM    K 4.2 06/15/2021 08:29 AM     06/15/2021 08:29 AM    CO2 26 06/15/2021 08:29 AM    BUN 15 06/15/2021 08:29 AM    CREATININE 0.9 06/15/2021 08:29 AM    GFRAA >60 06/15/2021 08:29 AM    AGRATIO 2.0 06/15/2021 08:29 AM    LABGLOM >60 06/15/2021 08:29 AM    GLUCOSE 92 06/15/2021 08:29 AM    PROT 7.0 06/15/2021 08:29 AM    CALCIUM 9.4 06/15/2021 08:29 AM    BILITOT 0.6 06/15/2021 08:29 AM    ALKPHOS 53 06/15/2021 08:29 AM    AST 25 06/15/2021 08:29 AM    ALT 23 06/15/2021 08:29 AM       POC Tests: No results for input(s): POCGLU, POCNA, POCK, POCCL, POCBUN, POCHEMO, POCHCT in the last 72 hours.     Coags: No results found for: PROTIME, INR, APTT    HCG (If Applicable): No results found for: PREGTESTUR, PREGSERUM, HCG, HCGQUANT     ABGs: No results found for: PHART, PO2ART, DCD4FCQ, VAA6XUC, BEART, S4DKWKKI     Type & Screen (If Applicable):  No results found for: LABABO, LABRH    Drug/Infectious Status (If Applicable):  No results found for: HIV, HEPCAB    COVID-19 Screening (If Applicable): No results found for: COVID19        Anesthesia Evaluation  Patient summary reviewed and Nursing notes reviewed no history of anesthetic complications:   Airway: Mallampati: I  TM distance: >3 FB   Neck ROM: full  Mouth opening: > = 3 FB   Dental: normal exam         Pulmonary:Negative Pulmonary ROS and normal exam                               Cardiovascular:  Exercise tolerance: good (>4 METS),       (-)  angina, orthopnea and PND      Rhythm: regular  Rate: normal           Beta Blocker:  Not on Beta Blocker         Neuro/Psych:   Negative Neuro/Psych ROS              GI/Hepatic/Renal: Neg GI/Hepatic/Renal ROS            Endo/Other: Negative Endo/Other ROS                    Abdominal:         (-) obese Abdomen: soft.      Vascular: negative vascular ROS. Other Findings:           Anesthesia Plan      general and regional     ASA 1           MIPS: Postoperative opioids intended and Prophylactic antiemetics administered. Anesthetic plan and risks discussed with patient. Plan discussed with CRNA.                     Theresa Stock DO   8/30/2022

## 2022-08-30 NOTE — ANESTHESIA PROCEDURE NOTES
Peripheral Block    Patient location during procedure: pre-op  Reason for block: procedure for pain, post-op pain management and at surgeon's request  Start time: 8/30/2022 9:15 AM  End time: 8/30/2022 9:20 AM  Staffing  Performed: anesthesiologist   Anesthesiologist: Jyothi Clemens DO  Preanesthetic Checklist  Completed: patient identified, IV checked, site marked, risks and benefits discussed, surgical/procedural consents, equipment checked, pre-op evaluation, timeout performed, anesthesia consent given, oxygen available, monitors applied/VS acknowledged, fire risk safety assessment completed and verbalized and blood product R/B/A discussed and consented  Peripheral Block   Patient position: supine  Prep: ChloraPrep  Provider prep: mask  Patient monitoring: continuous pulse ox, cardiac monitor, continuous capnometry, frequent blood pressure checks, IV access, oxygen and responsive to questions  Block type: Femoral  Adductor canal  Laterality: left  Injection technique: single-shot  Guidance: ultrasound guided    Needle   Needle type: insulated echogenic nerve stimulator needle   Needle gauge: 20 G  Needle localization: ultrasound guidance  Needle length: 8 cm  Assessment   Injection assessment: negative aspiration for heme, no paresthesia on injection, local visualized surrounding nerve on ultrasound and no intravascular symptoms  Paresthesia pain: none  Slow fractionated injection: yes  Hemodynamics: stable  Real-time US image taken/store: yes  Outcomes: uncomplicated and patient tolerated procedure well    Additional Notes  0.5% bupi-30mL

## 2022-08-30 NOTE — DISCHARGE INSTRUCTIONS
Tremayne Gan MD     KNEE POST-OPERATIVE INSTRUCTIONS    DRESSING/WOUND CARE    Your incisions have been closed with absorbable sutures which will not need to be removed. In addition, they have been covered with Dermabond, which will shield them from water. You will be instructed at your first postoperative visit when you may shower. Your dressing includes gauze sponges immediately adjacent to the skin which are held in place with a layer of sterile cotton padding. Your leg has also been wrapped in an Ace bandage to provide compression and help to prevent swelling. Your dressing will be removed at your first physical therapy visit. You may remove and rewrap the Ace bandage if it feels uncomfortable or too tight. Some bleeding may be on the dressing after surgery. Call if the stain increases to more than 2 inches in diameter. KNEE IMMOBILIZER    Depending on your surgery, you may also have an immobilizer brace on your leg. This is primarily for your comfort. The straps may be loosened, but the brace must be worn when you are sleeping or walking. It may be removed by your physical therapist when you have good     CRUTCHES/JOINT MOVEMENT     No weightbearing, allow your toe to lightly touchdown when walking and Use knee immobilizer    STRAIGHT LEG RAISES    Perform 10 times every 30 minutes while awake. When you begin these exercises, it is normal to feel pain in the front of your knee. You are not causing any damage to the joint by doing these exercises. He will avoid losing muscle mass in your thigh and will hasten your recovery. The more straight leg raises you can perform, the easier and more comfortable they will be. CRYOCUFF ICEMAN COLD THERAPY UNIT/ICE PACK    Many patients have found that the use of the controlled cold therapy system offers improved recovery and rehab following surgery. The devices used immediately after surgery to reduce pain and swelling.   Most patients use the unit for several weeks following surgery. It can be used in conjunction with physical therapy, work, and exercising. The instructions are included with the device. You may use it 20 minutes out of every hour while you are awake. Remove if it is uncomfortable. You may apply ice packs to the shoulder area 20 minutes out of every hour while you are awake if not using the iceman unit. Remove if it is uncomfortable. NERVE BLOCK    At the hospital, prior to your procedure, you have the option of receiving a nerve block of the knee. This would then make your knee numb for up to 1 day and it may take a few days for your full sensation to return. ASPIRIN-325 MG-START AFTER SURGERY  Take 1 aspirin daily until you are able to walk normally. Aspirin is a mild blood thinner and is given to help prevent blood clots following surgery  ** DO NOT TAKE ASPIRIN IF YOU HAVE AN ALLERGY TO THIS MEDICATION OR IF YOU HAVE A HISTORY OF ANY BLEEDING DISORDER**    ARIEL HOSE    White stockings have been provided for you to wear on the on operative leg to maintain blood flow and help avoid blood clots. You may discontinue use of the stockings when you are walking normally on the uninjured side. After the Ace bandage and dressing has been removed from your operated side, you can substitute the stocking for the Ace bandage when you are up and walking    BATHING    Keep your dressing dry. You may shower 5 days after your surgery. Avoid scrubbing incisions for 2 full weeks following her surgery. PAIN MEDICATION     Percocet 5/325m-2 tablets every 4-6 hours as needed for pain, Zofran 4m tablet every 8 hours as needed for nausea, and Senna 8.6m tablet every 12 hours as needed for constipation    You may take Motrin Advil or ibuprofen 1 to 2 tablets every 4-6 hours in addition to the above medication as needed.   ** IF YOU ARE TAKING TORADOL, DO NOT TAKE MOTRIN, ADVIL, OR IBUPROFEN**  **DO NOT TAKE EXTRA TYLENOL WHILE TAKING PERCOCET, [address], OR VICODIN. THESE MEDICATIONS ALSO CONTAIN TYLENOL. TAKING ADDITIONAL TYLENOL CAN CAUSE LIVER DAMAGE**    The pain medication may make you drowsy. Do not drink alcohol while taking pain medication. Do not operate a motor vehicle while taking pain medication. Nausea is a common side effect of narcotic pain medication. This can sometimes be helped by taking pain medication with food. You should drink plenty of water while taking pain medication. You should decrease the amount of medication you are taking each day following surgery. If your pain is not controlled by your pain medications or increases in severity after the first postoperative visit, please contact our office. CALL THE OFFICE IF ANY OF THE FOLLOWING OCCUR    TEMPERATURE ABOVE 101  DEVELOPMENT OF NEW NUMBNESS OR TINGLING  UNCONTROLLED NAUSEA OR VOMITING  EXCESSIVE BLEEDING  INCREASED PAIN  INABILITY TO URINATE    Wanda Allison, Karolynn Libman PA-C, and a Surgical Fellow are the providers that will be involved in your care. If you have any questions, comments, or concerns please contact Netta Schwarz, our , by calling [phone], or by emailing her at [email or web address]. [address] [facility]    There are potential side effects of anesthesia or sedation you may experience for the first 24 hours. These side effects include:    Confusion or Memory loss, Dizziness, or Delayed Reaction Times   [x]A responsible person should be with you for the next 24 hours. Do not operate any vehicles (automobiles, bicycles, motorcycles) or power tools or machinery for 24 hours. Do not sign any legal documents or make any legal decisions for 24 hours. Do not drink alcohol for 24 hours or while taking narcotic pain medication. Nausea    [x]Start with light diet and progress to your normal diet as you feel like eating.  However, if you experience nausea or repeated place a significant amount of stress on your body. Using your CPAP will help keep you safe and lessen the negative effects of that stress. FOLLOW-UP RECOVERY CARE:  [x]Call the office at [phone]  for follow-up appointment as scheduled and problems    Watch for these possible complications, symptoms, or side effects of anesthesia. Call physician if they or any other problems occur:  Signs of INFECTION   > Fever over 101°     > Redness, swelling, hardness or warmth at the operative site   >Foul smelling or cloudy drainage at the operative site   Unrelieved PAIN  Unrelieved NAUSEA  Blood soaked dressing. (Some oozing may be normal)  Inability to urinate      Numb, pale, blue, cold or tingling extremity      Physician:  dr. Seth Allison    The above instructions were reviewed with patient/significant other. The following additional patient specific information was reviewed with the patient/significant other:  [x]Procedure/physician specific instructions  [x]Medication information sheet(S) including potential side effects  []Elidas egress test  []Pain Ball management  []FAQ Catheter associated blood stream infections  []FAQ Surgical Site Infections  []Other-    I have read and understand the instructions given to me: ____________________________________________   (Patient/S.O. Signature)            Date/time 8/30/2022 1:21 PM         PACU:  [phone]   M-F 700 AM - 7 PM      SAME DAY SERVICES:  [phone] M-F 7AM-6PM        If you smoke STOP. We care about your health!

## 2022-08-30 NOTE — PROGRESS NOTES
PACU Transfer to hospitals    Vitals:    08/30/22 1451   BP: 118/84   Pulse: 88   Resp: 12   Temp: 96.9 °F (36.1 °C)   SpO2: 98%         Intake/Output Summary (Last 24 hours) at 8/30/2022 1505  Last data filed at 8/30/2022 1455  Gross per 24 hour   Intake 2208 ml   Output --   Net 2208 ml       Pain assessment:    Pain Level: 6    Patient transferred to care of hospitals RN.    8/30/2022 3:05 PM

## 2022-08-30 NOTE — PROGRESS NOTES
PRP blood draw x 2 peripheral attempts and LT wrist A-line final site for draw. OR nurse and CRNA here to take pt to OR , family in room.

## 2022-08-30 NOTE — PROGRESS NOTES
From OR drowsy, shivering and c/o pain 8/10, dressing CDI with ace wrap, knee immoblizer in place, ice applied, VSS. Report from CRNA.     S/P LEFT KNEE ARTHROSCOPY, CHONDROPLASTY, SYNOVECTIONY, OPEN PATELLA CARTILAGE TRANSPLANT - Left

## 2022-08-30 NOTE — PROGRESS NOTES
Ambulatory Surgery/Procedure Discharge Note    Vitals:    08/30/22 1509   BP: 115/76   Pulse: 66   Resp: 13   Temp: 97 °F (36.1 °C)   SpO2: 95%     Patient meets criteria for discharge per Ritesh score. In: 2208 [P.O.:200; I.V.:1958]  Out: -     Restroom use offered before discharge. Yes    Pain assessment:  present - adequately treated  Pain Level: 6    Pt and wife states \"ready to go home\". Pt alert and oriented x4. IV removed. Denies N/V. Patient states pain is tolerable. Left leg dressing-ACE bandage- C,D,I. Knee immobilizer in place. Voided prior to discharge. Discharge instructions given to pt and wife with pt permission. Pt and wife verbalized understanding of all instructions. Left with all belongings, prescriptions, and discharge instructions. Patient discharged to home/self care. Patient discharged via wheel chair by transporter to waiting family.        8/30/2022 4:32 PM

## 2022-08-30 NOTE — ANESTHESIA PROCEDURE NOTES
Arterial Line:    An arterial line was placed using ultrasound guidance, in the pre-op for the following indication(s): blood sampling needed. A 20 gauge (size), 3/4 inch (length), Arrow (type) catheter was placed, Seldinger technique not used, into the left radial artery and versed sedation, secured by Tegaderm. Events:  patient tolerated procedure well with no complications and EBL < 5mL. Additional notes:  After multiple IV's able to draw blood but not that large a volume of blood  (104mL). Arterial line was placed under ultrasound guidance to allow 104mL blood for platelet rich plasma for surgical procedure. The collateral circulation tested and intact. Patient was awake and able to report pain/paresthesia during procedure. After procedure, evaluation of motor and sensory function was intact. Pressure was held manually for 5 minutes and pressure dressing applied at time of dressing placement no hematoma had developed.  Myself and CRNA will check the site periodically throughout case and post-op8/30/2022 10:15 AM8/30/2022 10:30 AM  Anesthesiologist: Rome Yoder DO  Performed: Anesthesiologist   Preanesthetic Checklist  Completed: patient identified, IV checked, site marked, risks and benefits discussed, surgical/procedural consents, equipment checked, pre-op evaluation, timeout performed, anesthesia consent given, oxygen available, monitors applied/VS acknowledged, fire risk safety assessment completed and verbalized and blood product R/B/A discussed and consented

## 2022-08-31 NOTE — OP NOTE
[facility] [address], [address]                                OPERATIVE REPORT    PATIENT NAME: Missy Zamudio                        :        1985  MED REC NO:   [id number]                          ROOM:  ACCOUNT NO:   [de-identified]                           ADMIT DATE: 2022  PROVIDER:     Waleska Mendez MD    DATE OF PROCEDURE:  2022    OPERATION:  Left knee arthroscopy with chondroplasty and synovectomy,  open patellar tendon cartilage transplant. SURGEON:  Waleska Mendez MD    ASSISTANT:  Enedina Pinedo MD    ANESTHESIA:  General with adductor canal block. PREOPERATIVE DIAGNOSIS:  Focal cartilage defect, medial facet patella. POSTOPERATIVE DIAGNOSES:  Focal cartilage defect, medial facet patella  with synovitis. PREPARATION:  ChloraPrep. INDICATIONS:  A 82-year-old man with remote history of knee trauma,  marked painful crepitus with active knee extension, refractory to  physical therapy and antiinflammatory medication; presents for  arthroscopic evaluation and treatment of open patellar tendon cartilage  grafting. Risks and benefits of surgery as well as nonsurgical  alternatives were discussed in detail with the patient, who understood  and consented to the operation. DESCRIPTION OF PROCEDURE:  I saw the patient in the holding area. He  confirmed the left knee was the operative extremity. We initialed the  operative site. He was taken to the OR and after induction of general  anesthesia, a tourniquet was placed on the left thigh. Left leg was  prepped and draped in a sterile fashion. A timeout was performed and OR  team agreed the left knee was the operative site. The leg was  exsanguinated with an Esmarch bandage. Tourniquet was inflated to 275  mmHg. Incision was made. A scope was placed into the joint. Knee was  visualized sequentially.   Immediately evidence of fragmented cartilage 2  x 2 cm involving the medial and lateral facet of the patella. The  trochlea appeared clean as was the medial and lateral compartments. Menisci were intact. ACL was intact. Synovitis was present medially,  laterally, and anteriorly; and three-compartment synovectomy was carried  out using synovial shaver. The scope was removed from the joint. An  incision was made over the anteromedial knee and carried down to the  extensor mechanism. Medial peripatellar arthrotomy was performed. Steinmann pin was placed on the medial border of the patella to allow it  to be everted. Following this, the cartilage defect was identified. Cartilage was removed down to bleeding bone. The calcified layer was  excised, multiple perforations/microfracture was carried out using a  power pick, following which biocartilage mixed with PRP that had been  collected in the holding area prior to the start of the case, was packed  and molded into the defect and contoured to the appropriate shape, it was then  sealed with fibrin glue and after five minutes, the wound was irrigated. The patella was reduced. The construct appeared stable. The extensor  mechanism was repaired with interrupted #1 Vicryl suture. Subcutaneous  tissue was closed in layers with Vicryl. Skin was closed with Monocryl. Tourniquet deflated. Sterile dressing was applied. The patient was  awakened and taken to the recovery room in stable condition. Sponge and  needle counts were correct at the conclusion of the procedure.   Blood  loss was minimal.        Randee Bustos MD    D: 08/30/2022 12:17:51       T: 08/30/2022 20:19:27     [email or web address]  Job#: [id number]     Doc#: [id number]    CC:

## 2022-09-02 ENCOUNTER — OFFICE VISIT (OUTPATIENT)
Dept: ORTHOPEDIC SURGERY | Age: 37
End: 2022-09-02
Payer: COMMERCIAL

## 2022-09-02 VITALS — WEIGHT: 210 LBS | BODY MASS INDEX: 26.95 KG/M2 | HEIGHT: 74 IN

## 2022-09-02 DIAGNOSIS — M22.42 CHONDROMALACIA OF LEFT PATELLA: Primary | ICD-10-CM

## 2022-09-02 DIAGNOSIS — M23.8X2 CHONDRAL DEFECT OF LEFT PATELLA: ICD-10-CM

## 2022-09-02 DIAGNOSIS — Z98.890 S/P LEFT KNEE ARTHROSCOPY: ICD-10-CM

## 2022-09-02 PROCEDURE — 20610 DRAIN/INJ JOINT/BURSA W/O US: CPT | Performed by: PHYSICIAN ASSISTANT

## 2022-09-02 PROCEDURE — 99024 POSTOP FOLLOW-UP VISIT: CPT | Performed by: PHYSICIAN ASSISTANT

## 2022-09-02 NOTE — PROGRESS NOTES
9/2/22 10:50 AM     Lidocaine Injection      NDC: [address]-26    Lot Number: [id number]    Body Part: LEFT KNEE

## 2022-09-02 NOTE — PROGRESS NOTES
Chief Complaint  Post-Op Check (Left knee. S/p 2 days scope )      History of Present Illness:  Kaycee Thakkar is a pleasant 30 y.o. male here for follow-up regarding his left knee. He is 3 days status post left knee arthroscopy with chondroplasty and synovectomy open patellar cartilage transplant. Patient doing very well with no concerns. Denies any fevers chills nausea vomiting. Denies numbness tingling. Denies any bleeding or oozing from surgical incision site. Has been compliant in his brace      Medical History:  Patient's medications, allergies, past medical, surgical, social and family histories were reviewed and updated as appropriate. Pain Assessment  Location of Pain: Knee  Location Modifiers: Left  Severity of Pain: 7  Quality of Pain: Dull, Aching  Duration of Pain: Persistent  Frequency of Pain: Constant  Aggravating Factors: Walking, Stairs  Limiting Behavior: Yes  Relieving Factors: Rest  Result of Injury: No  Work-Related Injury: No  Are there other pain locations you wish to document?: No  ROS: Review of systems reviewed from Patient History Form completed today and available in the patient's chart under the Media tab. Pertinent items are noted in HPI  Review of systems reviewed from Patient History Form completed today and available in the patient's chart under the Media tab. Vital Signs:  Ht 6' 2\" (1.88 m)   Wt 210 lb (95.3 kg)   BMI 26.96 kg/m²     Left knee exam    Gait: No use of assistive devices. No antalgic gait. Alignment: normal alignment. Inspection/skin: Incisions healing well. No indication of infection. No dehiscence. No drainage. No diffuse erythema. Palpation: Non tender to light touch    Range of Motion: There is full range of motion. Strength: Normal quadriceps development. Effusion: Minimal effusion    Ligamentous stability: No gross instability. Neurologic and vascular:  Calf soft and nontender. Skin is warm and well-perfused.  Sensation is aseptic technique and an aspiration was performed with ethyl chloride & 1% lidocaine (3 ml) for anesthetic:    60 ml of sanguinous serous fluid was aspirated. Patient tolerated the treatment well and received good relief. Appropriate post aspiration care instructions were provided. Grabiel Fragoso is in agreement with this plan. All questions were answered to patient's satisfaction and was encouraged to call with any further questions. Stacia Webster, [address] [facility], [place]  9/2/2022    This dictation was performed with a verbal recognition program [facility]) and it was checked for errors. It is possible that there are still dictated errors within this office note. If so, please bring any areas to my attention for an addendum. All efforts were made to ensure that this office note is accurate.

## 2022-09-09 ENCOUNTER — OFFICE VISIT (OUTPATIENT)
Dept: ORTHOPEDIC SURGERY | Age: 37
End: 2022-09-09

## 2022-09-09 ENCOUNTER — HOSPITAL ENCOUNTER (OUTPATIENT)
Dept: PHYSICAL THERAPY | Age: 37
Setting detail: THERAPIES SERIES
Discharge: HOME | End: 2022-09-09
Payer: COMMERCIAL

## 2022-09-09 VITALS — WEIGHT: 215 LBS | HEIGHT: 74 IN | BODY MASS INDEX: 27.59 KG/M2

## 2022-09-09 DIAGNOSIS — Z98.890 S/P LEFT KNEE ARTHROSCOPY: Primary | ICD-10-CM

## 2022-09-09 PROCEDURE — 97110 THERAPEUTIC EXERCISES: CPT | Performed by: PHYSICAL THERAPIST

## 2022-09-09 PROCEDURE — 99024 POSTOP FOLLOW-UP VISIT: CPT | Performed by: PHYSICIAN ASSISTANT

## 2022-09-09 PROCEDURE — 97016 VASOPNEUMATIC DEVICE THERAPY: CPT | Performed by: PHYSICAL THERAPIST

## 2022-09-09 PROCEDURE — 97161 PT EVAL LOW COMPLEX 20 MIN: CPT | Performed by: PHYSICAL THERAPIST

## 2022-09-09 PROCEDURE — 97112 NEUROMUSCULAR REEDUCATION: CPT | Performed by: PHYSICAL THERAPIST

## 2022-09-09 NOTE — FLOWSHEET NOTE
[facility] and [address] [place] [address], [facility] [address] [facility], [address] [place] [address]  Phone: [phone]   Fax:     [phone]    Physical Therapy Daily Treatment Note  Date:  2022    Patient Name:  Diana Mcelroy    :  1985  MRN: [id number]  Restrictions/Precautions:    Medical/Treatment Diagnosis Information:  Diagnosis: 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 SURGEON:  Howie Vasquez MD     ASSISTANT:  Abhishek Rajput MD     ANESTHESIA:  General with adductor canal block. PREOPERATIVE DIAGNOSIS:  Focal cartilage defect, medial facet patella. POSTOPERATIVE DIAGNOSES:  Focal cartilage defect, medial facet patella  with synovitis. Insurance/Certification information:  PT Insurance Information: [employer]here auth needed  Physician Information:   Howie Vasquez  Has the plan of care been signed (Y/N):        []  Yes  [x]  No     Date of Patient follow up with Physician: per md      Is this a Progress Report:     []  Yes  [x]  No        If Yes:  Date Range for reporting period:  Beginning  Ending    Progress report will be due (10 Rx or 30 days whichever is less): 16/3      Recertification will be due (POC Duration  / 90 days whichever is less):          Visit # Insurance Allowable Auth Required   1 60 per yr   [x]  Yes COHERE []  No        Functional Scale: Foto 21/100    Date assessed:         Latex Allergy:  [x]NO      []YES  Preferred Language for Healthcare:   [x]English       []other:    Pain level:  2-9/10     SUBJECTIVE:   See eval    OBJECTIVE: See eval       RESTRICTIONS/PRECAUTIONS: PF cartilage transplant, progress to full WB with TROM locked as quad recruitment improves. TROM locked for gait 0-4 weeks.   ROM 0-90  0-4 weeks then gradually increase to full by 8 weeks. Exercises/Interventions:   Exercise/Equipment Resistance/Repetitions Other comments   Stretching     Hamstring [id number]    Towel Pull [id number]    Inclined Calf     Hip Flexion     ITB     Groin     Quad                    SLR     Supine 3x5    Stand 2x10    Abduction 3x10    Adduction     EOB ext 2x10    SLR+          Isometrics     Quad sets [id number]    Adduc set [id number]    Patellar Glides     Medial     Superior     Inferior          ROM     Sheet Pulls [id number] 0-90 0-4 weeks    Hang Weights     Passive     Active     Weight Shift     Ankle Pumps                    CKC     Calf raises     Wall sits     Step ups     1 leg stand     Squatting     CC TKE     Balance     bridges          PRE     Extension  RANGE:   Flexion  RANGE:        Quantum machines     Leg press      Leg extension     Leg curl          Manual interventions                     Therapeutic Exercise and NMR EXR  [x] (68124) Provided verbal/tactile cueing for activities related to strengthening, flexibility, endurance, ROM for improvements in LE, proximal hip, and core control with self care, mobility, lifting, ambulation. [x] (61090) Provided verbal/tactile cueing for activities related to improving balance, coordination, kinesthetic sense, posture, motor skill, proprioception  to assist with LE, proximal hip, and core control in self care, mobility, lifting, ambulation and eccentric single leg control.      NMR and Therapeutic Activities:    [] (97664 or 73461) Provided verbal/tactile cueing for activities related to improving balance, coordination, kinesthetic sense, posture, motor skill, proprioception and motor activation to allow for proper function of core, proximal hip and LE with self care and ADLs  [] (51622) Gait Re-education- Provided training and instruction to the patient for proper LE, core and proximal hip recruitment and positioning and eccentric body weight control with ambulation re-education including up and down stairs     Home Exercise Program:    [x] (73740) Reviewed/Progressed HEP activities related to strengthening, flexibility, endurance, ROM of core, proximal hip and LE for functional self-care, mobility, lifting and ambulation/stair navigation   [] (33830)Reviewed/Progressed HEP activities related to improving balance, coordination, kinesthetic sense, posture, motor skill, proprioception of core, proximal hip and LE for self care, mobility, lifting, and ambulation/stair navigation      Manual Treatments:  PROM / STM / Oscillations-Mobs:  G-I, II, III, IV (PA's, Inf., Post.)  [] (10406) Provided manual therapy to mobilize LE, proximal hip and/or LS spine soft tissue/joints for the purpose of modulating pain, promoting relaxation,  increasing ROM, reducing/eliminating soft tissue swelling/inflammation/restriction, improving soft tissue extensibility and allowing for proper ROM for normal function with self care, mobility, lifting and ambulation. Modalities:  vaso 15    Charges:  Timed Code Treatment Minutes: 25   Total Treatment Minutes: 70       [x] EVAL (LOW) 80862 (typically 20 minutes face-to-face)  [] EVAL (MOD) 72401 (typically 30 minutes face-to-face)  [] EVAL (HIGH) 09150 (typically 45 minutes face-to-face)  [] RE-EVAL   [x] NJ(14337) x 1    [] IONTO  [x] NMR (37573) x  1   [] VASO  [] Manual (54354) x      [] Other:  [] TA x      [] Mech Traction (16858)  [] ES(attended) (11182)      [] ES (un) (62735):     GOALS:   Patient stated goal: return to light light sports    [] Progressing: [] Met: [] Not Met: [] Adjusted     Therapist goals for Patient:   Short Term Goals: To be achieved in: 2 -4weeks  1. Independent in HEP and progression per patient tolerance, in order to prevent re-injury. [] Progressing: [] Met: [] Not Met: [] Adjusted   2.  Patient will have a decrease in pain to facilitate improvement in movement, function, and ADLs as indicated by Functional Deficits. [] Progressing: [] Met: [] Not Met: [] Adjusted     Long Term Goals: To be achieved in: 12 weeks  1. Foto 62/100 to assist with reaching prior level of function. [] Progressing: [] Met: [] Not Met: [] Adjusted  2. Patient will demonstrate increased AROM to = R to allow for proper joint functioning as indicated by patients Functional Deficits. [] Progressing: [] Met: [] Not Met: [] Adjusted  3. Patient will demonstrate an increase in Strength to good proximal hip strength and control  in LE to allow for proper functional mobility as indicated by patients Functional Deficits. [] Progressing: [] Met: [] Not Met: [] Adjusted  4. Patient will return to  functional activities normal heel to toe gait in community, ascend and descend 12 steps alternating without increased symptoms or restriction. [] Progressing: [] Met: [] Not Met: [] Adjusted  Overall Progression Towards Functional goals/ Treatment Progress Update:  [] Patient is progressing as expected towards functional goals listed. [] Progression is slowed due to complexities/Impairments listed. [] Progression has been slowed due to co-morbidities. [x] Plan just implemented, too soon to assess goals progression <30days   [] Goals require adjustment due to lack of progress  [] Patient is not progressing as expected and requires additional follow up with physician  [] Other    Prognosis for POC: [x] Good [] Fair  [] Poor      Patient requires continued skilled intervention: [x] Yes  [] No    Treatment/Activity Tolerance:  [x] Patient able to complete treatment  [] Patient limited by fatigue  [x] Patient limited by pain     [] Patient limited by other medical complications  [] Other:     Patient Education:  Reviewed diagnosis, POC, HEP and its importance. Access Code: [email or web address]  URL: [email or web address]  Date: 09/09/2022  Prepared by: Jun Holder     Exercises  Long Sitting Calf Stretch with Strap - 2-3 x daily - 7 x weekly - 5 reps - 30 hold  Seated Table Hamstring Stretch - 2-3 x daily - 7 x weekly - 5 reps - 30 hold  Long Sitting Quad Set - 2-3 x daily - 7 x weekly - 10 reps - 10 hold  Supine Straight Leg Hip Adduction and Quad Set with Ball - 2-3 x daily - 7 x weekly - 10 reps - 10 hold  Sidelying Hip Abduction - 2-3 x daily - 7 x weekly - 3 sets - 10 reps  Prone Hip Extension on Table - 2-3 x daily - 7 x weekly - 3 sets - 10 reps - 1 hold  Supine Active Straight Leg Raise - 2-3 x daily - 7 x weekly - 3 sets - 10 reps - 1 hold  Standing Hip Flexion AROM - 2-3 x daily - 7 x weekly - 3 sets - 10 reps  Sitting Heel Slide with Towel - 2-3 x daily - 7 x weekly - 10 reps - 10 hold  PLAN: See eval  [] Continue per plan of care [] Alter current plan (see comments above)  [x] Plan of care initiated [] Hold pending MD visit [] Discharge      Electronically signed by:  Tayler Sood PT    Note: If patient does not return for scheduled/ recommended follow up visits, this note will serve as a discharge from care along with most recent update on progress.

## 2022-09-09 NOTE — PLAN OF CARE
[facility] [address]  Phone [phone]  Fax [phone]                                                       Physical Therapy Certification    Dear Armando Vieira  ,    We had the pleasure of evaluating the following patient for physical therapy services at [address] [place] [address]. A summary of our findings can be found in the initial assessment below. This includes our plan of care. If you have any questions or concerns regarding these findings, please do not hesitate to contact me at the office phone number checked above. Thank you for the referral.       Physician Signature:_______________________________Date:__________________  By signing above (or electronic signature), therapists plan is approved by physician      Patient: Alex Tidwell   : 1985   MRN: [id number]  Referring Physician:  Armando Vieira      Evaluation Date: 2022      Medical Diagnosis Information:  Diagnosis: M23.8X2 (ICD-10-CM) - Chondral defect of left patella   Treatment Diagnosis: L knee painM25.562             DATE OF PROCEDURE:  2022     OPERATION:  Left knee arthroscopy with chondroplasty and synovectomy,  open patellar tendon cartilage transplant. SURGEON:  Armando Vieira MD     ASSISTANT:  Cami Kevin MD     ANESTHESIA:  General with adductor canal block. PREOPERATIVE DIAGNOSIS:  Focal cartilage defect, medial facet patella. POSTOPERATIVE DIAGNOSES:  Focal cartilage defect, medial facet patella  with synovitis.                               Insurance information: PT Insurance Information: [employer]here auth needed     Precautions/ Contra-indications: PF cartilage transplant     C-SSRS Triggered by Intake questionnaire (Past 2 wk assessment):   [x] No, Questionnaire did not trigger screening.   [] Yes, Patient intake triggered further evaluation [] C-SSRS Screening completed  [] PCP notified via Plan of Care  [] Emergency services notified     Latex Allergy:  [x]NO      []YES  Preferred Language for Healthcare:   [x]English       []other:    SUBJECTIVE: Patient stated complaint:history of ACL recon L 2012. Never quite felt stable. Was able to return to running but not cutting jumping sports. Stopped running in 2018.  Here for post op visit    Relevant Medical History:ACL recon L 2012  Functional Disability Index: Foto 21/100    Pain Scale: 2-9/10  Easing factors: rest ice compression   Provocative factors: walking performing SLR prolonged standing greater then 5 min     Type: [x]Constant   []Intermittent  []Radiating []Localized []other:     Numbness/Tingling: none    Occupation/School: desk job    Living Status/Prior Level of Function: Independent with ADLs and IADLs,    Golf, walk  OBJECTIVE:      Flexibility L R Comment   Hamstring Restricted due to recent surgery Restricted due to recent surgery    Gastroc      ITB      Quad              ROM PROM AROM Overpressure Comment    L R L R L R    Flexion 60         Extension 0                                 Strength L R Comment   Quad fair     Hamstring      Gastroc      Hip  flexion      Hip abd                      Special Test Results/Comment   Meniscal Click    Crepitus    Flexion Test    Valgus Laxity    Varus Laxity    Lachmans    Drop Back    Homans neg           Girth L R   Mid Patella Moderate effusion    Suprapatellar moderate effucion    5cm above     15cm above Atrophy compared to R      Reflexes/Sensation:    [x]Dermatomes/Myotomes intact    []Reflexes equal and normal bilaterally   []Other:    Joint mobility: not assessed    []Normal    []Hypo   []Hyper    Palpation: general TTP due to recent surgery     Functional Mobility/Transfers: modified I    Posture: WFL for a 30 yr old male     Bandages/Dressings/Incisions: CDI    Gait: (include devices/WB status) TROM locked 0-4 weeks increase to FWB as quad improves    Orthopedic Special Tests:                        [x] Patient history, allergies, meds reviewed. Medical chart reviewed. See intake form. Review Of Systems (ROS):  [x]Performed Review of systems (Integumentary, CardioPulmonary, Neurological) by intake and observation. Intake form has been scanned into medical record. Patient has been instructed to contact their primary care physician regarding ROS issues if not already being addressed at this time. Co-morbidities/Complexities (which will affect course of rehabilitation):   [x]None           Arthritic conditions   []Rheumatoid arthritis (M05.9)  []Osteoarthritis (M19.91)   Cardiovascular conditions   []Hypertension (I10)  []Hyperlipidemia (E78.5)  []Angina pectoris (I20)  []Atherosclerosis (I70)   Musculoskeletal conditions   []Disc pathology   []Congenital spine pathologies   []Prior surgical intervention  []Osteoporosis (M81.8)  []Osteopenia (M85.8)   Endocrine conditions   []Hypothyroid (E03.9)  []Hyperthyroid Gastrointestinal conditions   []Constipation (F31.37)   Metabolic conditions   []Morbid obesity (E66.01)  []Diabetes type 1(E10.65) or 2 (E11.65)   []Neuropathy (G60.9)     Pulmonary conditions   []Asthma (J45)  []Coughing   []COPD (J44.9)   Psychological Disorders  []Anxiety (F41.9)  []Depression (F32.9)   []Other:   []Other:          Barriers to/and or personal factors that will affect rehab potential:              [x]Age  [x]Sex              [x]Motivation/Lack of Motivation                        []Co-Morbidities              []Cognitive Function, education/learning barriers              []Environmental, home barriers              []profession/work barriers  []past PT/medical experience  []other:  Justification:     Falls Risk Assessment (30 days):   [x] Falls Risk assessed and no intervention required.   [] Falls Risk assessed and Patient requires intervention due to being higher risk   TUG score (>12s at risk):     [] Falls education provided, including              ASSESSMENT:   Functional Impairments:     []Noted lumbar/proximal hip/LE hypomobility   [x]Decreased LE functional ROM   []Decreased core/proximal hip strength and neuromuscular control   [x]Decreased LE functional strength   []Reduced balance/proprioceptive control   []other:      Functional Activity Limitations (from functional questionnaire and intake)   [x]Reduced ability to tolerate prolonged functional positions   [x]Reduced ability or difficulty with changes of positions or transfers between positions   [x]Reduced ability to maintain good posture and demonstrate good body mechanics with sitting, bending, and lifting   [x]Reduced ability to sleep   [x] Reduced ability or tolerance with driving and/or computer work   [x]Reduced ability to perform lifting, carrying tasks   [x]Reduced ability to squat   [x]Reduced ability to forward bend   [x]Reduced ability to ambulate prolonged functional periods/distances/surfaces   [x]Reduced ability to ascend/descend stairs   [x]Reduced ability to run, hop or jump   []other:     Participation Restrictions   [x]Reduced participation in self care activities   [x]Reduced participation in home management activities   [x]Reduced participation in work activities   [x]Reduced participation in social activities. [x]Reduced participation in sport activities. Classification :    [x]Signs/symptoms consistent with post-surgical status including decreased ROM, strength and function.    []Signs/symptoms consistent with joint sprain/strain   []Signs/symptoms consistent with patella-femoral syndrome   []Signs/symptoms consistent with knee OA/hip OA   []Signs/symptoms consistent with internal derangement of knee/Hip   []Signs/symptoms consistent with functional hip weakness/NMR control      []Signs/symptoms consistent with tendinitis/tendinosis    []signs/symptoms consistent with pathology which may benefit from Dry needling      []other: Prognosis/Rehab Potential:      []Excellent   [x]Good    []Fair   []Poor    Tolerance of evaluation/treatment:    []Excellent   [x]Good    []Fair   []Poor    Physical Therapy Evaluation Complexity Justification  [x] A history of present problem with:  [] no personal factors and/or comorbidities that impact the plan of care;  [x]1-2 personal factors and/or comorbidities that impact the plan of care  []3 personal factors and/or comorbidities that impact the plan of care  [x] An examination of body systems using standardized tests and measures addressing any of the following: body structures and functions (impairments), activity limitations, and/or participation restrictions;:  [x] a total of 1-2 or more elements   [] a total of 3 or more elements   [] a total of 4 or more elements   [x] A clinical presentation with:  [x] stable and/or uncomplicated characteristics   [] evolving clinical presentation with changing characteristics  [] unstable and unpredictable characteristics;   [x] Clinical decision making of [x] low, [] moderate, [] high complexity using standardized patient assessment instrument and/or measurable assessment of functional outcome. [x] EVAL (LOW) 27053 (typically 20 minutes face-to-face)  [] EVAL (MOD) 85816 (typically 30 minutes face-to-face)  [] EVAL (HIGH) 75967 (typically 45 minutes face-to-face)  [] RE-EVAL       PLAN  Frequency/Duration:  1-2 days per week for 12 Weeks:  Interventions:  [x]  Therapeutic exercise including: strength training, ROM, for Lower extremity and core   [x]  NMR activation and proprioception for LE, Glutes and Core   [x]  Manual therapy as indicated for LE, Hip and spine to include: Dry Needling/IASTM, STM, PROM, Gr I-IV mobilizations, manipulation.    [x] Modalities as needed that may include: thermal agents, E-stim, Biofeedback, US, iontophoresis as indicated  [x] Patient education on joint protection, postural re-education, activity modification, progression of Progressing: [] Met: [] Not Met: [] Adjusted  4. Patient will return to  functional activities normal heel to toe gait in community, ascend and descend 12 steps alternating without increased symptoms or restriction. [] Progressing: [] Met: [] Not Met: [] Adjusted        Electronically signed by:  Jackie Kapadia PT    Note: If patient does not return for scheduled/ recommended follow up visits, this note will serve as a discharge from care along with most recent update on progress.

## 2022-09-09 NOTE — PROGRESS NOTES
Chief Complaint  Follow-up (Left knee. S/p 10 days scope )      History of Present Illness:  Kristina Milner is a pleasant 30 y.o. male here for follow-up regarding his left knee. He is 10 days status post left knee arthroscopy with chondroplasty and synovectomy open patellar cartilage transplant. Doing well with no concerns. Advancing his therapy. Brace is locked in extension and he is using his crutches still. Medical History:  Patient's medications, allergies, past medical, surgical, social and family histories were reviewed and updated as appropriate. Pain Assessment  Location of Pain: Knee  Location Modifiers: Left  Severity of Pain: 7  Quality of Pain: Sharp, Dull  Duration of Pain: Persistent  Frequency of Pain: Intermittent  Aggravating Factors:  (lifting leg)  Limiting Behavior: Yes  Relieving Factors: Rest, Ice  Result of Injury: No  Work-Related Injury: No  Are there other pain locations you wish to document?: No  ROS: Review of systems reviewed from Patient History Form completed today and available in the patient's chart under the Media tab. Pertinent items are noted in HPI  Review of systems reviewed from Patient History Form completed today and available in the patient's chart under the Media tab. Vital Signs:  Ht 6' 2\" (1.88 m)   Wt 215 lb (97.5 kg)   BMI 27.60 kg/m²       Left knee examination:    Gait: No use of assistive devices. No antalgic gait. Alignment: normal alignment. Inspection/skin: Healing incision    Palpation: mild crepitus. no joint line tenderness present. Range of Motion: 0-90    Strength: Normal quadriceps development. Effusion: Moderate effusion    Ligamentous stability: No cruciate or collateral ligament instability. Neurologic and vascular: Skin is warm and well-perfused. Sensation is intact to light-touch. Special tests: Negative Eh sign.          Radiology:       Pertinent imaging was interpreted and reviewed with the patient today, images only - no report available. No new imaging was obtained during today's visit. Assessment : 79-year-old male 10 days status post left knee arthroscopy with chondroplasty and synovectomy open patellar cartilage transfer    Impression:  Encounter Diagnosis   Name Primary? S/P left knee arthroscopy Yes       Office Procedures:  No orders of the defined types were placed in this encounter. Plan: Pertinent imaging was reviewed. The etiology, natural history, and treatment options for the disorder were discussed. The roles of activity medication, antiinflammatories, injections, bracing, physical therapy, and surgical interventions were all described to the patient and questions were answered. Patient doing very well following his open patellar cartilage patch. He is able to flex to 90. Precautions and therapy will be to limit his flexion to 90 and continue with his brace locked in extension using a crutch for ambulation. Patient understands and would like to proceed with this. .     Follow-up in 1 month or sooner if worsening symptoms  Ang Moreau is in agreement with this plan. All questions were answered to patient's satisfaction and was encouraged to call with any further questions. Whitney Dimas, 126[facility]  9/9/2022    This dictation was performed with a verbal recognition program [facility]) and it was checked for errors. It is possible that there are still dictated errors within this office note. If so, please bring any areas to my attention for an addendum. All efforts were made to ensure that this office note is accurate.

## 2022-09-19 ENCOUNTER — HOSPITAL ENCOUNTER (OUTPATIENT)
Dept: PHYSICAL THERAPY | Age: 37
Setting detail: THERAPIES SERIES
Discharge: HOME | End: 2022-09-19
Payer: COMMERCIAL

## 2022-09-19 PROCEDURE — 97110 THERAPEUTIC EXERCISES: CPT | Performed by: PHYSICAL THERAPIST

## 2022-09-19 PROCEDURE — 97016 VASOPNEUMATIC DEVICE THERAPY: CPT | Performed by: PHYSICAL THERAPIST

## 2022-09-19 PROCEDURE — 97112 NEUROMUSCULAR REEDUCATION: CPT | Performed by: PHYSICAL THERAPIST

## 2022-09-19 NOTE — FLOWSHEET NOTE
The [address] [place], [place] [address], [address], [address] [place] [address]  Phone: [phone]   Fax:     [phone]    Physical Therapy Daily Treatment Note  Date:  2022    Patient Name:  Melly Sims    :  1985  MRN: [id number]  Restrictions/Precautions:    Medical/Treatment Diagnosis Information:  Diagnosis: 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 SURGEON:  Milo Flores MD     ASSISTANT:  Yola Nelson MD     ANESTHESIA:  General with adductor canal block. PREOPERATIVE DIAGNOSIS:  Focal cartilage defect, medial facet patella. POSTOPERATIVE DIAGNOSES:  Focal cartilage defect, medial facet patella  with synovitis. Insurance/Certification information:  PT Insurance Information: [employer] auth needed  Physician Information:   Milo Flores  Has the plan of care been signed (Y/N):        []  Yes  [x]  No     Date of Patient follow up with Physician: per md      Is this a Progress Report:     []  Yes  [x]  No        If Yes:  Date Range for reporting period:  Beginning  Ending    Progress report will be due (10 Rx or 30 days whichever is less):       Recertification will be due (POC Duration  / 90 days whichever is less):          Visit # Insurance Allowable Auth Required   2 60 per yr   [x]  Yes COHERE []  No        Functional Scale: Foto 21/100    Date assessed:         Latex Allergy:  [x]NO      []YES  Preferred Language for Healthcare:   [x]English       []other:    Pain level:  2-10     SUBJECTIVE:  Reports that his knee is doing well. Reports that his pain is well managed. He is no longer taking pain medications.   He is taking aspirin as prescribed by MD.  He notes that he is WBAT with his brace locked at 0 related to improving balance, coordination, kinesthetic sense, posture, motor skill, proprioception of core, proximal hip and LE for self care, mobility, lifting, and ambulation/stair navigation      Manual Treatments:  PROM / STM / Oscillations-Mobs:  G-I, II, III, IV (PA's, Inf., Post.)  [] (74242) Provided manual therapy to mobilize LE, proximal hip and/or LS spine soft tissue/joints for the purpose of modulating pain, promoting relaxation,  increasing ROM, reducing/eliminating soft tissue swelling/inflammation/restriction, improving soft tissue extensibility and allowing for proper ROM for normal function with self care, mobility, lifting and ambulation. Modalities:  vaso 15    Charges:  Timed Code Treatment Minutes: 40'   Total Treatment Minutes: 2:35-3:50  76'       [] EVAL (LOW) 60991 (typically 20 minutes face-to-face)  [] EVAL (MOD) 38273 (typically 30 minutes face-to-face)  [] EVAL (HIGH) 74790 (typically 45 minutes face-to-face)  [] RE-EVAL   [x] DJ(85617) x 2    [] IONTO  [x] NMR (00178) x  1   [x] VASO  [] Manual (07235) x      [] Other:  [] TA x      [] Mech Traction (06263)  [] ES(attended) (28094)      [] ES (un) (36530):     GOALS:   Patient stated goal: return to light light sports    [] Progressing: [] Met: [] Not Met: [] Adjusted     Therapist goals for Patient:   Short Term Goals: To be achieved in: 2 -4weeks  1. Independent in HEP and progression per patient tolerance, in order to prevent re-injury. [] Progressing: [] Met: [] Not Met: [] Adjusted   2. Patient will have a decrease in pain to facilitate improvement in movement, function, and ADLs as indicated by Functional Deficits. [] Progressing: [] Met: [] Not Met: [] Adjusted     Long Term Goals: To be achieved in: 12 weeks  1. Foto 62/100 to assist with reaching prior level of function. [] Progressing: [] Met: [] Not Met: [] Adjusted  2.  Patient will demonstrate increased AROM to = R to allow for proper joint functioning as indicated by patients Functional Deficits. [] Progressing: [] Met: [] Not Met: [] Adjusted  3. Patient will demonstrate an increase in Strength to good proximal hip strength and control  in LE to allow for proper functional mobility as indicated by patients Functional Deficits. [] Progressing: [] Met: [] Not Met: [] Adjusted  4. Patient will return to  functional activities normal heel to toe gait in community, ascend and descend 12 steps alternating without increased symptoms or restriction. [] Progressing: [] Met: [] Not Met: [] Adjusted  Overall Progression Towards Functional goals/ Treatment Progress Update:  [] Patient is progressing as expected towards functional goals listed. [] Progression is slowed due to complexities/Impairments listed. [] Progression has been slowed due to co-morbidities. [x] Plan just implemented, too soon to assess goals progression <30days   [] Goals require adjustment due to lack of progress  [] Patient is not progressing as expected and requires additional follow up with physician  [] Other    Prognosis for POC: [x] Good [] Fair  [] Poor      Patient requires continued skilled intervention: [x] Yes  [] No    Treatment/Activity Tolerance:  [x] Patient able to complete treatment  [] Patient limited by fatigue  [x] Patient limited by pain     [] Patient limited by other medical complications  [x] Other: No complaints with today's program.  ROM is progressing very well and he is able to reach 90 degrees today easily. Moderate edema is still present. Pt is wearing compression stocking. Quad control is progressing well. Pt was advised that he should be using bilateral crutches for ambulation. Progressed strengthening program today without problem. Patient Education:  Reviewed diagnosis, POC, HEP and its importance. Access Code: [email or web address]  URL: [email or web address]  Date: 09/09/2022  Prepared by: Jerome Mabry     Exercises  Long Sitting Calf Stretch with Strap - 2-3 x daily - 7 x weekly - 5 reps - 30 hold  Seated Table Hamstring Stretch - 2-3 x daily - 7 x weekly - 5 reps - 30 hold  Long Sitting Quad Set - 2-3 x daily - 7 x weekly - 10 reps - 10 hold  Supine Straight Leg Hip Adduction and Quad Set with Ball - 2-3 x daily - 7 x weekly - 10 reps - 10 hold  Sidelying Hip Abduction - 2-3 x daily - 7 x weekly - 3 sets - 10 reps  Prone Hip Extension on Table - 2-3 x daily - 7 x weekly - 3 sets - 10 reps - 1 hold  Supine Active Straight Leg Raise - 2-3 x daily - 7 x weekly - 3 sets - 10 reps - 1 hold  Standing Hip Flexion AROM - 2-3 x daily - 7 x weekly - 3 sets - 10 reps  Sitting Heel Slide with Towel - 2-3 x daily - 7 x weekly - 10 reps - 10 hold      PLAN:   [x] Continue per plan of care [] Alter current plan (see comments above)  [] Plan of care initiated [] Hold pending MD visit [] Discharge    Progress per surgical restrictions      Electronically signed by:  Cheyanne Brown PT    Note: If patient does not return for scheduled/ recommended follow up visits, this note will serve as a discharge from care along with most recent update on progress.

## 2022-09-26 ENCOUNTER — OFFICE VISIT (OUTPATIENT)
Dept: ORTHOPEDIC SURGERY | Age: 37
End: 2022-09-26

## 2022-09-26 VITALS — WEIGHT: 215 LBS | BODY MASS INDEX: 27.59 KG/M2 | HEIGHT: 74 IN

## 2022-09-26 DIAGNOSIS — Z98.890 S/P LEFT KNEE ARTHROSCOPY: Primary | ICD-10-CM

## 2022-09-26 PROCEDURE — 99024 POSTOP FOLLOW-UP VISIT: CPT | Performed by: ORTHOPAEDIC SURGERY

## 2022-09-26 NOTE — PROGRESS NOTES
Chief Complaint  Follow-up (S/p 1 month left knee)      History of Present Illness:  Diana Mcelroy is a pleasant 30 y.o. male for follow-up regarding his left knee. He is 1 month status post left knee arthroscopy with chondroplasty and synovectomy, open patellar cartilage transplant. Progressing through therapy. He is still using his brace locked in extension and crutches. Medical History:  Patient's medications, allergies, past medical, surgical, social and family histories were reviewed and updated as appropriate. ROS: Review of systems reviewed from Patient History Form completed today and available in the patient's chart under the Media tab. Pertinent items are noted in HPI  Review of systems reviewed from Patient History Form completed today and available in the patient's chart under the Media tab. Vital Signs: There were no vitals taken for this visit. Left knee examination:     Gait: No use of assistive devices. No antalgic gait. Alignment: normal alignment. Inspection/skin: Healing incision     Palpation: mild crepitus. no joint line tenderness present. Range of Motion: 0-90     Strength: Normal quadriceps development. Effusion: Moderate effusion     Ligamentous stability: No cruciate or collateral ligament instability. Neurologic and vascular: Skin is warm and well-perfused. Sensation is intact to light-touch. Special tests: Negative Eh sign. Radiology:       Pertinent imaging was interpreted and reviewed with the patient today, images only - no report available. No new imaging was obtained during today's visit. Assessment : 44-year-old male 1 month status post left knee arthroscopy with chondroplasty synovectomy open patellar cartilage transfer    Impression:  Encounter Diagnosis   Name Primary? S/P left knee arthroscopy Yes       Office Procedures:  No orders of the defined types were placed in this encounter.         Plan: Pertinent imaging was reviewed. The etiology, natural history, and treatment options for the disorder were discussed. The roles of activity medication, antiinflammatories, injections, bracing, physical therapy, and surgical interventions were all described to the patient and questions were answered. Malena Paul is doing very well today. I would like him to continue working with physical therapy. He can unlock his brace and ambulate with crutches. Patient understands would like to proceed with this. We will see him back in 1 month or sooner if worsening symptoms. Rossy Pate is in agreement with this plan. All questions were answered to patient's satisfaction and was encouraged to call with any further questions. Rubin Goldmann, [address] [facility]  9/26/2022    During this exam, I, Rubin Goldmann, PA-C, functioned as a scribe for Dr. Karen Carbone. The history taking and physical examination were performed by Dr. Karen Carbone. All counseling during the appointment was performed between the patient and Dr. Karen Carbone. 9/26/2022 9:20 AM    This dictation was performed with a verbal recognition program (DRAGON) and it was checked for errors. It is possible that there are still dictated errors within this office note. If so, please bring any areas to my attention for an addendum. All efforts were made to ensure that this office note is accurate. I attest that I met personally with the patient, performed the described exam, reviewed the radiographic studies and medical records associated with this patient and supervised the services that are described above.      Diamond Pedersen MD

## 2022-09-28 ENCOUNTER — HOSPITAL ENCOUNTER (OUTPATIENT)
Dept: PHYSICAL THERAPY | Age: 37
Setting detail: THERAPIES SERIES
Discharge: HOME | End: 2022-09-28
Payer: COMMERCIAL

## 2022-09-28 PROCEDURE — 97016 VASOPNEUMATIC DEVICE THERAPY: CPT | Performed by: PHYSICAL THERAPY ASSISTANT

## 2022-09-28 PROCEDURE — 97112 NEUROMUSCULAR REEDUCATION: CPT | Performed by: PHYSICAL THERAPY ASSISTANT

## 2022-09-28 PROCEDURE — 97530 THERAPEUTIC ACTIVITIES: CPT | Performed by: PHYSICAL THERAPY ASSISTANT

## 2022-09-28 PROCEDURE — 97110 THERAPEUTIC EXERCISES: CPT | Performed by: PHYSICAL THERAPY ASSISTANT

## 2022-09-28 NOTE — FLOWSHEET NOTE
[address], [address] [place] [address] [address] [facility], [address] [place] [address]  Phone: [phone]   Fax:     [phone]    Physical Therapy Daily Treatment Note  Date:  2022    Patient Name:  Katharina Delcid    :  1985  MRN: [id number]  Restrictions/Precautions:    Medical/Treatment Diagnosis Information:  Diagnosis: 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 SURGEON:  Charlotte Rodriguez MD     ASSISTANT:  Dasha Lala MD     ANESTHESIA:  General with adductor canal block. PREOPERATIVE DIAGNOSIS:  Focal cartilage defect, medial facet patella. POSTOPERATIVE DIAGNOSES:  Focal cartilage defect, medial facet patella  with synovitis. Insurance/Certification information:  PT Insurance Information: [employer] auth needed  Physician Information:   Charlotte Rodriguez  Has the plan of care been signed (Y/N):        []  Yes  [x]  No     Date of Patient follow up with Physician: per md      Is this a Progress Report:     []  Yes  [x]  No        If Yes:  Date Range for reporting period:  Beginning  Ending    Progress report will be due (10 Rx or 30 days whichever is less):       Recertification will be due (POC Duration  / 90 days whichever is less):          Visit # Insurance Allowable Auth Required   3 60 per yr   [x]  Yes COHERE []  No        Functional Scale: Foto 21/100    Date assessed:         Latex Allergy:  [x]NO      []YES  Preferred Language for Healthcare:   [x]English       []other:    Pain level:  6/10 intermittent with quad sets     SUBJECTIVE:  Overall knee feels good. Mild increase in soreness over weekend after being on feet a little more.  Saw Dr. Judy Kaufman Monday- knee looks good, work with PT for strength progression, ok to unlock brace, ok to d/c ARIEL. He is taking aspirin as prescribed by MD. He notes that he has been ambulating with 1 crutch around the house. OBJECTIVE:     Flexibility L R Comment   Hamstring + mild      Gastroc  + mild       ITB         Quad                      9/28          ROM PROM AROM Overpressure Comment     L R L R L R     Flexion 105               Extension 0                                                         Strength L R Comment   Quad fair       Hamstring         Gastroc         Hip  flexion         Hip abd                                   Special Test Results/Comment   Meniscal Click     Crepitus     Flexion Test     Valgus Laxity     Varus Laxity     Lachmans     Drop Back     Homans neg               Girth L R   Mid Patella Moderate effusion     Suprapatellar moderate effucion     5cm above       15cm above Atrophy compared to R        Reflexes/Sensation:               [x]Dermatomes/Myotomes intact               []Reflexes equal and normal bilaterally              []Other:     Joint mobility: not assessed               []Normal                       []Hypo              []Hyper     Palpation: general TTP due to recent surgery      Functional Mobility/Transfers: modified I     Posture: WFL for a 30 yr old male      Bandages/Dressings/Incisions: CDI     Gait: >50% PWB, TROM unlocked 0-70° increase to FWB as quad improves     Orthopedic Special Tests:     RESTRICTIONS/PRECAUTIONS: PF cartilage transplant, progress to full WB with TROM locked as quad recruitment improves. TROM locked for gait 0-4 weeks. ROM 0-90  0-4 weeks then gradually increase to full by 8 weeks.       Exercises/Interventions:   Exercise/Equipment Resistance/Repetitions Other comments   Stretching     Hamstring [id number]    Towel Pull [id number]    Inclined Calf     Hip Flexion     ITB     Groin     Quad                    SLR     Supine 3x10, 2#    Stand 2x10    Abduction 3x10, 2#    Adduction     EOB-hip ext 3x10, 2# Clamshells SL  Blue loop 3x10    SLR+ 5x15\"         Isometrics     Quad sets [id number]    Adduc set [id number]    Patellar Glides     Medial     Superior     Inferior          ROM     Sheet Pulls [id number]    Hang Weights     Passive     Active     Weight Shift     Ankle Pumps                    CKC     Calf raises   3x10 standing   Wall sits     Step ups     1 leg stand     Squatting     CC TKE     Balance     bridges          PRE     Extension  RANGE:   Flexion  RANGE:        Quantum machines     Leg press      Leg extension     Leg curl          Manual interventions          Gait training 5' With brace unlocked         Therapeutic Exercise and NMR EXR  [x] (20276) Provided verbal/tactile cueing for activities related to strengthening, flexibility, endurance, ROM for improvements in LE, proximal hip, and core control with self care, mobility, lifting, ambulation. [x] (93930) Provided verbal/tactile cueing for activities related to improving balance, coordination, kinesthetic sense, posture, motor skill, proprioception  to assist with LE, proximal hip, and core control in self care, mobility, lifting, ambulation and eccentric single leg control.      NMR and Therapeutic Activities:    [] (55374 or 72193) Provided verbal/tactile cueing for activities related to improving balance, coordination, kinesthetic sense, posture, motor skill, proprioception and motor activation to allow for proper function of core, proximal hip and LE with self care and ADLs  [] (45330) Gait Re-education- Provided training and instruction to the patient for proper LE, core and proximal hip recruitment and positioning and eccentric body weight control with ambulation re-education including up and down stairs     Home Exercise Program:    [x] (45453) Reviewed/Progressed HEP activities related to strengthening, flexibility, endurance, ROM of core, proximal hip and LE for functional self-care, mobility, lifting and ambulation/stair navigation   [] (90916)Reviewed/Progressed HEP activities related to improving balance, coordination, kinesthetic sense, posture, motor skill, proprioception of core, proximal hip and LE for self care, mobility, lifting, and ambulation/stair navigation      Manual Treatments:  PROM / STM / Oscillations-Mobs:  G-I, II, III, IV (PA's, Inf., Post.)  [] (10319) Provided manual therapy to mobilize LE, proximal hip and/or LS spine soft tissue/joints for the purpose of modulating pain, promoting relaxation,  increasing ROM, reducing/eliminating soft tissue swelling/inflammation/restriction, improving soft tissue extensibility and allowing for proper ROM for normal function with self care, mobility, lifting and ambulation. Modalities:  vaso 15    Charges:  Timed Code Treatment Minutes: 48'   Total Treatment Minutes: 12:45-2:00  76'       [] EVAL (LOW) 58051 (typically 20 minutes face-to-face)  [] EVAL (MOD) 04377 (typically 30 minutes face-to-face)  [] EVAL (HIGH) 67091 (typically 45 minutes face-to-face)  [] RE-EVAL   [x] PK(70231) x 1    [] IONTO  [x] NMR (55507) x  1   [x] VASO  [] Manual (51459) x      [] Other:  [x] TA x  1    [] Mech Traction (99592)  [] ES(attended) (79376)      [] ES (un) (59104):     GOALS:   Patient stated goal: return to light light sports    [] Progressing: [] Met: [] Not Met: [] Adjusted     Therapist goals for Patient:   Short Term Goals: To be achieved in: 2 -4weeks  1. Independent in HEP and progression per patient tolerance, in order to prevent re-injury. [] Progressing: [] Met: [] Not Met: [] Adjusted   2. Patient will have a decrease in pain to facilitate improvement in movement, function, and ADLs as indicated by Functional Deficits. [] Progressing: [] Met: [] Not Met: [] Adjusted     Long Term Goals: To be achieved in: 12 weeks  1. Foto 62/100 to assist with reaching prior level of function. [] Progressing: [] Met: [] Not Met: [] Adjusted  2.  Patient will demonstrate increased AROM to = R to allow for proper joint functioning as indicated by patients Functional Deficits. [] Progressing: [] Met: [] Not Met: [] Adjusted  3. Patient will demonstrate an increase in Strength to good proximal hip strength and control  in LE to allow for proper functional mobility as indicated by patients Functional Deficits. [] Progressing: [] Met: [] Not Met: [] Adjusted  4. Patient will return to  functional activities normal heel to toe gait in community, ascend and descend 12 steps alternating without increased symptoms or restriction. [] Progressing: [] Met: [] Not Met: [] Adjusted  Overall Progression Towards Functional goals/ Treatment Progress Update:  [] Patient is progressing as expected towards functional goals listed. [] Progression is slowed due to complexities/Impairments listed. [] Progression has been slowed due to co-morbidities. [x] Plan just implemented, too soon to assess goals progression <30days   [] Goals require adjustment due to lack of progress  [] Patient is not progressing as expected and requires additional follow up with physician  [] Other    Prognosis for POC: [x] Good [] Fair  [] Poor      Patient requires continued skilled intervention: [x] Yes  [] No    Treatment/Activity Tolerance:  [x] Patient able to complete treatment  [] Patient limited by fatigue  [] Patient limited by pain     [] Patient limited by other medical complications  [x] Other: No complaints with today's program. Moderate edema is still present. Pt is wearing compression stocking. Pt was advised to use bilateral crutches for ambulation while transitioning to unlocked brace. Able to add weight to SLR with good tolerance. Patient Education:  Reviewed diagnosis, POC, HEP and its importance. Access Code: [email or web address]  URL: [email or web address]  Date: 09/09/2022  Prepared by: Tayler Sood     Exercises  Long Sitting Calf Stretch with Strap - 2-3 x daily - 7 x weekly - 5 reps - 30 hold  Seated Table Hamstring Stretch - 2-3 x daily - 7 x weekly - 5 reps - 30 hold  Long Sitting Quad Set - 2-3 x daily - 7 x weekly - 10 reps - 10 hold  Supine Straight Leg Hip Adduction and Quad Set with Ball - 2-3 x daily - 7 x weekly - 10 reps - 10 hold  Sidelying Hip Abduction - 2-3 x daily - 7 x weekly - 3 sets - 10 reps  Prone Hip Extension on Table - 2-3 x daily - 7 x weekly - 3 sets - 10 reps - 1 hold  Supine Active Straight Leg Raise - 2-3 x daily - 7 x weekly - 3 sets - 10 reps - 1 hold  Standing Hip Flexion AROM - 2-3 x daily - 7 x weekly - 3 sets - 10 reps  Sitting Heel Slide with Towel - 2-3 x daily - 7 x weekly - 10 reps - 10 hold      PLAN:   [x] Continue per plan of care [] Alter current plan (see comments above)  [] Plan of care initiated [] Hold pending MD visit [] Discharge    Progress per surgical restrictions  Open brace to 90° as francia    Electronically signed by:  Shamar Arreola PTA    Note: If patient does not return for scheduled/ recommended follow up visits, this note will serve as a discharge from care along with most recent update on progress.

## 2022-10-03 ENCOUNTER — HOSPITAL ENCOUNTER (OUTPATIENT)
Dept: PHYSICAL THERAPY | Age: 37
Setting detail: THERAPIES SERIES
Discharge: HOME | End: 2022-10-03
Payer: COMMERCIAL

## 2022-10-03 PROCEDURE — 97016 VASOPNEUMATIC DEVICE THERAPY: CPT | Performed by: PHYSICAL THERAPIST

## 2022-10-03 PROCEDURE — 97112 NEUROMUSCULAR REEDUCATION: CPT | Performed by: PHYSICAL THERAPIST

## 2022-10-03 PROCEDURE — 97110 THERAPEUTIC EXERCISES: CPT | Performed by: PHYSICAL THERAPIST

## 2022-10-03 PROCEDURE — 97530 THERAPEUTIC ACTIVITIES: CPT | Performed by: PHYSICAL THERAPIST

## 2022-10-03 NOTE — FLOWSHEET NOTE
The [facility] and [facility], [address] [place] [address]  Phone: [phone]   Fax:     [phone]    Physical Therapy Daily Treatment Note  Date:  10/3/2022    Patient Name:  Marleny Rayo    :  1985  MRN: [id number]  Restrictions/Precautions:    Medical/Treatment Diagnosis Information:  Diagnosis: 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 SURGEON:  Mike Joyce MD     ASSISTANT:  Chapo Varela MD     ANESTHESIA:  General with adductor canal block. PREOPERATIVE DIAGNOSIS:  Focal cartilage defect, medial facet patella. POSTOPERATIVE DIAGNOSES:  Focal cartilage defect, medial facet patella  with synovitis. Insurance/Certification information:  PT Insurance Information: [employer] auth needed  Physician Information:   Mike Joyce  Has the plan of care been signed (Y/N):        []  Yes  [x]  No     Date of Patient follow up with Physician: per md      Is this a Progress Report:     []  Yes  [x]  No        If Yes:  Date Range for reporting period:  Beginning  Ending    Progress report will be due (10 Rx or 30 days whichever is less):       Recertification will be due (POC Duration  / 90 days whichever is less):          Visit # Insurance Allowable Auth Required   4 60 per yr   [x]  Yes COHERE []  No        Functional Scale: Foto /    Date assessed:         Latex Allergy:  [x]NO      []YES  Preferred Language for Healthcare:   [x]English       []other:    Pain level:  3/10      SUBJECTIVE:  Reports that his knee is doing well overall. He is compliant with brace wear unlocked to 70 degrees. He notes that he is using 1 crutch around the house and 2 crutches when in public.   Notes that pain is pretty well managed. He notes that he does still experience a fair amount of swelling. He is wearing his compression stocking when on his feet for extended periods of time. OBJECTIVE:     Flexibility L R Comment   Hamstring + mild      Gastroc  + mild       ITB         Quad                      9/28          ROM PROM AROM Overpressure Comment     L R L R L R     Flexion 105               Extension 0                                                         Strength L R Comment   Quad fair       Hamstring         Gastroc         Hip  flexion         Hip abd                                   Special Test Results/Comment   Meniscal Click     Crepitus     Flexion Test     Valgus Laxity     Varus Laxity     Lachmans     Drop Back     Homans neg               Girth L R   Mid Patella Moderate effusion     Suprapatellar moderate effucion     5cm above       15cm above Atrophy compared to R        Reflexes/Sensation:               [x]Dermatomes/Myotomes intact               []Reflexes equal and normal bilaterally              []Other:     Joint mobility: not assessed               []Normal                       []Hypo              []Hyper     Palpation: general TTP due to recent surgery      Functional Mobility/Transfers: modified I     Posture: WFL for a 30 yr old male      Bandages/Dressings/Incisions: CDI     Gait: >50% PWB, TROM unlocked 0-70° increase to FWB as quad improves     Orthopedic Special Tests:     RESTRICTIONS/PRECAUTIONS: PF cartilage transplant, progress to full WB with TROM locked as quad recruitment improves. TROM locked for gait 0-4 weeks. ROM 0-90  0-4 weeks then gradually increase to full by 8 weeks.       Exercises/Interventions:   Exercise/Equipment Resistance/Repetitions Other comments   Stretching     Hamstring [id number]       Inclined Calf 5x30\"    Hip Flexion     ITB     Groin     Quad                    SLR     Supine 3x10, 3#    Stand 2x10    Abduction 3x10, 4#    Adduction     EOB-hip ext 3x10, 4# Clamshells SL  Blue loop 3x10    SLR+ 5x15\"         Isometrics     Quad sets [id number]    Adduc set [id number]    Patellar Glides     Medial     Superior     Inferior          ROM     Sheet Pulls 10x10\"    Hang Weights     Passive     Active     Weight Shift     Ankle Pumps                    CKC     Calf raises   3x10 standing   Wall Sofie@[employer] 5x20\"    Step ups     1 leg stand     Squatting     CC TKE     Balance PS L7 3'    bridges Blue loop 3x10         PRE     Extension  RANGE:   Flexion  RANGE:        Quantum machines     Leg press      Leg extension     Leg curl          Manual interventions          Gait training 5' With brace unlocked         Therapeutic Exercise and NMR EXR  [x] (43160) Provided verbal/tactile cueing for activities related to strengthening, flexibility, endurance, ROM for improvements in LE, proximal hip, and core control with self care, mobility, lifting, ambulation. [x] (17723) Provided verbal/tactile cueing for activities related to improving balance, coordination, kinesthetic sense, posture, motor skill, proprioception  to assist with LE, proximal hip, and core control in self care, mobility, lifting, ambulation and eccentric single leg control.      NMR and Therapeutic Activities:    [] (51194 or 61930) Provided verbal/tactile cueing for activities related to improving balance, coordination, kinesthetic sense, posture, motor skill, proprioception and motor activation to allow for proper function of core, proximal hip and LE with self care and ADLs  [] (11491) Gait Re-education- Provided training and instruction to the patient for proper LE, core and proximal hip recruitment and positioning and eccentric body weight control with ambulation re-education including up and down stairs     Home Exercise Program:    [x] (42202) Reviewed/Progressed HEP activities related to strengthening, flexibility, endurance, ROM of core, proximal hip and LE for functional self-care, mobility, lifting and ambulation/stair navigation   [] (89160)Reviewed/Progressed HEP activities related to improving balance, coordination, kinesthetic sense, posture, motor skill, proprioception of core, proximal hip and LE for self care, mobility, lifting, and ambulation/stair navigation      Manual Treatments:  PROM / STM / Oscillations-Mobs:  G-I, II, III, IV (PA's, Inf., Post.)  [] (03750) Provided manual therapy to mobilize LE, proximal hip and/or LS spine soft tissue/joints for the purpose of modulating pain, promoting relaxation,  increasing ROM, reducing/eliminating soft tissue swelling/inflammation/restriction, improving soft tissue extensibility and allowing for proper ROM for normal function with self care, mobility, lifting and ambulation. Modalities:  vaso 15    Charges:  Timed Code Treatment Minutes: 54'   Total Treatment Minutes: 8:33-9:50  68'       [] EVAL (LOW) 86409 (typically 20 minutes face-to-face)  [] EVAL (MOD) 01971 (typically 30 minutes face-to-face)  [] EVAL (HIGH) 78726 (typically 45 minutes face-to-face)  [] RE-EVAL   [x] MO(37030) x 1    [] IONTO  [x] NMR (56587) x  1   [x] VASO  [] Manual (89813) x      [] Other:  [x] TA x  1    [] Mech Traction (73877)  [] ES(attended) (97307)      [] ES (un) (32476):     GOALS:   Patient stated goal: return to light light sports    [] Progressing: [] Met: [] Not Met: [] Adjusted     Therapist goals for Patient:   Short Term Goals: To be achieved in: 2 -4weeks  1. Independent in HEP and progression per patient tolerance, in order to prevent re-injury. [] Progressing: [] Met: [] Not Met: [] Adjusted   2. Patient will have a decrease in pain to facilitate improvement in movement, function, and ADLs as indicated by Functional Deficits. [] Progressing: [] Met: [] Not Met: [] Adjusted     Long Term Goals: To be achieved in: 12 weeks  1. Foto 62/100 to assist with reaching prior level of function. [] Progressing: [] Met: [] Not Met: [] Adjusted  2.  Patient will demonstrate increased AROM to = R to allow for proper joint functioning as indicated by patients Functional Deficits. [] Progressing: [] Met: [] Not Met: [] Adjusted  3. Patient will demonstrate an increase in Strength to good proximal hip strength and control  in LE to allow for proper functional mobility as indicated by patients Functional Deficits. [] Progressing: [] Met: [] Not Met: [] Adjusted  4. Patient will return to  functional activities normal heel to toe gait in community, ascend and descend 12 steps alternating without increased symptoms or restriction. [] Progressing: [] Met: [] Not Met: [] Adjusted  Overall Progression Towards Functional goals/ Treatment Progress Update:  [] Patient is progressing as expected towards functional goals listed. [] Progression is slowed due to complexities/Impairments listed. [] Progression has been slowed due to co-morbidities. [x] Plan just implemented, too soon to assess goals progression <30days   [] Goals require adjustment due to lack of progress  [] Patient is not progressing as expected and requires additional follow up with physician  [] Other    Prognosis for POC: [x] Good [] Fair  [] Poor      Patient requires continued skilled intervention: [x] Yes  [] No    Treatment/Activity Tolerance:  [x] Patient able to complete treatment  [] Patient limited by fatigue  [] Patient limited by pain     [] Patient limited by other medical complications  [x] Other: No complaints with today's program. Moderate edema is still present. Brace was unlocked to 90 degrees today. Pt was advised that he may begin ambulating without AD at home. He should use 1 crutch when in public. Progressed resistance program today without problem. Had to limit wall sits to 45 degrees today to stay within pain-free ranges. ROM is progressing very well. Patient Education:  Reviewed diagnosis, POC, HEP and its importance.                 Access Code: [email or web address]  URL: [email or web address]  Date: 09/09/2022  Prepared by: Anahi Pratt     Exercises  Long Sitting Calf Stretch with Strap - 2-3 x daily - 7 x weekly - 5 reps - 30 hold  Seated Table Hamstring Stretch - 2-3 x daily - 7 x weekly - 5 reps - 30 hold  Long Sitting Quad Set - 2-3 x daily - 7 x weekly - 10 reps - 10 hold  Supine Straight Leg Hip Adduction and Quad Set with Ball - 2-3 x daily - 7 x weekly - 10 reps - 10 hold  Sidelying Hip Abduction - 2-3 x daily - 7 x weekly - 3 sets - 10 reps  Prone Hip Extension on Table - 2-3 x daily - 7 x weekly - 3 sets - 10 reps - 1 hold  Supine Active Straight Leg Raise - 2-3 x daily - 7 x weekly - 3 sets - 10 reps - 1 hold  Standing Hip Flexion AROM - 2-3 x daily - 7 x weekly - 3 sets - 10 reps  Sitting Heel Slide with Towel - 2-3 x daily - 7 x weekly - 10 reps - 10 hold      PLAN:   [x] Continue per plan of care [] Alter current plan (see comments above)  [] Plan of care initiated [] Hold pending MD visit [] Discharge    Progress per surgical restrictions  Open brace to 90° as francia    Electronically signed by:  Joan Camarillo PT    Note: If patient does not return for scheduled/ recommended follow up visits, this note will serve as a discharge from care along with most recent update on progress.

## 2022-10-06 ENCOUNTER — HOSPITAL ENCOUNTER (OUTPATIENT)
Dept: PHYSICAL THERAPY | Age: 37
Setting detail: THERAPIES SERIES
Discharge: HOME | End: 2022-10-06
Payer: COMMERCIAL

## 2022-10-06 PROCEDURE — 97110 THERAPEUTIC EXERCISES: CPT | Performed by: PHYSICAL THERAPIST

## 2022-10-06 PROCEDURE — 97530 THERAPEUTIC ACTIVITIES: CPT | Performed by: PHYSICAL THERAPIST

## 2022-10-06 PROCEDURE — 97112 NEUROMUSCULAR REEDUCATION: CPT | Performed by: PHYSICAL THERAPIST

## 2022-10-06 PROCEDURE — 97016 VASOPNEUMATIC DEVICE THERAPY: CPT | Performed by: PHYSICAL THERAPIST

## 2022-10-06 NOTE — FLOWSHEET NOTE
[address], [address] [place] [address] [address] [facility], [address] [place] [address]  Phone: [phone]   Fax:     [phone]    Physical Therapy Daily Treatment Note  Date:  10/6/2022    Patient Name:  Arlyn Opitz    :  1985  MRN: [id number]  Restrictions/Precautions:    Medical/Treatment Diagnosis Information:  Diagnosis: 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 SURGEON:  Sherie Pike MD     ASSISTANT:  Rosita Pena MD     ANESTHESIA:  General with adductor canal block. PREOPERATIVE DIAGNOSIS:  Focal cartilage defect, medial facet patella. POSTOPERATIVE DIAGNOSES:  Focal cartilage defect, medial facet patella  with synovitis. Insurance/Certification information:  PT Insurance Information: [employer] auth needed  Physician Information:   Sherie Pike  Has the plan of care been signed (Y/N):        []  Yes  [x]  No     Date of Patient follow up with Physician: per md      Is this a Progress Report:     []  Yes  [x]  No        If Yes:  Date Range for reporting period:  Beginning  Ending    Progress report will be due (10 Rx or 30 days whichever is less):       Recertification will be due (POC Duration  / 90 days whichever is less):          Visit # Insurance Allowable Auth Required   5 60 per yr   [x]  Yes COHERE []  No        Functional Scale: Foto 21/    Date assessed:         Latex Allergy:  [x]NO      []YES  Preferred Language for Healthcare:   [x]English       []other:    Pain level:  3/10      SUBJECTIVE:  Reports that his knee is doing well overall. He notes that he is using 1 crutch when in public and and no crutches around the house. He is compliant with TROM wear. Pain is well managed with ADLs.   Still notes moderate edema. OBJECTIVE:     Flexibility L R Comment   Hamstring + mild      Gastroc  + mild       ITB         Quad                      9/28          ROM PROM AROM Overpressure Comment     L R L R L R     Flexion 105               Extension 0                                                         Strength L R Comment   Quad fair       Hamstring         Gastroc         Hip  flexion         Hip abd                                   Special Test Results/Comment   Meniscal Click     Crepitus     Flexion Test     Valgus Laxity     Varus Laxity     Lachmans     Drop Back     Homans neg               Girth L R   Mid Patella Moderate effusion     Suprapatellar moderate effucion     5cm above       15cm above Atrophy compared to R        Reflexes/Sensation:               [x]Dermatomes/Myotomes intact               []Reflexes equal and normal bilaterally              []Other:     Joint mobility: not assessed               []Normal                       []Hypo              []Hyper     Palpation: general TTP due to recent surgery      Functional Mobility/Transfers: modified I     Posture: WFL for a 30 yr old male      Bandages/Dressings/Incisions: CDI     Gait: >50% PWB, TROM unlocked 0-90° increase to FWB as quad improves     Orthopedic Special Tests:     RESTRICTIONS/PRECAUTIONS: PF cartilage transplant, progress to full WB with TROM locked as quad recruitment improves. TROM locked for gait 0-4 weeks. ROM 0-90  0-4 weeks then gradually increase to full by 8 weeks.       Exercises/Interventions:   Exercise/Equipment Resistance/Repetitions Other comments   Stretching     Hamstring [id number]       Inclined Calf 5x30\"    Hip Flexion     ITB     Groin     Quad                    SLR     Supine 3x10, 3#       Abduction 3x10, 5#    Adduction     EOB-hip ext 3x10, 4#    Clamshells SL  Blue loop 3x10    SLR+ 5x15\"         Isometrics     Quad sets [id number]    Adduc set [id number]    Patellar Glides     Medial     Superior Inferior          ROM     Sheet Pulls 10x10\"    Hang Weights     Passive     Active     Weight Shift     Ankle Pumps                    CKC     Calf raises   3x10 standing   Wall [place]@[employer] 5x20\"    Step ups     1 leg stand     Squatting     CC TKE     sidestepping Blue loop 4 laps    Balance PS L9 3'    bridges Blue loop 3x10         PRE     Extension  RANGE:   Flexion  RANGE:        Quantum machines     Leg press      Leg extension     Leg curl          Manual interventions          Gait training 5' With brace unlocked         Therapeutic Exercise and NMR EXR  [x] (31243) Provided verbal/tactile cueing for activities related to strengthening, flexibility, endurance, ROM for improvements in LE, proximal hip, and core control with self care, mobility, lifting, ambulation. [x] (00976) Provided verbal/tactile cueing for activities related to improving balance, coordination, kinesthetic sense, posture, motor skill, proprioception  to assist with LE, proximal hip, and core control in self care, mobility, lifting, ambulation and eccentric single leg control.      NMR and Therapeutic Activities:    [] (79726 or 59855) Provided verbal/tactile cueing for activities related to improving balance, coordination, kinesthetic sense, posture, motor skill, proprioception and motor activation to allow for proper function of core, proximal hip and LE with self care and ADLs  [] (13101) Gait Re-education- Provided training and instruction to the patient for proper LE, core and proximal hip recruitment and positioning and eccentric body weight control with ambulation re-education including up and down stairs     Home Exercise Program:    [x] (71003) Reviewed/Progressed HEP activities related to strengthening, flexibility, endurance, ROM of core, proximal hip and LE for functional self-care, mobility, lifting and ambulation/stair navigation   [] (59249)Reviewed/Progressed HEP activities related to improving balance, coordination, kinesthetic sense, posture, motor skill, proprioception of core, proximal hip and LE for self care, mobility, lifting, and ambulation/stair navigation      Manual Treatments:  PROM / STM / Oscillations-Mobs:  G-I, II, III, IV (PA's, Inf., Post.)  [] (17411) Provided manual therapy to mobilize LE, proximal hip and/or LS spine soft tissue/joints for the purpose of modulating pain, promoting relaxation,  increasing ROM, reducing/eliminating soft tissue swelling/inflammation/restriction, improving soft tissue extensibility and allowing for proper ROM for normal function with self care, mobility, lifting and ambulation. Modalities:  vaso 15    Charges:  Timed Code Treatment Minutes: 46'   Total Treatment Minutes: 8:38-9:37  61'       [] EVAL (LOW) 82951 (typically 20 minutes face-to-face)  [] EVAL (MOD) 23179 (typically 30 minutes face-to-face)  [] EVAL (HIGH) 25589 (typically 45 minutes face-to-face)  [] RE-EVAL   [x] PZ(43205) x 1    [] IONTO  [x] NMR (22195) x  1   [x] VASO  [] Manual (14839) x      [] Other:  [x] TA x  1    [] Mech Traction (69522)  [] ES(attended) (53606)      [] ES (un) (18385):     GOALS:   Patient stated goal: return to light light sports    [] Progressing: [] Met: [] Not Met: [] Adjusted     Therapist goals for Patient:   Short Term Goals: To be achieved in: 2 -4weeks  1. Independent in HEP and progression per patient tolerance, in order to prevent re-injury. [] Progressing: [] Met: [] Not Met: [] Adjusted   2. Patient will have a decrease in pain to facilitate improvement in movement, function, and ADLs as indicated by Functional Deficits. [] Progressing: [] Met: [] Not Met: [] Adjusted     Long Term Goals: To be achieved in: 12 weeks  1. Foto 62/100 to assist with reaching prior level of function. [] Progressing: [] Met: [] Not Met: [] Adjusted  2. Patient will demonstrate increased AROM to = R to allow for proper joint functioning as indicated by patients Functional Deficits. [] Progressing: [] Met: [] Not Met: [] Adjusted  3. Patient will demonstrate an increase in Strength to good proximal hip strength and control  in LE to allow for proper functional mobility as indicated by patients Functional Deficits. [] Progressing: [] Met: [] Not Met: [] Adjusted  4. Patient will return to  functional activities normal heel to toe gait in community, ascend and descend 12 steps alternating without increased symptoms or restriction. [] Progressing: [] Met: [] Not Met: [] Adjusted  Overall Progression Towards Functional goals/ Treatment Progress Update:  [] Patient is progressing as expected towards functional goals listed. [] Progression is slowed due to complexities/Impairments listed. [] Progression has been slowed due to co-morbidities. [x] Plan just implemented, too soon to assess goals progression <30days   [] Goals require adjustment due to lack of progress  [] Patient is not progressing as expected and requires additional follow up with physician  [] Other    Prognosis for POC: [x] Good [] Fair  [] Poor      Patient requires continued skilled intervention: [x] Yes  [] No    Treatment/Activity Tolerance:  [x] Patient able to complete treatment  [] Patient limited by fatigue  [] Patient limited by pain     [] Patient limited by other medical complications  [x] Other: No complaints with today's program. Moderate edema is still present. Progressed resistance program today without problem. Gait pattern looked good in clinic today. Pt was advised that he may begin ambulating without AD at this time. He should still wear brace when in public. ROM is progressing as expected. Patient Education:  Reviewed diagnosis, POC, HEP and its importance. Access Code: [email or web address]  URL: [email or web address]  Date: 09/09/2022  Prepared by: Jacques Ferreira     Exercises  Long Sitting Calf Stretch with Strap - 2-3 x daily - 7 x weekly - 5 reps - 30 hold  Seated Table Hamstring Stretch - 2-3 x daily - 7 x weekly - 5 reps - 30 hold  Long Sitting Quad Set - 2-3 x daily - 7 x weekly - 10 reps - 10 hold  Supine Straight Leg Hip Adduction and Quad Set with Ball - 2-3 x daily - 7 x weekly - 10 reps - 10 hold  Sidelying Hip Abduction - 2-3 x daily - 7 x weekly - 3 sets - 10 reps  Prone Hip Extension on Table - 2-3 x daily - 7 x weekly - 3 sets - 10 reps - 1 hold  Supine Active Straight Leg Raise - 2-3 x daily - 7 x weekly - 3 sets - 10 reps - 1 hold  Standing Hip Flexion AROM - 2-3 x daily - 7 x weekly - 3 sets - 10 reps  Sitting Heel Slide with Towel - 2-3 x daily - 7 x weekly - 10 reps - 10 hold      PLAN:   [x] Continue per plan of care [] Alter current plan (see comments above)  [] Plan of care initiated [] Hold pending MD visit [] Discharge    Progress per surgical restrictions  Open brace to 90° as francia    Electronically signed by:  Otilia Schaefer, PT    Note: If patient does not return for scheduled/ recommended follow up visits, this note will serve as a discharge from care along with most recent update on progress.

## 2022-10-12 ENCOUNTER — HOSPITAL ENCOUNTER (OUTPATIENT)
Dept: PHYSICAL THERAPY | Age: 37
Setting detail: THERAPIES SERIES
Discharge: HOME | End: 2022-10-12
Payer: COMMERCIAL

## 2022-10-12 PROCEDURE — 97016 VASOPNEUMATIC DEVICE THERAPY: CPT | Performed by: PHYSICAL THERAPY ASSISTANT

## 2022-10-12 PROCEDURE — 97112 NEUROMUSCULAR REEDUCATION: CPT | Performed by: PHYSICAL THERAPY ASSISTANT

## 2022-10-12 PROCEDURE — 97110 THERAPEUTIC EXERCISES: CPT | Performed by: PHYSICAL THERAPY ASSISTANT

## 2022-10-12 PROCEDURE — 97530 THERAPEUTIC ACTIVITIES: CPT | Performed by: PHYSICAL THERAPY ASSISTANT

## 2022-10-12 NOTE — FLOWSHEET NOTE
[facility], [address] [employer] [address] [place] [address], [address] [place] [address]  Phone: [phone]   Fax:     [phone]    Physical Therapy Daily Treatment Note  Date:  10/12/2022    Patient Name:  Javier Jaime    :  1985  MRN: [id number]  Restrictions/Precautions:    Medical/Treatment Diagnosis Information:  Diagnosis: 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 SURGEON:  Dorita White MD     ASSISTANT:  Criss Boyce MD     ANESTHESIA:  General with adductor canal block. PREOPERATIVE DIAGNOSIS:  Focal cartilage defect, medial facet patella. POSTOPERATIVE DIAGNOSES:  Focal cartilage defect, medial facet patella  with synovitis. Insurance/Certification information:  PT Insurance Information: [employer]here auth needed  Physician Information:   Dorita White  Has the plan of care been signed (Y/N):        []  Yes  [x]  No     Date of Patient follow up with Physician: per md      Is this a Progress Report:     []  Yes  [x]  No        If Yes:  Date Range for reporting period:  Beginning  Ending    Progress report will be due (10 Rx or 30 days whichever is less): 15/7      Recertification will be due (POC Duration  / 90 days whichever is less):          Visit # Insurance Allowable Auth Required   6 60 per yr   [x]  Yes COHERE []  No        Functional Scale: Foto 21/100    Date assessed:         Latex Allergy:  [x]NO      []YES  Preferred Language for Healthcare:   [x]English       []other:    Pain level:  3/10      SUBJECTIVE:  Pt reports feeling well today. He is able to tolerate being on is feet for 4 hours before starting to feel stiff. Stairs are going well with a non-reciprocal pattern.  He has attempted a reciprocal pattern and has some difficulty, but is not sure if this is something he is supposed to do yet. OBJECTIVE:     Flexibility L R Comment   Hamstring + mild      Gastroc  + mild       ITB         Quad                      9/28          ROM PROM AROM Overpressure Comment     L R L R L R     Flexion 124 (10/12)               Extension 0                                                         Strength L R Comment   Quad fair       Hamstring         Gastroc         Hip  flexion         Hip abd                                   Special Test Results/Comment   Meniscal Click     Crepitus     Flexion Test     Valgus Laxity     Varus Laxity     Lachmans     Drop Back     Homans neg               Girth L R   Mid Patella Moderate effusion     Suprapatellar moderate effucion     5cm above       15cm above Atrophy compared to R        Reflexes/Sensation:               [x]Dermatomes/Myotomes intact               []Reflexes equal and normal bilaterally              []Other:     Joint mobility: not assessed               []Normal                       []Hypo              []Hyper     Palpation: general TTP due to recent surgery      Functional Mobility/Transfers: modified I     Posture: WFL for a 30 yr old male      Bandages/Dressings/Incisions: mild raised rash anterior knee     Gait: WBAT, TROM unlocked 0-open° wears when outside of house     Orthopedic Special Tests:     RESTRICTIONS/PRECAUTIONS: PF cartilage transplant, progress to full WB with TROM locked as quad recruitment improves. TROM locked for gait 0-4 weeks. ROM 0-90  0-4 weeks then gradually increase to full by 8 weeks.       Exercises/Interventions:   Exercise/Equipment Resistance/Repetitions Other comments   Stretching     Hamstring [id number]       Inclined Calf 5x30\"    Hip Flexion     ITB     Groin     Quad                    SLR     Supine 3x10, 5#       Abduction 3x10, 10#    Adduction     EOB-hip ext 3x10, 5#    Clamshells SL  Blue loop 3x10    SLR+ 5x20\"         Isometrics Quad sets [id number]    Adduc set [id number]    Patellar Glides     Medial     Superior     Inferior          ROM     Sheet Pulls 10x10\"    Hang Weights     Passive     Active     Weight Shift     Ankle Pumps                    CKC     Calf raises   3x10 standing   Wall Danielle@[employer] 5x25\"    Step ups     1 leg stand     Squatting     CC TKE     Sidesteps/monster walks Blue loop 4 laps each    Balance PS L9 3'    bridges Blue loop 3x10         PRE     Extension  RANGE:   Flexion  RANGE:        Quantum machines     Leg press      Leg extension     Leg curl          Manual interventions          Gait training 5' With brace unlocked         Therapeutic Exercise and NMR EXR  [x] (19054) Provided verbal/tactile cueing for activities related to strengthening, flexibility, endurance, ROM for improvements in LE, proximal hip, and core control with self care, mobility, lifting, ambulation. [x] (45692) Provided verbal/tactile cueing for activities related to improving balance, coordination, kinesthetic sense, posture, motor skill, proprioception  to assist with LE, proximal hip, and core control in self care, mobility, lifting, ambulation and eccentric single leg control.      NMR and Therapeutic Activities:    [] (64841 or 69445) Provided verbal/tactile cueing for activities related to improving balance, coordination, kinesthetic sense, posture, motor skill, proprioception and motor activation to allow for proper function of core, proximal hip and LE with self care and ADLs  [] (78350) Gait Re-education- Provided training and instruction to the patient for proper LE, core and proximal hip recruitment and positioning and eccentric body weight control with ambulation re-education including up and down stairs     Home Exercise Program:    [x] (26525) Reviewed/Progressed HEP activities related to strengthening, flexibility, endurance, ROM of core, proximal hip and LE for functional self-care, mobility, lifting and ambulation/stair navigation   [] (17235)Reviewed/Progressed HEP activities related to improving balance, coordination, kinesthetic sense, posture, motor skill, proprioception of core, proximal hip and LE for self care, mobility, lifting, and ambulation/stair navigation      Manual Treatments:  PROM / STM / Oscillations-Mobs:  G-I, II, III, IV (PA's, Inf., Post.)  [] (22322) Provided manual therapy to mobilize LE, proximal hip and/or LS spine soft tissue/joints for the purpose of modulating pain, promoting relaxation,  increasing ROM, reducing/eliminating soft tissue swelling/inflammation/restriction, improving soft tissue extensibility and allowing for proper ROM for normal function with self care, mobility, lifting and ambulation. Modalities:  vaso 10    Charges:  Timed Code Treatment Minutes: 54'   Total Treatment Minutes: 3:07-4:29  80'       [] EVAL (LOW) 28531 (typically 20 minutes face-to-face)  [] EVAL (MOD) 35379 (typically 30 minutes face-to-face)  [] EVAL (HIGH) 54970 (typically 45 minutes face-to-face)  [] RE-EVAL   [x] CE(13593) x 2    [] IONTO  [x] NMR (13581) x  1   [x] VASO  [] Manual (88022) x      [] Other:  [x] TA x  1    [] Mech Traction (58471)  [] ES(attended) (98911)      [] ES (un) (80656):     GOALS:   Patient stated goal: return to light light sports    [] Progressing: [] Met: [] Not Met: [] Adjusted     Therapist goals for Patient:   Short Term Goals: To be achieved in: 2 -4weeks  1. Independent in HEP and progression per patient tolerance, in order to prevent re-injury. [] Progressing: [] Met: [] Not Met: [] Adjusted   2. Patient will have a decrease in pain to facilitate improvement in movement, function, and ADLs as indicated by Functional Deficits. [] Progressing: [] Met: [] Not Met: [] Adjusted     Long Term Goals: To be achieved in: 12 weeks  1. Foto 62/100 to assist with reaching prior level of function. [] Progressing: [] Met: [] Not Met: [] Adjusted  2.  Patient will demonstrate increased AROM to = R to allow for proper joint functioning as indicated by patients Functional Deficits. [] Progressing: [] Met: [] Not Met: [] Adjusted  3. Patient will demonstrate an increase in Strength to good proximal hip strength and control  in LE to allow for proper functional mobility as indicated by patients Functional Deficits. [] Progressing: [] Met: [] Not Met: [] Adjusted  4. Patient will return to  functional activities normal heel to toe gait in community, ascend and descend 12 steps alternating without increased symptoms or restriction. [] Progressing: [] Met: [] Not Met: [] Adjusted  Overall Progression Towards Functional goals/ Treatment Progress Update:  [] Patient is progressing as expected towards functional goals listed. [] Progression is slowed due to complexities/Impairments listed. [] Progression has been slowed due to co-morbidities. [x] Plan just implemented, too soon to assess goals progression <30days   [] Goals require adjustment due to lack of progress  [] Patient is not progressing as expected and requires additional follow up with physician  [] Other    Prognosis for POC: [x] Good [] Fair  [] Poor      Patient requires continued skilled intervention: [x] Yes  [] No    Treatment/Activity Tolerance:  [x] Patient able to complete treatment  [] Patient limited by fatigue  [] Patient limited by pain     [] Patient limited by other medical complications  [x] Other: No complaints with today's program. Pt able to progress resistance for various PREs with satisfactory tolerance. Knee flexion progressing as expected. Educated pt on refraining from attempting reciprocal gait on stairs for the time being to avoid excessive forces through the knee while healing. Pt would benefit from continued therapy to improve LE strength and knee ROM for return to PLOF. Patient Education:  Reviewed diagnosis, POC, HEP and its importance.                 Access Code: [email or web address]  URL: [email or web address]  Date: 09/09/2022  Prepared by: Anahi Pratt     Exercises  Long Sitting Calf Stretch with Strap - 2-3 x daily - 7 x weekly - 5 reps - 30 hold  Seated Table Hamstring Stretch - 2-3 x daily - 7 x weekly - 5 reps - 30 hold  Long Sitting Quad Set - 2-3 x daily - 7 x weekly - 10 reps - 10 hold  Supine Straight Leg Hip Adduction and Quad Set with Ball - 2-3 x daily - 7 x weekly - 10 reps - 10 hold  Sidelying Hip Abduction - 2-3 x daily - 7 x weekly - 3 sets - 10 reps  Prone Hip Extension on Table - 2-3 x daily - 7 x weekly - 3 sets - 10 reps - 1 hold  Supine Active Straight Leg Raise - 2-3 x daily - 7 x weekly - 3 sets - 10 reps - 1 hold  Standing Hip Flexion AROM - 2-3 x daily - 7 x weekly - 3 sets - 10 reps  Sitting Heel Slide with Towel - 2-3 x daily - 7 x weekly - 10 reps - 10 hold      PLAN:   [x] Continue per plan of care [] Alter current plan (see comments above)  [] Plan of care initiated [] Hold pending MD visit [] Discharge  Progress per surgical restrictions      Electronically signed by:  XOCHITL Portillo, GRICELDA  Note: If patient does not return for scheduled/ recommended follow up visits, this note will serve as a discharge from care along with most recent update on progress.

## 2022-10-14 ENCOUNTER — HOSPITAL ENCOUNTER (OUTPATIENT)
Dept: PHYSICAL THERAPY | Age: 37
Setting detail: THERAPIES SERIES
Discharge: HOME | End: 2022-10-14
Payer: COMMERCIAL

## 2022-10-14 PROCEDURE — 97530 THERAPEUTIC ACTIVITIES: CPT | Performed by: PHYSICAL THERAPY ASSISTANT

## 2022-10-14 PROCEDURE — 97112 NEUROMUSCULAR REEDUCATION: CPT | Performed by: PHYSICAL THERAPY ASSISTANT

## 2022-10-14 PROCEDURE — 97110 THERAPEUTIC EXERCISES: CPT | Performed by: PHYSICAL THERAPY ASSISTANT

## 2022-10-14 PROCEDURE — 97016 VASOPNEUMATIC DEVICE THERAPY: CPT | Performed by: PHYSICAL THERAPY ASSISTANT

## 2022-10-14 NOTE — FLOWSHEET NOTE
[address], Former[facility] [facility], [address] [place] [address]  Phone: [phone]   Fax:     [phone]    Physical Therapy Daily Treatment Note  Date:  10/14/2022    Patient Name:  Ignacio Olson    :  1985  MRN: [id number]  Restrictions/Precautions:    Medical/Treatment Diagnosis Information:  Diagnosis: 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 SURGEON:  Edward Slaughter MD     ASSISTANT:  Porsha Rasmussen MD     ANESTHESIA:  General with adductor canal block. PREOPERATIVE DIAGNOSIS:  Focal cartilage defect, medial facet patella. POSTOPERATIVE DIAGNOSES:  Focal cartilage defect, medial facet patella  with synovitis. Insurance/Certification information:  PT Insurance Information: [employer] auth needed  Physician Information:   Edward Slaughter  Has the plan of care been signed (Y/N):        []  Yes  [x]  No     Date of Patient follow up with Physician: per md      Is this a Progress Report:     []  Yes  [x]  No        If Yes:  Date Range for reporting period:  Beginning  Ending    Progress report will be due (10 Rx or 30 days whichever is less):       Recertification will be due (POC Duration  / 90 days whichever is less):          Visit # Insurance Allowable Auth Required   7 60 per yr   [x]  Yes COHERE []  No        Functional Scale: Foto /    Date assessed:         Latex Allergy:  [x]NO      []YES  Preferred Language for Healthcare:   [x]English       []other:    Pain level:  3/10      SUBJECTIVE:  Pt reported some soreness in knee after last session that lasted for a day but is back to normal now.  He has had to be on his feet for hours at a time for work for the first time this week, and was able to stand for about an hour before needing to sit down. He denies change in skin irritation since last visit, but has worn shorts today to see if that makes a difference. OBJECTIVE:     Flexibility L R Comment   Hamstring + mild      Gastroc  + mild       ITB         Quad                                ROM PROM AROM Overpressure Comment     L R L R L R     Flexion 129 (10/14)  Sheet pulls               Extension 0                                                         Strength L R Comment   Quad fair       Hamstring         Gastroc         Hip  flexion         Hip abd                                   Special Test Results/Comment   Meniscal Click     Crepitus     Flexion Test     Valgus Laxity     Varus Laxity     Lachmans     Drop Back     Homans neg               Girth L R   Mid Patella Moderate effusion     Suprapatellar moderate effucion     5cm above       15cm above Atrophy compared to R        Reflexes/Sensation:               [x]Dermatomes/Myotomes intact               []Reflexes equal and normal bilaterally              []Other:     Joint mobility: not assessed               []Normal                       []Hypo              []Hyper     Palpation: general TTP due to recent surgery      Functional Mobility/Transfers: modified I     Posture: WFL for a 30 yr old male      Bandages/Dressings/Incisions: mild raised rash anterior knee     Gait: WBAT, TROM unlocked 0-open° wears when outside of house     Orthopedic Special Tests:     RESTRICTIONS/PRECAUTIONS: PF cartilage transplant, progress to full WB with TROM locked as quad recruitment improves. TROM locked for gait 0-4 weeks. ROM 0-90  0-4 weeks then gradually increase to full by 8 weeks.       Exercises/Interventions:   Exercise/Equipment Resistance/Repetitions Other comments   Stretching     Hamstring 17cent6       Inclined Calf 5x30\"    Hip Flexion     ITB     Groin     Quad                    SLR     Supine 3x10, 5#       Abduction 3x10, 10#    Adduction EOB-hip ext 3x10, 5#    Clamshells SL  Blue loop 3x10    SLR+ 5x25\"         Isometrics     Quad sets [id number]    Adduc set [id number]    Patellar Glides     Medial     Superior     Inferior          ROM     Sheet Pulls 10x10\"    Hang Weights     Passive     Active     Weight Shift     Ankle Pumps                    CKC     Calf raises   3x10 standing   Wall [email or web address] 5x30\"    Step ups     1 leg stand     Squatting     CC TKE     Sidesteps/monster walks Blue loop 4 laps each    Balance PS L5 3' Color in [place]   bridges Blue loop 3x10         PRE     Extension  RANGE:   Flexion  RANGE:        Quantum machines     Leg press      Leg extension     Leg curl          Manual interventions          Gait training 5' With brace unlocked         Therapeutic Exercise and NMR EXR  [x] (73314) Provided verbal/tactile cueing for activities related to strengthening, flexibility, endurance, ROM for improvements in LE, proximal hip, and core control with self care, mobility, lifting, ambulation. [x] (91740) Provided verbal/tactile cueing for activities related to improving balance, coordination, kinesthetic sense, posture, motor skill, proprioception  to assist with LE, proximal hip, and core control in self care, mobility, lifting, ambulation and eccentric single leg control.      NMR and Therapeutic Activities:    [] (09824 or 60433) Provided verbal/tactile cueing for activities related to improving balance, coordination, kinesthetic sense, posture, motor skill, proprioception and motor activation to allow for proper function of core, proximal hip and LE with self care and ADLs  [] (08573) Gait Re-education- Provided training and instruction to the patient for proper LE, core and proximal hip recruitment and positioning and eccentric body weight control with ambulation re-education including up and down stairs     Home Exercise Program:    [x] (48400) Reviewed/Progressed HEP activities related to strengthening, flexibility, endurance, ROM of core, proximal hip and LE for functional self-care, mobility, lifting and ambulation/stair navigation   [] (71449)Reviewed/Progressed HEP activities related to improving balance, coordination, kinesthetic sense, posture, motor skill, proprioception of core, proximal hip and LE for self care, mobility, lifting, and ambulation/stair navigation      Manual Treatments:  PROM / STM / Oscillations-Mobs:  G-I, II, III, IV (PA's, Inf., Post.)  [] (49801) Provided manual therapy to mobilize LE, proximal hip and/or LS spine soft tissue/joints for the purpose of modulating pain, promoting relaxation,  increasing ROM, reducing/eliminating soft tissue swelling/inflammation/restriction, improving soft tissue extensibility and allowing for proper ROM for normal function with self care, mobility, lifting and ambulation. Modalities:  vaso 10    Charges:  Timed Code Treatment Minutes: 54'   Total Treatment Minutes: 2:00-3:20  80'       [] EVAL (LOW) 62580 (typically 20 minutes face-to-face)  [] EVAL (MOD) 58824 (typically 30 minutes face-to-face)  [] EVAL (HIGH) 64486 (typically 45 minutes face-to-face)  [] RE-EVAL   [x] GA(10426) x 2    [] IONTO  [x] NMR (36601) x  1   [x] VASO  [] Manual (18569) x      [] Other:  [x] TA x  1    [] Mech Traction (26328)  [] ES(attended) (82965)      [] ES (un) (08330):     GOALS:   Patient stated goal: return to light light sports    [] Progressing: [] Met: [] Not Met: [] Adjusted     Therapist goals for Patient:   Short Term Goals: To be achieved in: 2 -4weeks  1. Independent in HEP and progression per patient tolerance, in order to prevent re-injury. [] Progressing: [] Met: [] Not Met: [] Adjusted   2. Patient will have a decrease in pain to facilitate improvement in movement, function, and ADLs as indicated by Functional Deficits. [] Progressing: [] Met: [] Not Met: [] Adjusted     Long Term Goals: To be achieved in: 12 weeks  1.  Foto 62/100 to assist with reaching prior level of function. [] Progressing: [] Met: [] Not Met: [] Adjusted  2. Patient will demonstrate increased AROM to = R to allow for proper joint functioning as indicated by patients Functional Deficits. [] Progressing: [] Met: [] Not Met: [] Adjusted  3. Patient will demonstrate an increase in Strength to good proximal hip strength and control  in LE to allow for proper functional mobility as indicated by patients Functional Deficits. [] Progressing: [] Met: [] Not Met: [] Adjusted  4. Patient will return to  functional activities normal heel to toe gait in community, ascend and descend 12 steps alternating without increased symptoms or restriction. [] Progressing: [] Met: [] Not Met: [] Adjusted  Overall Progression Towards Functional goals/ Treatment Progress Update:  [] Patient is progressing as expected towards functional goals listed. [] Progression is slowed due to complexities/Impairments listed. [] Progression has been slowed due to co-morbidities. [x] Plan just implemented, too soon to assess goals progression <30days   [] Goals require adjustment due to lack of progress  [] Patient is not progressing as expected and requires additional follow up with physician  [] Other    Prognosis for POC: [x] Good [] Fair  [] Poor      Patient requires continued skilled intervention: [x] Yes  [] No    Treatment/Activity Tolerance:  [x] Patient able to complete treatment  [] Patient limited by fatigue  [] Patient limited by pain     [] Patient limited by other medical complications  [x] Other: No complaints with today's program. Pt demonstrates continued deficits in quad and glute strength that continues to progress as expected. Focusing primarily on isometric quad strength during WB exercise with satisfactory tolerance. Knee flexion continues to improve weekly to levels comparable to opposite LE.  Pt would benefit from continued therapy to progress strengthening as appropriate for return to PLOF.      Patient Education:  Reviewed diagnosis, POC, HEP and its importance. Access Code: [email or web address]  URL: [email or web address]  Date: 09/09/2022  Prepared by: Zaynab García     Exercises  Long Sitting Calf Stretch with Strap - 2-3 x daily - 7 x weekly - 5 reps - 30 hold  Seated Table Hamstring Stretch - 2-3 x daily - 7 x weekly - 5 reps - 30 hold  Long Sitting Quad Set - 2-3 x daily - 7 x weekly - 10 reps - 10 hold  Supine Straight Leg Hip Adduction and Quad Set with Ball - 2-3 x daily - 7 x weekly - 10 reps - 10 hold  Sidelying Hip Abduction - 2-3 x daily - 7 x weekly - 3 sets - 10 reps  Prone Hip Extension on Table - 2-3 x daily - 7 x weekly - 3 sets - 10 reps - 1 hold  Supine Active Straight Leg Raise - 2-3 x daily - 7 x weekly - 3 sets - 10 reps - 1 hold  Standing Hip Flexion AROM - 2-3 x daily - 7 x weekly - 3 sets - 10 reps  Sitting Heel Slide with Towel - 2-3 x daily - 7 x weekly - 10 reps - 10 hold      PLAN:   [x] Continue per plan of care [] Alter current plan (see comments above)  [] Plan of care initiated [] Hold pending MD visit [] Discharge  Progress per surgical restrictions      Electronically signed by:  XOCHITL Dumont SPT  Note: If patient does not return for scheduled/ recommended follow up visits, this note will serve as a discharge from care along with most recent update on progress.

## 2022-10-17 ENCOUNTER — APPOINTMENT (OUTPATIENT)
Dept: PHYSICAL THERAPY | Age: 37
End: 2022-10-17
Payer: COMMERCIAL

## 2022-10-20 ENCOUNTER — HOSPITAL ENCOUNTER (OUTPATIENT)
Dept: PHYSICAL THERAPY | Age: 37
Setting detail: THERAPIES SERIES
Discharge: HOME | End: 2022-10-20
Payer: COMMERCIAL

## 2022-10-20 NOTE — FLOWSHEET NOTE
The [address] and [address],  [facility], [facility] [address]  [address] [facility],[address]   [place], [address]  Phone: [phone]  Fax: [phone]      Physical Therapy  Cancellation/No-show Note  Patient Name:  Lionel Olson  :  1985   Date:  10/20/2022  Cancelled visits to date: 0  No-shows to date: 0    For today's appointment patient:  [x]  Cancelled  []  Rescheduled appointment  []  No-show     Reason given by patient:  [x]  Patient ill  []  Conflicting appointment   []  No transportation    []  Conflict with work  []  No reason given  []  Other:     Comments:      Electronically signed by:  Marvin Sargent PT

## 2022-10-24 ENCOUNTER — HOSPITAL ENCOUNTER (OUTPATIENT)
Dept: PHYSICAL THERAPY | Age: 37
Setting detail: THERAPIES SERIES
Discharge: HOME | End: 2022-10-24
Payer: COMMERCIAL

## 2022-10-24 ENCOUNTER — OFFICE VISIT (OUTPATIENT)
Dept: PRIMARY CARE CLINIC | Age: 37
End: 2022-10-24
Payer: COMMERCIAL

## 2022-10-24 ENCOUNTER — OFFICE VISIT (OUTPATIENT)
Dept: ORTHOPEDIC SURGERY | Age: 37
End: 2022-10-24

## 2022-10-24 VITALS
HEIGHT: 74 IN | SYSTOLIC BLOOD PRESSURE: 109 MMHG | DIASTOLIC BLOOD PRESSURE: 70 MMHG | WEIGHT: 217 LBS | OXYGEN SATURATION: 99 % | BODY MASS INDEX: 27.85 KG/M2 | TEMPERATURE: 97.5 F | HEART RATE: 73 BPM

## 2022-10-24 VITALS — BODY MASS INDEX: 28.23 KG/M2 | HEIGHT: 74 IN | WEIGHT: 220 LBS

## 2022-10-24 DIAGNOSIS — L08.9 SKIN INFECTION OF LEFT KNEE: Primary | ICD-10-CM

## 2022-10-24 DIAGNOSIS — Z98.890 HISTORY OF REPAIR OF ANTERIOR CRUCIATE LIGAMENT OF LEFT KNEE: ICD-10-CM

## 2022-10-24 DIAGNOSIS — Z98.890 S/P LEFT KNEE ARTHROSCOPY: Primary | ICD-10-CM

## 2022-10-24 DIAGNOSIS — Z23 FLU VACCINE NEED: ICD-10-CM

## 2022-10-24 PROCEDURE — 90471 IMMUNIZATION ADMIN: CPT | Performed by: FAMILY MEDICINE

## 2022-10-24 PROCEDURE — 97112 NEUROMUSCULAR REEDUCATION: CPT | Performed by: PHYSICAL THERAPIST

## 2022-10-24 PROCEDURE — 90674 CCIIV4 VAC NO PRSV 0.5 ML IM: CPT | Performed by: FAMILY MEDICINE

## 2022-10-24 PROCEDURE — 99213 OFFICE O/P EST LOW 20 MIN: CPT | Performed by: FAMILY MEDICINE

## 2022-10-24 PROCEDURE — 97110 THERAPEUTIC EXERCISES: CPT | Performed by: PHYSICAL THERAPIST

## 2022-10-24 PROCEDURE — 97530 THERAPEUTIC ACTIVITIES: CPT | Performed by: PHYSICAL THERAPIST

## 2022-10-24 PROCEDURE — 99024 POSTOP FOLLOW-UP VISIT: CPT | Performed by: ORTHOPAEDIC SURGERY

## 2022-10-24 RX ORDER — CLOBETASOL PROPIONATE 0.5 MG/G
OINTMENT TOPICAL
Qty: 30 G | Refills: 2 | Status: SHIPPED | OUTPATIENT
Start: 2022-10-24

## 2022-10-24 RX ORDER — CEPHALEXIN 500 MG/1
500 CAPSULE ORAL 4 TIMES DAILY
Qty: 28 CAPSULE | Refills: 0 | Status: SHIPPED | OUTPATIENT
Start: 2022-10-24 | End: 2022-10-31

## 2022-10-24 NOTE — PROGRESS NOTES
Chief Complaint  Follow-up (Left knee. S/p scope )      History of Present Illness:  Ignacio Olson is a pleasant 30 y.o. male here for follow-up regarding his left knee. He is 2-month status post left knee arthroscopy with chondroplasty, synovectomy, open patellar cartilage transplant. Doing well with no concerns. Has been progressing in physical therapy. Has been ambulating without a brace. He does report an anterior knee rash that has been persistent for approximately 1 month. This is slightly itchy, this is nonerythematous, this is not getting worse. He has an appointment to see his primary care provider today for evaluation of the rash      Medical History:  Patient's medications, allergies, past medical, surgical, social and family histories were reviewed and updated as appropriate. ROS: Review of systems reviewed from Patient History Form completed today and available in the patient's chart under the Media tab. Pertinent items are noted in HPI  Review of systems reviewed from Patient History Form completed today and available in the patient's chart under the Media tab. Vital Signs: There were no vitals taken for this visit. Left  knee examination:    Gait: No use of assistive devices. No antalgic gait. Alignment: normal alignment. Inspection/skin: Nonerythematous papules anterior knee. Palpation: Smooth translation of the patella. No joint line tenderness present. Range of Motion: There is full range of motion. Strength: Normal quadriceps development. Effusion: No effusion or swelling present. Ligamentous stability: No cruciate or collateral ligament instability. Neurologic and vascular: Skin is warm and well-perfused. Sensation is intact to light-touch. Radiology:       Pertinent imaging was interpreted and reviewed with the patient today, images only - no report available. No new imaging was obtained during today's visit.        Assessment : 79-year-old male 2-month status post left knee arthroscopy with chondroplasty synovectomy and open patellar cartilage transplant    Impression:  Encounter Diagnosis   Name Primary? S/P left knee arthroscopy Yes       Office Procedures:  No orders of the defined types were placed in this encounter. Plan: Pertinent imaging was reviewed. The etiology, natural history, and treatment options for the disorder were discussed. The roles of activity medication, antiinflammatories, injections, bracing, physical therapy, and surgical interventions were all described to the patient and questions were answered. Patient is doing well overall. He has good translation of the patella. He still has some quadriceps weakness to be expected. He does have an anterior rash, this will be evaluated with his primary care provider. In the meantime we will provide him with some clobetasol cream to be applied over the area. Follow-up with us in 1 month or sooner if worsening symptoms. Rosalino Moise is in agreement with this plan. All questions were answered to patient's satisfaction and was encouraged to call with any further questions. Jessica Dominguez, [address] [place] [address]  10/24/2022    This dictation was performed with a verbal recognition program [facility]) and it was checked for errors. It is possible that there are still dictated errors within this office note. If so, please bring any areas to my attention for an addendum. All efforts were made to ensure that this office note is accurate. I attest that I met personally with the patient, performed the described exam, reviewed the radiographic studies and medical records associated with this patient and supervised the services that are described above.      Kaylin Bob MD

## 2022-10-24 NOTE — FLOWSHEET NOTE
[address], [address] [place] [address] [address] [facility], [address] [place] [address]  Phone: [phone]   Fax:     [phone]    Physical Therapy Daily Treatment Note  Date:  10/24/2022    Patient Name:  He Levin    :  1985  MRN: [id number]  Restrictions/Precautions:    Medical/Treatment Diagnosis Information:  Diagnosis: 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 SURGEON:  Sheri Kay MD     ASSISTANT:  Grecia León MD     ANESTHESIA:  General with adductor canal block. PREOPERATIVE DIAGNOSIS:  Focal cartilage defect, medial facet patella. POSTOPERATIVE DIAGNOSES:  Focal cartilage defect, medial facet patella  with synovitis. Insurance/Certification information:  PT Insurance Information: [employer] auth needed  Physician Information:   Sheri Kay  Has the plan of care been signed (Y/N):        []  Yes  [x]  No     Date of Patient follow up with Physician: per md      Is this a Progress Report:     []  Yes  [x]  No        If Yes:  Date Range for reporting period:  Beginning  Ending    Progress report will be due (10 Rx or 30 days whichever is less):       Recertification will be due (POC Duration  / 90 days whichever is less):          Visit # Insurance Allowable Auth Required   8 60 per yr   [x]  Yes COHERE []  No        Functional Scale: Foto 21/100    Date assessed:         Latex Allergy:  [x]NO      []YES  Preferred Language for Healthcare:   [x]English       []other:    Pain level:  3/10      SUBJECTIVE:  Pt reports that his knee is doing well overall. He notes that it is feeling closer to normal.  Still having some difficulty managing stairs. He is no longer using AD or brace.   He does still have a small rash present along the anterior knee. The rash is also beginning to be present on opposite knee and right arm. No change in meds present. He is not aware of any changes in soaps, lotions or detergents. He is scheduled to see the referring physician as well as his PCP today. OBJECTIVE:     Flexibility L R Comment   Hamstring + mild      Gastroc  + mild       ITB         Quad                                ROM PROM AROM Overpressure Comment     L R L R L R     Flexion 129 (10/14)  Sheet pulls               Extension 0                                                         Strength L R Comment   Quad fair       Hamstring         Gastroc         Hip  flexion         Hip abd                                   Special Test Results/Comment   Meniscal Click     Crepitus     Flexion Test     Valgus Laxity     Varus Laxity     Lachmans     Drop Back     Homans neg               Girth L R   Mid Patella Moderate effusion     Suprapatellar moderate effucion     5cm above       15cm above Atrophy compared to R        Reflexes/Sensation:               [x]Dermatomes/Myotomes intact               []Reflexes equal and normal bilaterally              []Other:     Joint mobility: not assessed               []Normal                       []Hypo              []Hyper     Palpation: general TTP due to recent surgery      Functional Mobility/Transfers: modified I     Posture: WFL for a 30 yr old male      Bandages/Dressings/Incisions: mild raised rash anterior knee     Gait: WBAT, TROM unlocked 0-open° wears when outside of house     Orthopedic Special Tests:     RESTRICTIONS/PRECAUTIONS: PF cartilage transplant, progress to full WB with TROM locked as quad recruitment improves. TROM locked for gait 0-4 weeks. ROM 0-90  0-4 weeks then gradually increase to full by 8 weeks.       Exercises/Interventions:   Exercise/Equipment Resistance/Repetitions Other comments   Stretching     Hamstring [id number]       Inclined Calf 5x30\"    Hip Flexion ITB     Groin     Quad          Bike recumbent 7'         SLR     Supine 3x10, 7.5#       Abduction 3x10, 12#    Adduction     EOB-hip ext 3x10, 7.5#    Clamshells SL  Blue loop 3x10    SLR+ 5x25\"         Isometrics           Patellar Glides     Medial     Superior     Inferior          ROM     Sheet Pulls 10x10\"    Hang Weights     Passive     Active     Weight Shift     Ankle Pumps                    CKC     Calf raises   7pl 3x10 standing   Wall Mere@[employer] 5x30\"    Step ups     1 leg stand     Squatting     CC TKE 50lbs 3x10    Sidesteps/monster walks Blue loop 4 laps each    Balance RC L4 3'    bridges Blue loop 3x10         PRE     Extension  RANGE:   Flexion  RANGE:        Quantum machines     Leg press      Leg extension     Leg curl DL 0-90 25lbs 3x10         Manual interventions          Gait training 5' With brace unlocked         Therapeutic Exercise and NMR EXR  [x] (94404) Provided verbal/tactile cueing for activities related to strengthening, flexibility, endurance, ROM for improvements in LE, proximal hip, and core control with self care, mobility, lifting, ambulation. [x] (72753) Provided verbal/tactile cueing for activities related to improving balance, coordination, kinesthetic sense, posture, motor skill, proprioception  to assist with LE, proximal hip, and core control in self care, mobility, lifting, ambulation and eccentric single leg control.      NMR and Therapeutic Activities:    [] (25515 or 47227) Provided verbal/tactile cueing for activities related to improving balance, coordination, kinesthetic sense, posture, motor skill, proprioception and motor activation to allow for proper function of core, proximal hip and LE with self care and ADLs  [] (01642) Gait Re-education- Provided training and instruction to the patient for proper LE, core and proximal hip recruitment and positioning and eccentric body weight control with ambulation re-education including up and down stairs     Home Exercise Program:    [x] (07389) Reviewed/Progressed HEP activities related to strengthening, flexibility, endurance, ROM of core, proximal hip and LE for functional self-care, mobility, lifting and ambulation/stair navigation   [] (58195)Reviewed/Progressed HEP activities related to improving balance, coordination, kinesthetic sense, posture, motor skill, proprioception of core, proximal hip and LE for self care, mobility, lifting, and ambulation/stair navigation      Manual Treatments:  PROM / STM / Oscillations-Mobs:  G-I, II, III, IV (PA's, Inf., Post.)  [] (58054) Provided manual therapy to mobilize LE, proximal hip and/or LS spine soft tissue/joints for the purpose of modulating pain, promoting relaxation,  increasing ROM, reducing/eliminating soft tissue swelling/inflammation/restriction, improving soft tissue extensibility and allowing for proper ROM for normal function with self care, mobility, lifting and ambulation. Modalities:  vaso 10 held 10/24    Charges:  Timed Code Treatment Minutes: Total Treatment Minutes: 8:43-10:00  68'       [] EVAL (LOW) 62442 (typically 20 minutes face-to-face)  [] EVAL (MOD) 01989 (typically 30 minutes face-to-face)  [] EVAL (HIGH) 51147 (typically 45 minutes face-to-face)  [] RE-EVAL   [x] PU(00387) x 2    [] IONTO  [x] NMR (48625) x  1   [] VASO  [] Manual (51113) x      [] Other:  [x] TA x  1    [] Mech Traction (53842)  [] ES(attended) (79883)      [] ES (un) (31003):     GOALS:   Patient stated goal: return to light light sports    [] Progressing: [] Met: [] Not Met: [] Adjusted     Therapist goals for Patient:   Short Term Goals: To be achieved in: 2 -4weeks  1. Independent in HEP and progression per patient tolerance, in order to prevent re-injury. [] Progressing: [] Met: [] Not Met: [] Adjusted   2. Patient will have a decrease in pain to facilitate improvement in movement, function, and ADLs as indicated by Functional Deficits.     [] Progressing: [] Met: [] Not Met: [] Adjusted     Long Term Goals: To be achieved in: 12 weeks  1. Foto 62/100 to assist with reaching prior level of function. [] Progressing: [] Met: [] Not Met: [] Adjusted  2. Patient will demonstrate increased AROM to = R to allow for proper joint functioning as indicated by patients Functional Deficits. [] Progressing: [] Met: [] Not Met: [] Adjusted  3. Patient will demonstrate an increase in Strength to good proximal hip strength and control  in LE to allow for proper functional mobility as indicated by patients Functional Deficits. [] Progressing: [] Met: [] Not Met: [] Adjusted  4. Patient will return to  functional activities normal heel to toe gait in community, ascend and descend 12 steps alternating without increased symptoms or restriction. [] Progressing: [] Met: [] Not Met: [] Adjusted  Overall Progression Towards Functional goals/ Treatment Progress Update:  [] Patient is progressing as expected towards functional goals listed. [] Progression is slowed due to complexities/Impairments listed. [] Progression has been slowed due to co-morbidities. [x] Plan just implemented, too soon to assess goals progression <30days   [] Goals require adjustment due to lack of progress  [] Patient is not progressing as expected and requires additional follow up with physician  [] Other    Prognosis for POC: [x] Good [] Fair  [] Poor      Patient requires continued skilled intervention: [x] Yes  [] No    Treatment/Activity Tolerance:  [x] Patient able to complete treatment  [] Patient limited by fatigue  [] Patient limited by pain     [] Patient limited by other medical complications  [x] Other: No complaints with today's program. Progressed resistance program without problem. ROM is progressing very well. Mild rash is still present over surgical knee. PT notified referring physician of the condition today. Pt to be seen by referring physician after therapy today.   Pt did note mild anterior knee discomfort with wall sits, however this was mild and did not increase in intensity or duration with the exercise. Patient Education:  Reviewed diagnosis, POC, HEP and its importance. Access Code: [email or web address]  URL: [email or web address]  Date: 09/09/2022  Prepared by: Dieudonne Allison     Exercises  Long Sitting Calf Stretch with Strap - 2-3 x daily - 7 x weekly - 5 reps - 30 hold  Seated Table Hamstring Stretch - 2-3 x daily - 7 x weekly - 5 reps - 30 hold  Long Sitting Quad Set - 2-3 x daily - 7 x weekly - 10 reps - 10 hold  Supine Straight Leg Hip Adduction and Quad Set with Ball - 2-3 x daily - 7 x weekly - 10 reps - 10 hold  Sidelying Hip Abduction - 2-3 x daily - 7 x weekly - 3 sets - 10 reps  Prone Hip Extension on Table - 2-3 x daily - 7 x weekly - 3 sets - 10 reps - 1 hold  Supine Active Straight Leg Raise - 2-3 x daily - 7 x weekly - 3 sets - 10 reps - 1 hold  Standing Hip Flexion AROM - 2-3 x daily - 7 x weekly - 3 sets - 10 reps  Sitting Heel Slide with Towel - 2-3 x daily - 7 x weekly - 10 reps - 10 hold      PLAN:   [x] Continue per plan of care [] Alter current plan (see comments above)  [] Plan of care initiated [] Hold pending MD visit [] Discharge    Progress per surgical restrictions      Electronically signed by:  Stan Workman, PT  Enrrique Scherer, SPT  Note: If patient does not return for scheduled/ recommended follow up visits, this note will serve as a discharge from care along with most recent update on progress.

## 2022-10-24 NOTE — PROGRESS NOTES
[address] [facility] [address] [facility] PRIMARY CARE  [address] [place] [id number]  Dept: [phone]  Dept Fax: [phone]     10/24/2022      Sameer [place]   1985     Chief Complaint   Patient presents with    Skin Problem       HPI  Pt comes in today for eval:    SKIN: Has most recent L knee surgery end of August. Surgery well and was recovering just fine. A few weeks ago he started to notice small bumps to areas around the surgical incisions. The spots were somewhat itchy. Since then they have not resolved, and this area has only gotten larger to now cover the entirety of his left knee. He feels like he notices some other spots similar to this on the right knee and on the right upper extremity. The only thing that he is done for this is monitoring and has used over-the-counter hydrocortisone cream infrequently. He does not find that this topical treatment helps at all. Has been seen in f/u by Ortho a few times - surgery itself seems to be healing well. PHQ Scores 8/11/2022 6/14/2021   PHQ2 Score 0 0   PHQ9 Score 0 0     Interpretation of Total Score Depression Severity: 1-4 = Minimal depression, 5-9 = Mild depression, 10-14 = Moderate depression, 15-19 = Moderately severe depression, 20-27 = Severe depression     Prior to Visit Medications    Medication Sig Taking? Authorizing Provider   clobetasol (TEMOVATE) 0.05 % ointment Apply topically 2 times daily. Yes EDWIN Naranjo   Cetirizine HCl (ZYRTEC PO) Take by mouth Yes Historical Provider, MD   fluticasone (FLONASE) 50 MCG/ACT nasal spray 1 spray by Each Nostril route daily Yes Historical Provider, MD       Past Medical History:   Diagnosis Date    Environmental allergies 6/14/2021        Social History     Tobacco Use    Smoking status: Never    Smokeless tobacco: Never   Substance Use Topics    Alcohol use:  Yes     Alcohol/week: 0.0 standard drinks     Types: 5 - 8 Cans of beer per week     Comment: weekends    Drug use: No        Past Surgical History:   Procedure Laterality Date    ANTERIOR CRUCIATE LIGAMENT REPAIR  03/22/2012    left knee    INGUINAL HERNIA REPAIR Right     KNEE ARTHROSCOPY Left 8/30/2022    LEFT KNEE ARTHROSCOPY, CHONDROPLASTY, SYNOVECTIONY, OPEN PATELLA CARTILAGE TRANSPLANT performed by Rubi Nieves MD at [address] [facility] ARTHROSCOPY W/ ACL RECONSTRUCTION Left     KNEE ARTHROTOMY Left 8/30/2022    . performed by Rubi Nieves MD at [address] OR        No Known Allergies     Family History   Problem Relation Age of Onset    Arrhythmia Mother         A Fib    No Known Problems Brother     Heart Disease Maternal Grandfather         Patient's past medical history, surgical history, family history, medications, and allergies  were all reviewed and updated as appropriate today. Review of Systems   Constitutional:  Negative for fever. HENT:  Negative for ear pain and sore throat. Respiratory:  Negative for cough and shortness of breath. Cardiovascular:  Negative for chest pain. Gastrointestinal:  Negative for abdominal pain and nausea. Skin:  Positive for rash. Neurological:  Negative for dizziness and headaches. Hematological:  Negative for adenopathy. /70   Pulse 73   Temp 97.5 °F (36.4 °C)   Ht 6' 2\" (1.88 m)   Wt 217 lb (98.4 kg)   SpO2 99%   BMI 27.86 kg/m²      Physical Exam  Vitals reviewed. Constitutional:       General: He is not in acute distress. HENT:      Head: Normocephalic. Nose: Nose normal.      Mouth/Throat:      Mouth: Mucous membranes are moist.   Eyes:      Extraocular Movements: Extraocular movements intact. Pupils: Pupils are equal, round, and reactive to light. Cardiovascular:      Rate and Rhythm: Normal rate and regular rhythm. Heart sounds: No murmur heard. Pulmonary:      Effort: Pulmonary effort is normal.      Breath sounds: Normal breath sounds. No wheezing.    Abdominal:      General: Bowel sounds are normal.      Palpations: Abdomen is soft. Tenderness: There is no abdominal tenderness. Musculoskeletal:         General: No swelling or deformity. Cervical back: Neck supple. Lymphadenopathy:      Cervical: No cervical adenopathy. Skin:     General: Skin is warm and dry. Findings: Rash present. Rash is pustular. Comments: Rash mostly anterior L knee. Smaller patches R knee and RUE. No generalized erythema or warmth around the rash   Neurological:      Mental Status: He is alert and oriented to person, place, and time. Cranial Nerves: No cranial nerve deficit. Psychiatric:         Mood and Affect: Mood normal.         Behavior: Behavior is cooperative. Assessment:  Encounter Diagnoses   Name Primary? Skin infection of left knee (smaller sights R knee and RUE) Yes    History of repair of anterior cruciate ligament of left knee     Flu vaccine need        Plan:  1. Skin infection of left knee (smaller sights R knee and RUE)  Cute onset rash clinically most consistent with strep/staph skin infection. I would like to go ahead and empirically treat with a round of antibiotics. Given patient precautions and answered all questions. He is going to update me through [employer] in the next 7 to 10 days. - cephALEXin (KEFLEX) 500 MG capsule; Take 1 capsule by mouth 4 times daily for 7 days  Dispense: 28 capsule; Refill: 0    2. History of repair of anterior cruciate ligament of left knee  Continue recovery per Ortho    3. Flu vaccine need  Given today. - Influenza, FLUCELVAX, (age 10 mo+), IM, PF, 0.5 mL      Return if symptoms worsen or fail to improve. Elgie Bumps, DO     Please note that this chart was generated using dragon dictation software. Although every effort was made to ensure the accuracy of this automated transcription, some errors in transcription may have occurred.

## 2022-10-25 ASSESSMENT — ENCOUNTER SYMPTOMS
ABDOMINAL PAIN: 0
SORE THROAT: 0
NAUSEA: 0
COUGH: 0
SHORTNESS OF BREATH: 0

## 2022-10-27 ENCOUNTER — HOSPITAL ENCOUNTER (OUTPATIENT)
Dept: PHYSICAL THERAPY | Age: 37
Setting detail: THERAPIES SERIES
Discharge: HOME | End: 2022-10-27
Payer: COMMERCIAL

## 2022-10-27 PROCEDURE — 97110 THERAPEUTIC EXERCISES: CPT | Performed by: PHYSICAL THERAPIST

## 2022-10-27 PROCEDURE — 97530 THERAPEUTIC ACTIVITIES: CPT | Performed by: PHYSICAL THERAPIST

## 2022-10-27 PROCEDURE — 97112 NEUROMUSCULAR REEDUCATION: CPT | Performed by: PHYSICAL THERAPIST

## 2022-10-27 PROCEDURE — 97016 VASOPNEUMATIC DEVICE THERAPY: CPT | Performed by: PHYSICAL THERAPIST

## 2022-10-27 NOTE — FLOWSHEET NOTE
Osbaldo Cornel[address] 85, [address] [place] [address], [address] [place] [address]  Phone: [phone]   Fax:     [phone]    Physical Therapy Daily Treatment Note  Date:  10/27/2022    Patient Name:  Jaspreet Alfred    :  1985  MRN: [id number]  Restrictions/Precautions:    Medical/Treatment Diagnosis Information:  Diagnosis: 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 SURGEON:  Joselyn Terrazas MD     ASSISTANT:  Octavio Blum MD     ANESTHESIA:  General with adductor canal block. PREOPERATIVE DIAGNOSIS:  Focal cartilage defect, medial facet patella. POSTOPERATIVE DIAGNOSES:  Focal cartilage defect, medial facet patella  with synovitis. Insurance/Certification information:  PT Insurance Information: [employer] auth needed  Physician Information:   Joselyn Terrazas  Has the plan of care been signed (Y/N):        []  Yes  [x]  No     Date of Patient follow up with Physician: per md      Is this a Progress Report:     []  Yes  [x]  No        If Yes:  Date Range for reporting period:  Beginning  Ending    Progress report will be due (10 Rx or 30 days whichever is less): 51/3      Recertification will be due (POC Duration  / 90 days whichever is less):          Visit # Insurance Allowable Auth Required   9 60 per yr   [x]  Yes COHERE []  No        Functional Scale: Foto 21/100    Date assessed:         Latex Allergy:  [x]NO      []YES  Preferred Language for Healthcare:   [x]English       []other:    Pain level:  3/10      SUBJECTIVE:  Pt saw Dr after last session who was pleased with his progress. He was prescribed an oral steroid for the rash on his knee, which he thinks looks much better. No complaints with ADLs.  He is still not doing a reciprocal gait for stairs yet, but has been told not to thus far. OBJECTIVE:     Flexibility L R Comment   Hamstring + mild      Gastroc  + mild       ITB         Quad                                ROM PROM AROM Overpressure Comment     L R L R L R     Flexion 129 (10/14)  Sheet pulls               Extension 0                                                         Strength L R Comment   Quad fair       Hamstring         Gastroc         Hip  flexion         Hip abd                                   Special Test Results/Comment   Meniscal Click     Crepitus     Flexion Test     Valgus Laxity     Varus Laxity     Lachmans     Drop Back     Homans neg               Girth L R   Mid Patella Moderate effusion     Suprapatellar moderate effucion     5cm above       15cm above Atrophy compared to R        Reflexes/Sensation:               [x]Dermatomes/Myotomes intact               []Reflexes equal and normal bilaterally              []Other:     Joint mobility: not assessed               []Normal                       []Hypo              []Hyper     Palpation: general TTP due to recent surgery      Functional Mobility/Transfers: modified I     Posture: WFL for a 30 yr old male      Bandages/Dressings/Incisions: mild raised rash anterior knee     Gait: WBAT, TROM unlocked 0-open° wears when outside of house     Orthopedic Special Tests:     RESTRICTIONS/PRECAUTIONS: PF cartilage transplant, progress to full WB with TROM locked as quad recruitment improves. TROM locked for gait 0-4 weeks. ROM 0-90  0-4 weeks then gradually increase to full by 8 weeks.       Exercises/Interventions:   Exercise/Equipment Resistance/Repetitions Other comments   Stretching     Hamstring [id number]       Inclined Calf 5x30\"    Hip Flexion     ITB     Groin     Quad          Bike recumbent 7'         SLR     Supine 3x10, 7.5#       Abduction 3x10, 12#    Adduction     EOB-hip ext 3x10, 7.5#    Clamshells SL  Blue loop 3x10    SLR+ 5x30\" Isometrics           Patellar Glides     Medial     Superior     Inferior          ROM     Sheet Pulls 10x10\"    Hang Weights     Passive     Active     Weight Shift     Ankle Pumps                    CKC     Calf raises   10# DBs  3x10 standing   Wall [email or web address] 5x45\"    Step ups     1 leg stand     Squatting     CC TKE 50lbs 3x10    Sidesteps/monster walks Blue loop 4 laps each    Balance RC L6 3'    bridges Blue loop 3x10         PRE     Extension  RANGE:   Flexion  RANGE:        Quantum machines     Leg press  60# 3x10 60-10    Leg extension     Leg curl DL 0-90 40lbs 3x10         Manual interventions          Gait training 5' With brace unlocked         Therapeutic Exercise and NMR EXR  [x] (08347) Provided verbal/tactile cueing for activities related to strengthening, flexibility, endurance, ROM for improvements in LE, proximal hip, and core control with self care, mobility, lifting, ambulation. [x] (91450) Provided verbal/tactile cueing for activities related to improving balance, coordination, kinesthetic sense, posture, motor skill, proprioception  to assist with LE, proximal hip, and core control in self care, mobility, lifting, ambulation and eccentric single leg control.      NMR and Therapeutic Activities:    [] (49300 or 84733) Provided verbal/tactile cueing for activities related to improving balance, coordination, kinesthetic sense, posture, motor skill, proprioception and motor activation to allow for proper function of core, proximal hip and LE with self care and ADLs  [] (72332) Gait Re-education- Provided training and instruction to the patient for proper LE, core and proximal hip recruitment and positioning and eccentric body weight control with ambulation re-education including up and down stairs     Home Exercise Program:    [x] (22530) Reviewed/Progressed HEP activities related to strengthening, flexibility, endurance, ROM of core, proximal hip and LE for functional self-care, mobility, lifting and ambulation/stair navigation   [] (06900)Reviewed/Progressed HEP activities related to improving balance, coordination, kinesthetic sense, posture, motor skill, proprioception of core, proximal hip and LE for self care, mobility, lifting, and ambulation/stair navigation      Manual Treatments:  PROM / STM / Oscillations-Mobs:  G-I, II, III, IV (PA's, Inf., Post.)  [] (58065) Provided manual therapy to mobilize LE, proximal hip and/or LS spine soft tissue/joints for the purpose of modulating pain, promoting relaxation,  increasing ROM, reducing/eliminating soft tissue swelling/inflammation/restriction, improving soft tissue extensibility and allowing for proper ROM for normal function with self care, mobility, lifting and ambulation. Modalities:  vaso 10     Charges:  Timed Code Treatment Minutes: 61'   Total Treatment Minutes: 2:43-4:06    80'       [] EVAL (LOW) 42981 (typically 20 minutes face-to-face)  [] EVAL (MOD) 14315 (typically 30 minutes face-to-face)  [] EVAL (HIGH) 82654 (typically 45 minutes face-to-face)  [] RE-EVAL   [x] BROOKS(09934) x 2    [] IONTO  [x] NMR (82702) x  1   [x] VASO  [] Manual (60046) x      [] Other:  [x] TA x  1    [] Mech Traction (26358)  [] ES(attended) (00487)      [] ES (un) (58943):     GOALS:   Patient stated goal: return to light light sports    [] Progressing: [] Met: [] Not Met: [] Adjusted     Therapist goals for Patient:   Short Term Goals: To be achieved in: 2 -4weeks  1. Independent in HEP and progression per patient tolerance, in order to prevent re-injury. [] Progressing: [] Met: [] Not Met: [] Adjusted   2. Patient will have a decrease in pain to facilitate improvement in movement, function, and ADLs as indicated by Functional Deficits. [] Progressing: [] Met: [] Not Met: [] Adjusted     Long Term Goals: To be achieved in: 12 weeks  1. Foto 62/100 to assist with reaching prior level of function. [] Progressing: [] Met: [] Not Met: [] Adjusted  2. Patient will demonstrate increased AROM to = R to allow for proper joint functioning as indicated by patients Functional Deficits. [] Progressing: [] Met: [] Not Met: [] Adjusted  3. Patient will demonstrate an increase in Strength to good proximal hip strength and control  in LE to allow for proper functional mobility as indicated by patients Functional Deficits. [] Progressing: [] Met: [] Not Met: [] Adjusted  4. Patient will return to  functional activities normal heel to toe gait in community, ascend and descend 12 steps alternating without increased symptoms or restriction. [] Progressing: [] Met: [] Not Met: [] Adjusted  Overall Progression Towards Functional goals/ Treatment Progress Update:  [] Patient is progressing as expected towards functional goals listed. [] Progression is slowed due to complexities/Impairments listed. [] Progression has been slowed due to co-morbidities. [x] Plan just implemented, too soon to assess goals progression <30days   [] Goals require adjustment due to lack of progress  [] Patient is not progressing as expected and requires additional follow up with physician  [] Other    Prognosis for POC: [x] Good [] Fair  [] Poor      Patient requires continued skilled intervention: [x] Yes  [] No    Treatment/Activity Tolerance:  [x] Patient able to complete treatment  [] Patient limited by fatigue  [] Patient limited by pain     [] Patient limited by other medical complications  [x] Other: No complaints with today's program. Adding CKC quad strengthening through limited range with satisfactory response. Improvement noted in rash across patella today, with minimal irritation present on opposite LE. Continuing to progress strength as appropriate for eventual return to PLOF. Patient Education:  Reviewed diagnosis, POC, HEP and its importance. Access Code: [email or web address]  URL: [email or web address]  Date: 09/09/2022  Prepared by: Wing Zee Exercises  Long Sitting Calf Stretch with Strap - 2-3 x daily - 7 x weekly - 5 reps - 30 hold  Seated Table Hamstring Stretch - 2-3 x daily - 7 x weekly - 5 reps - 30 hold  Long Sitting Quad Set - 2-3 x daily - 7 x weekly - 10 reps - 10 hold  Supine Straight Leg Hip Adduction and Quad Set with Ball - 2-3 x daily - 7 x weekly - 10 reps - 10 hold  Sidelying Hip Abduction - 2-3 x daily - 7 x weekly - 3 sets - 10 reps  Prone Hip Extension on Table - 2-3 x daily - 7 x weekly - 3 sets - 10 reps - 1 hold  Supine Active Straight Leg Raise - 2-3 x daily - 7 x weekly - 3 sets - 10 reps - 1 hold  Standing Hip Flexion AROM - 2-3 x daily - 7 x weekly - 3 sets - 10 reps  Sitting Heel Slide with Towel - 2-3 x daily - 7 x weekly - 10 reps - 10 hold      PLAN:   [x] Continue per plan of care [] Alter current plan (see comments above)  [] Plan of care initiated [] Hold pending MD visit [] Discharge    Progress per surgical restrictions      Electronically signed by:  Stan Workman, PT  Enrrique Scherer, SPT  Note: If patient does not return for scheduled/ recommended follow up visits, this note will serve as a discharge from care along with most recent update on progress.

## 2022-11-01 ENCOUNTER — APPOINTMENT (OUTPATIENT)
Dept: PHYSICAL THERAPY | Age: 37
End: 2022-11-01
Payer: COMMERCIAL

## 2022-11-08 ENCOUNTER — HOSPITAL ENCOUNTER (OUTPATIENT)
Dept: PHYSICAL THERAPY | Age: 37
Setting detail: THERAPIES SERIES
Discharge: HOME | End: 2022-11-08
Payer: COMMERCIAL

## 2022-11-08 PROCEDURE — 97530 THERAPEUTIC ACTIVITIES: CPT | Performed by: PHYSICAL THERAPIST

## 2022-11-08 PROCEDURE — 97110 THERAPEUTIC EXERCISES: CPT | Performed by: PHYSICAL THERAPIST

## 2022-11-08 PROCEDURE — 97016 VASOPNEUMATIC DEVICE THERAPY: CPT | Performed by: PHYSICAL THERAPIST

## 2022-11-08 PROCEDURE — 97112 NEUROMUSCULAR REEDUCATION: CPT | Performed by: PHYSICAL THERAPIST

## 2022-11-08 NOTE — FLOWSHEET NOTE
The [address] [place] [address],[address], [address], [address] [place] [address]  Phone: [phone]   Fax:     [phone]    Physical Therapy Daily Treatment Note  Date:  2022    Patient Name:  Katlin Sheppard    :  1985  MRN: [id number]  Restrictions/Precautions:    Medical/Treatment Diagnosis Information:  Diagnosis: 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 SURGEON:  Bogdan Hawthorne MD     ASSISTANT:  Emily Pedersne MD     ANESTHESIA:  General with adductor canal block. PREOPERATIVE DIAGNOSIS:  Focal cartilage defect, medial facet patella. POSTOPERATIVE DIAGNOSES:  Focal cartilage defect, medial facet patella  with synovitis. Insurance/Certification information:  PT Insurance Information: [employer] auth needed  Physician Information:   Bogdan Hawthorne  Has the plan of care been signed (Y/N):        []  Yes  [x]  No     Date of Patient follow up with Physician: per md      Is this a Progress Report:     []  Yes  [x]  No        If Yes:  Date Range for reporting period:  Beginning  Ending    Progress report will be due (10 Rx or 30 days whichever is less):       Recertification will be due (POC Duration  / 90 days whichever is less):          Visit # Insurance Allowable Auth Required   10 60 per yr   [x]  Yes COHERE []  No        Functional Scale: Foto 21/100    Date assessed:         Latex Allergy:  [x]NO      []YES  Preferred Language for Healthcare:   [x]English       []other:    Pain level:  3/10      SUBJECTIVE:  Pt went out of town for a football game late last week. He was sore and stiff for a few days afterward, which he attributes to the plane ride and increased time on his feet.  Pain levels are back to normal levels today.    OBJECTIVE:     Flexibility L R Comment   Hamstring + mild      Gastroc  + mild       ITB         Quad                                ROM PROM AROM Overpressure Comment     L R L R L R     Flexion 129 (10/14)  Sheet pulls               Extension 0                                                         Strength L R Comment   Quad fair       Hamstring         Gastroc         Hip  flexion         Hip abd                                   Special Test Results/Comment   Meniscal Click     Crepitus     Flexion Test     Valgus Laxity     Varus Laxity     Lachmans     Drop Back     Homans neg               Girth L R   Mid Patella Moderate effusion     Suprapatellar moderate effucion     5cm above       15cm above Atrophy compared to R        Reflexes/Sensation:               [x]Dermatomes/Myotomes intact               []Reflexes equal and normal bilaterally              []Other:     Joint mobility: not assessed               []Normal                       []Hypo              []Hyper     Palpation: general TTP due to recent surgery      Functional Mobility/Transfers: modified I     Posture: WFL for a 30 yr old male      Bandages/Dressings/Incisions: mild raised rash anterior knee     Gait: WBAT, TROM unlocked 0-open° wears when outside of house     Orthopedic Special Tests:     RESTRICTIONS/PRECAUTIONS: PF cartilage transplant, progress to full WB with TROM locked as quad recruitment improves. TROM locked for gait 0-4 weeks. ROM 0-90  0-4 weeks then gradually increase to full by 8 weeks.       Exercises/Interventions:   Exercise/Equipment Resistance/Repetitions Other comments   Stretching     Hamstring [id number]       Inclined Calf 5x30\"    Hip Flexion     ITB     Groin     Quad          Bike recumbent 7'         SLR     Supine 3x10, 10#       Abduction 3x10, 13#    Adduction     EOB-hip ext 3x10, 10#    Clamshells SL  Purple loop 3x10    SLR+ 5x30\"         Isometrics           Patellar Glides     Medial Superior     Inferior          ROM     Sheet Pulls 10x10\"    Hang Weights     Passive     Active     Weight Shift     Ankle Pumps                    CKC     Calf raises  10# DBs  3x10 standing   Wall [email or web address] 5x45\"    Step ups     1 leg stand     Squatting BOSU 3x10    CC TKE     Sidesteps/monster walks purple loop 4 laps each    Balance SL ball toss, airex 3x10    bridges purple loop 3x10         PRE     Extension  RANGE:   Flexion  RANGE:        Quantum machines     Leg press  90# 3x10 60-10    Leg extension     Leg curl DL 0-90 85# 3x10         Manual interventions                Therapeutic Exercise and NMR EXR  [x] (58894) Provided verbal/tactile cueing for activities related to strengthening, flexibility, endurance, ROM for improvements in LE, proximal hip, and core control with self care, mobility, lifting, ambulation. [x] (43681) Provided verbal/tactile cueing for activities related to improving balance, coordination, kinesthetic sense, posture, motor skill, proprioception  to assist with LE, proximal hip, and core control in self care, mobility, lifting, ambulation and eccentric single leg control.      NMR and Therapeutic Activities:    [] (11735 or 00499) Provided verbal/tactile cueing for activities related to improving balance, coordination, kinesthetic sense, posture, motor skill, proprioception and motor activation to allow for proper function of core, proximal hip and LE with self care and ADLs  [] (31799) Gait Re-education- Provided training and instruction to the patient for proper LE, core and proximal hip recruitment and positioning and eccentric body weight control with ambulation re-education including up and down stairs     Home Exercise Program:    [x] (41959) Reviewed/Progressed HEP activities related to strengthening, flexibility, endurance, ROM of core, proximal hip and LE for functional self-care, mobility, lifting and ambulation/stair navigation   [] (52363)Reviewed/Progressed HEP activities related to improving balance, coordination, kinesthetic sense, posture, motor skill, proprioception of core, proximal hip and LE for self care, mobility, lifting, and ambulation/stair navigation      Manual Treatments:  PROM / STM / Oscillations-Mobs:  G-I, II, III, IV (PA's, Inf., Post.)  [] (61402) Provided manual therapy to mobilize LE, proximal hip and/or LS spine soft tissue/joints for the purpose of modulating pain, promoting relaxation,  increasing ROM, reducing/eliminating soft tissue swelling/inflammation/restriction, improving soft tissue extensibility and allowing for proper ROM for normal function with self care, mobility, lifting and ambulation. Modalities:  vaso 10     Charges:  Timed Code Treatment Minutes: 61'   Total Treatment Minutes: 11:36-1:02    68'       [] EVAL (LOW) 71902 (typically 20 minutes face-to-face)  [] EVAL (MOD) 99426 (typically 30 minutes face-to-face)  [] EVAL (HIGH) 38028 (typically 45 minutes face-to-face)  [] RE-EVAL   [x] CP(09221) x 2    [] IONTO  [x] NMR (40944) x  1   [x] VASO  [] Manual (82157) x      [] Other:  [x] TA x  1    [] Mech Traction (45445)  [] ES(attended) (01953)      [] ES (un) (76236):     GOALS:   Patient stated goal: return to light light sports    [] Progressing: [] Met: [] Not Met: [] Adjusted     Therapist goals for Patient:   Short Term Goals: To be achieved in: 2 -4weeks  1. Independent in HEP and progression per patient tolerance, in order to prevent re-injury. [] Progressing: [] Met: [] Not Met: [] Adjusted   2. Patient will have a decrease in pain to facilitate improvement in movement, function, and ADLs as indicated by Functional Deficits. [] Progressing: [] Met: [] Not Met: [] Adjusted     Long Term Goals: To be achieved in: 12 weeks  1. Foto 62/100 to assist with reaching prior level of function. [] Progressing: [] Met: [] Not Met: [] Adjusted  2.  Patient will demonstrate increased AROM to = R to allow for proper joint functioning as indicated by patients Functional Deficits. [] Progressing: [] Met: [] Not Met: [] Adjusted  3. Patient will demonstrate an increase in Strength to good proximal hip strength and control  in LE to allow for proper functional mobility as indicated by patients Functional Deficits. [] Progressing: [] Met: [] Not Met: [] Adjusted  4. Patient will return to  functional activities normal heel to toe gait in community, ascend and descend 12 steps alternating without increased symptoms or restriction. [] Progressing: [] Met: [] Not Met: [] Adjusted  Overall Progression Towards Functional goals/ Treatment Progress Update:  [] Patient is progressing as expected towards functional goals listed. [] Progression is slowed due to complexities/Impairments listed. [] Progression has been slowed due to co-morbidities. [x] Plan just implemented, too soon to assess goals progression <30days   [] Goals require adjustment due to lack of progress  [] Patient is not progressing as expected and requires additional follow up with physician  [] Other    Prognosis for POC: [x] Good [] Fair  [] Poor      Patient requires continued skilled intervention: [x] Yes  [] No    Treatment/Activity Tolerance:  [x] Patient able to complete treatment  [] Patient limited by fatigue  [] Patient limited by pain     [] Patient limited by other medical complications  [x] Other: No complaints with today's program. Progressed proprioceptive exercises on compliant surface with considerable difficulty but satisfactory technique. Pt progressing LE strength and knee ROM at expected rate. Pt would benefit from continued therapy to increase LE strength and endurance for return to PLOF. Patient Education:  Reviewed diagnosis, POC, HEP and its importance. Access Code: [email or web address]  URL: [email or web address]  Date: 09/09/2022  Prepared by: Abhijit Elizabeth     Exercises  Long Sitting Calf Stretch with Strap - 2-3 x daily - 7 x weekly - 5 reps - 30 hold  Seated Table Hamstring Stretch - 2-3 x daily - 7 x weekly - 5 reps - 30 hold  Long Sitting Quad Set - 2-3 x daily - 7 x weekly - 10 reps - 10 hold  Supine Straight Leg Hip Adduction and Quad Set with Ball - 2-3 x daily - 7 x weekly - 10 reps - 10 hold  Sidelying Hip Abduction - 2-3 x daily - 7 x weekly - 3 sets - 10 reps  Prone Hip Extension on Table - 2-3 x daily - 7 x weekly - 3 sets - 10 reps - 1 hold  Supine Active Straight Leg Raise - 2-3 x daily - 7 x weekly - 3 sets - 10 reps - 1 hold  Standing Hip Flexion AROM - 2-3 x daily - 7 x weekly - 3 sets - 10 reps  Sitting Heel Slide with Towel - 2-3 x daily - 7 x weekly - 10 reps - 10 hold      PLAN:   [x] Continue per plan of care [] Alter current plan (see comments above)  [] Plan of care initiated [] Hold pending MD visit [] Discharge    Progress per surgical restrictions      Electronically signed by:  Temi Valero PT  Bebo Baltazar, SPT  Note: If patient does not return for scheduled/ recommended follow up visits, this note will serve as a discharge from care along with most recent update on progress.

## 2022-11-10 ENCOUNTER — HOSPITAL ENCOUNTER (OUTPATIENT)
Dept: PHYSICAL THERAPY | Age: 37
Setting detail: THERAPIES SERIES
Discharge: HOME | End: 2022-11-10
Payer: COMMERCIAL

## 2022-11-10 PROCEDURE — 97530 THERAPEUTIC ACTIVITIES: CPT | Performed by: PHYSICAL THERAPIST

## 2022-11-10 PROCEDURE — 97110 THERAPEUTIC EXERCISES: CPT | Performed by: PHYSICAL THERAPIST

## 2022-11-10 PROCEDURE — 97112 NEUROMUSCULAR REEDUCATION: CPT | Performed by: PHYSICAL THERAPIST

## 2022-11-10 PROCEDURE — 97016 VASOPNEUMATIC DEVICE THERAPY: CPT | Performed by: PHYSICAL THERAPIST

## 2022-11-10 NOTE — FLOWSHEET NOTE
[facility], [address] [place] [address], [address] [place] [address]  Phone: [phone]   Fax:     [phone]    Physical Therapy Daily Treatment Note  Date:  11/10/2022    Patient Name:  Queen Nagi    :  1985  MRN: [id number]  Restrictions/Precautions:    Medical/Treatment Diagnosis Information:  Diagnosis: 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 SURGEON:  Alice Holliday MD     ASSISTANT:  Ramya Calabrese MD     ANESTHESIA:  General with adductor canal block. PREOPERATIVE DIAGNOSIS:  Focal cartilage defect, medial facet patella. POSTOPERATIVE DIAGNOSES:  Focal cartilage defect, medial facet patella  with synovitis. Insurance/Certification information:  PT Insurance Information: [employer] auth needed  Physician Information:   Alice Holliday  Has the plan of care been signed (Y/N):        []  Yes  [x]  No     Date of Patient follow up with Physician: per md      Is this a Progress Report:     []  Yes  [x]  No        If Yes:  Date Range for reporting period:  Beginning  Ending    Progress report will be due (10 Rx or 30 days whichever is less):       Recertification will be due (POC Duration  / 90 days whichever is less):          Visit # Insurance Allowable Auth Required   11 60 per yr   [x]  Yes COHERE []  No        Functional Scale: Foto 21/100    Date assessed:         Latex Allergy:  [x]NO      []YES  Preferred Language for Healthcare:   [x]English       []other:    Pain level:  3/10      SUBJECTIVE:  Knee is feeling better today. Pt is out of medicine for the rash on his knee, which is starting to come back. He plans to reach out to get his prescription refilled.  Lifting his leg with his knee bent is somewhat painful, but is fine with the knee straight. OBJECTIVE:     Flexibility L R Comment   Hamstring + mild      Gastroc  + mild       ITB         Quad                                  ROM PROM AROM Overpressure Comment     L R L R L R     Flexion 129 (10/14)  Sheet pulls               Extension 0                                                         Strength L R Comment   Quad fair       Hamstring         Gastroc         Hip  flexion         Hip abd                                   Special Test Results/Comment   Meniscal Click     Crepitus     Flexion Test     Valgus Laxity     Varus Laxity     Lachmans     Drop Back     Homans neg               Girth L R   Mid Patella Moderate effusion     Suprapatellar moderate effucion     5cm above       15cm above Atrophy compared to R        Reflexes/Sensation:               [x]Dermatomes/Myotomes intact               []Reflexes equal and normal bilaterally              []Other:     Joint mobility: not assessed               []Normal                       []Hypo              []Hyper     Palpation: general TTP due to recent surgery      Functional Mobility/Transfers: modified I     Posture: WFL for a 30 yr old male      Bandages/Dressings/Incisions: mild raised rash anterior knee     Gait: WBAT, TROM unlocked 0-open° wears when outside of house     Orthopedic Special Tests:     RESTRICTIONS/PRECAUTIONS: PF cartilage transplant, progress to full WB with TROM locked as quad recruitment improves. TROM locked for gait 0-4 weeks. ROM 0-90  0-4 weeks then gradually increase to full by 8 weeks.       Exercises/Interventions:   Exercise/Equipment Resistance/Repetitions Other comments   Stretching     Hamstring [id number]       Inclined Calf 5x30\"    Hip Flexion     ITB     Groin     Quad          Bike recumbent 7'         SLR     Supine 3x10, 10#       Abduction 3x10, 13#    Adduction     EOB-hip ext 3x10, 10#    Clamshells SL  grey loop 3x10    SLR+ 5x30\"         Isometrics Patellar Glides     Medial     Superior     Inferior          ROM     Sheet Pulls 10x10\"    Hang Weights     Passive     Active     Weight Shift     Ankle Pumps                    CKC     Calf raises  10# DBs  3x10 standing   Wall [email or web address] 5x45\"    Step ups     1 leg stand     Squatting BOSU 3x10    CC TKE     Sidesteps/monster walks grey loop 4 laps each    Balance Biodex maze lvl 5, 3'    bridges grey loop 3x10    Ball toss Single leg on airex x30    PRE     Extension  RANGE:   Flexion  RANGE:        Quantum machines     Leg press  135# 3x10 60-10    Leg extension     Leg curl DL 0-90 90# 3x10         Manual interventions                Therapeutic Exercise and NMR EXR  [x] (31351) Provided verbal/tactile cueing for activities related to strengthening, flexibility, endurance, ROM for improvements in LE, proximal hip, and core control with self care, mobility, lifting, ambulation. [x] (33726) Provided verbal/tactile cueing for activities related to improving balance, coordination, kinesthetic sense, posture, motor skill, proprioception  to assist with LE, proximal hip, and core control in self care, mobility, lifting, ambulation and eccentric single leg control.      NMR and Therapeutic Activities:    [] (70241 or 68311) Provided verbal/tactile cueing for activities related to improving balance, coordination, kinesthetic sense, posture, motor skill, proprioception and motor activation to allow for proper function of core, proximal hip and LE with self care and ADLs  [] (14409) Gait Re-education- Provided training and instruction to the patient for proper LE, core and proximal hip recruitment and positioning and eccentric body weight control with ambulation re-education including up and down stairs     Home Exercise Program:    [x] (44472) Reviewed/Progressed HEP activities related to strengthening, flexibility, endurance, ROM of core, proximal hip and LE for functional self-care, mobility, lifting and ambulation/stair navigation   [] (17468)Reviewed/Progressed HEP activities related to improving balance, coordination, kinesthetic sense, posture, motor skill, proprioception of core, proximal hip and LE for self care, mobility, lifting, and ambulation/stair navigation      Manual Treatments:  PROM / STM / Oscillations-Mobs:  G-I, II, III, IV (PA's, Inf., Post.)  [] (04733) Provided manual therapy to mobilize LE, proximal hip and/or LS spine soft tissue/joints for the purpose of modulating pain, promoting relaxation,  increasing ROM, reducing/eliminating soft tissue swelling/inflammation/restriction, improving soft tissue extensibility and allowing for proper ROM for normal function with self care, mobility, lifting and ambulation. Modalities:  vaso 10     Charges:  Timed Code Treatment Minutes: 61'   Total Treatment Minutes: 4:02-5:23    80'       [] EVAL (LOW) 52802 (typically 20 minutes face-to-face)  [] EVAL (MOD) 22494 (typically 30 minutes face-to-face)  [] EVAL (HIGH) 38728 (typically 45 minutes face-to-face)  [] RE-EVAL   [x] XI(97854) x 2    [] IONTO  [x] NMR (19479) x  1   [x] VASO  [] Manual (17383) x      [] Other:  [x] TA x  1    [] Mech Traction (16342)  [] ES(attended) (03635)      [] ES (un) (82442):     GOALS:   Patient stated goal: return to light light sports    [] Progressing: [] Met: [] Not Met: [] Adjusted     Therapist goals for Patient:   Short Term Goals: To be achieved in: 2 -4weeks  1. Independent in HEP and progression per patient tolerance, in order to prevent re-injury. [] Progressing: [] Met: [] Not Met: [] Adjusted   2. Patient will have a decrease in pain to facilitate improvement in movement, function, and ADLs as indicated by Functional Deficits. [] Progressing: [] Met: [] Not Met: [] Adjusted     Long Term Goals: To be achieved in: 12 weeks  1. Foto 62/100 to assist with reaching prior level of function. [] Progressing: [] Met: [] Not Met: [] Adjusted  2.  Patient will demonstrate increased AROM to = R to allow for proper joint functioning as indicated by patients Functional Deficits. [] Progressing: [] Met: [] Not Met: [] Adjusted  3. Patient will demonstrate an increase in Strength to good proximal hip strength and control  in LE to allow for proper functional mobility as indicated by patients Functional Deficits. [] Progressing: [] Met: [] Not Met: [] Adjusted  4. Patient will return to  functional activities normal heel to toe gait in community, ascend and descend 12 steps alternating without increased symptoms or restriction. [] Progressing: [] Met: [] Not Met: [] Adjusted      Overall Progression Towards Functional goals/ Treatment Progress Update:  [] Patient is progressing as expected towards functional goals listed. [] Progression is slowed due to complexities/Impairments listed. [] Progression has been slowed due to co-morbidities. [x] Plan just implemented, too soon to assess goals progression <30days   [] Goals require adjustment due to lack of progress  [] Patient is not progressing as expected and requires additional follow up with physician  [] Other    Prognosis for POC: [x] Good [] Fair  [] Poor      Patient requires continued skilled intervention: [x] Yes  [] No    Treatment/Activity Tolerance:  [x] Patient able to complete treatment  [] Patient limited by fatigue  [] Patient limited by pain     [] Patient limited by other medical complications  [x] Other: No complaints with today's program. Pt considerably fatigued with today's progressions. Noted deficits in quad and hip strength, but pt progressing as expected. Continuing to address proprioceptive deficits and increase muscular strength/endurance for return to PLOF. Patient Education:  Reviewed diagnosis, POC, HEP and its importance. Access Code: [email or web address]  URL: [email or web address]  Date: 09/09/2022  Prepared by: Wing Zee     Exercises  Long Sitting Calf Stretch with Strap - 2-3 x daily - 7 x weekly - 5 reps - 30 hold  Seated Table Hamstring Stretch - 2-3 x daily - 7 x weekly - 5 reps - 30 hold  Long Sitting Quad Set - 2-3 x daily - 7 x weekly - 10 reps - 10 hold  Supine Straight Leg Hip Adduction and Quad Set with Ball - 2-3 x daily - 7 x weekly - 10 reps - 10 hold  Sidelying Hip Abduction - 2-3 x daily - 7 x weekly - 3 sets - 10 reps  Prone Hip Extension on Table - 2-3 x daily - 7 x weekly - 3 sets - 10 reps - 1 hold  Supine Active Straight Leg Raise - 2-3 x daily - 7 x weekly - 3 sets - 10 reps - 1 hold  Standing Hip Flexion AROM - 2-3 x daily - 7 x weekly - 3 sets - 10 reps  Sitting Heel Slide with Towel - 2-3 x daily - 7 x weekly - 10 reps - 10 hold      PLAN:   [x] Continue per plan of care [] Alter current plan (see comments above)  [] Plan of care initiated [] Hold pending MD visit [] Discharge    Progress per surgical restrictions      Electronically signed by:  Arlene Owens, PT  Zenaida Wahl, SPT  Note: If patient does not return for scheduled/ recommended follow up visits, this note will serve as a discharge from care along with most recent update on progress.

## 2022-11-14 ENCOUNTER — HOSPITAL ENCOUNTER (OUTPATIENT)
Dept: PHYSICAL THERAPY | Age: 37
Setting detail: THERAPIES SERIES
Discharge: HOME | End: 2022-11-14
Payer: COMMERCIAL

## 2022-11-14 PROCEDURE — 97112 NEUROMUSCULAR REEDUCATION: CPT | Performed by: PHYSICAL THERAPIST

## 2022-11-14 PROCEDURE — 97110 THERAPEUTIC EXERCISES: CPT | Performed by: PHYSICAL THERAPIST

## 2022-11-14 PROCEDURE — 97530 THERAPEUTIC ACTIVITIES: CPT | Performed by: PHYSICAL THERAPIST

## 2022-11-14 NOTE — FLOWSHEET NOTE
The [address] [place] [address],[address], [address], [address] [place] [address]  Phone: [phone]   Fax:     [phone]    Physical Therapy Daily Treatment Note  Date:  2022    Patient Name:  Ignacio Olson    :  1985  MRN: [id number]  Restrictions/Precautions:    Medical/Treatment Diagnosis Information:  Diagnosis: 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 SURGEON:  Edward Slaughter MD     ASSISTANT:  Porsha Rasmussen MD     ANESTHESIA:  General with adductor canal block. PREOPERATIVE DIAGNOSIS:  Focal cartilage defect, medial facet patella. POSTOPERATIVE DIAGNOSES:  Focal cartilage defect, medial facet patella  with synovitis. Insurance/Certification information:  PT Insurance Information: [employer] auth needed  Physician Information:   Edward Slaughter  Has the plan of care been signed (Y/N):        []  Yes  [x]  No     Date of Patient follow up with Physician: per md      Is this a Progress Report:     []  Yes  [x]  No        If Yes:  Date Range for reporting period:  Beginning  Ending    Progress report will be due (10 Rx or 30 days whichever is less): 15/4      Recertification will be due (POC Duration  / 90 days whichever is less):          Visit # Insurance Allowable Auth Required   12 60 per yr   [x]  Yes COHERE []  No        Functional Scale: Foto 21/100    Date assessed:         Latex Allergy:  [x]NO      []YES  Preferred Language for Healthcare:   [x]English       []other:    Pain level:  3/10      SUBJECTIVE:  Reports that his knee is doing better. Reports that he is still having difficulty managing stairs. Rash is still present, however seems to be a little better.       OBJECTIVE:     Flexibility L R Comment Hamstring + mild      Gastroc  + mild       ITB         Quad                                  ROM PROM AROM Overpressure Comment     L R L R L R     Flexion 129 (10/14)  Sheet pulls               Extension 0                                                         Strength L R Comment   Quad fair       Hamstring         Gastroc         Hip  flexion         Hip abd                                   Special Test Results/Comment   Meniscal Click     Crepitus     Flexion Test     Valgus Laxity     Varus Laxity     Lachmans     Drop Back     Homans neg               Girth L R   Mid Patella Moderate effusion     Suprapatellar moderate effucion     5cm above       15cm above Atrophy compared to R        Reflexes/Sensation:               [x]Dermatomes/Myotomes intact               []Reflexes equal and normal bilaterally              []Other:     Joint mobility: not assessed               []Normal                       []Hypo              []Hyper     Palpation: general TTP due to recent surgery      Functional Mobility/Transfers: modified I     Posture: WFL for a 30 yr old male      Bandages/Dressings/Incisions: mild raised rash anterior knee     Gait: WBAT, TROM unlocked 0-open° wears when outside of house     Orthopedic Special Tests:     RESTRICTIONS/PRECAUTIONS: PF cartilage transplant, progress to full WB with TROM locked as quad recruitment improves. TROM locked for gait 0-4 weeks. ROM 0-90  0-4 weeks then gradually increase to full by 8 weeks.       Exercises/Interventions:   Exercise/Equipment Resistance/Repetitions Other comments   Stretching     Hamstring [id number]       Inclined Calf 5x30\"    Hip Flexion     ITB     Groin     Quad          Bike recumbent 7'         SLR     Supine 3x10, 10#       Abduction 3x10, 13#    Adduction     EOB-hip ext 3x10, 10#    Clamshells SL  grey loop 3x10    SLR+ 5x30\"         Isometrics           Patellar Glides     Medial     Superior     Inferior          ROM     Sheet Pulls 10x10\"    Hang Weights     Passive     Active     Weight Shift     Ankle Pumps                    CKC     Calf raises  10# DBs  3x10 standing   Wall [email or web address] 5x45\"    Step ups     1 leg stand     Squatting BOSU 3x10    CC TKE     Sidesteps/monster walks grey loop 4 laps each    Balance [employer] maze lvl 5, 3'    bridges grey loop 3x10    Ball toss Single leg on airex x30    PRE     Extension  RANGE:   Flexion  RANGE:        Quantum machines     Leg press  135# 3x10 60-10    Leg extension     Leg curl DL 0-90 90# 3x10         Manual interventions                Therapeutic Exercise and NMR EXR  [x] (51481) Provided verbal/tactile cueing for activities related to strengthening, flexibility, endurance, ROM for improvements in LE, proximal hip, and core control with self care, mobility, lifting, ambulation. [x] (86094) Provided verbal/tactile cueing for activities related to improving balance, coordination, kinesthetic sense, posture, motor skill, proprioception  to assist with LE, proximal hip, and core control in self care, mobility, lifting, ambulation and eccentric single leg control.      NMR and Therapeutic Activities:    [] (60783 or 36604) Provided verbal/tactile cueing for activities related to improving balance, coordination, kinesthetic sense, posture, motor skill, proprioception and motor activation to allow for proper function of core, proximal hip and LE with self care and ADLs  [] (25097) Gait Re-education- Provided training and instruction to the patient for proper LE, core and proximal hip recruitment and positioning and eccentric body weight control with ambulation re-education including up and down stairs     Home Exercise Program:    [x] (49390) Reviewed/Progressed HEP activities related to strengthening, flexibility, endurance, ROM of core, proximal hip and LE for functional self-care, mobility, lifting and ambulation/stair navigation   [] (42407)Reviewed/Progressed HEP activities related to improving balance, coordination, kinesthetic sense, posture, motor skill, proprioception of core, proximal hip and LE for self care, mobility, lifting, and ambulation/stair navigation      Manual Treatments:  PROM / STM / Oscillations-Mobs:  G-I, II, III, IV (PA's, Inf., Post.)  [] (64988) Provided manual therapy to mobilize LE, proximal hip and/or LS spine soft tissue/joints for the purpose of modulating pain, promoting relaxation,  increasing ROM, reducing/eliminating soft tissue swelling/inflammation/restriction, improving soft tissue extensibility and allowing for proper ROM for normal function with self care, mobility, lifting and ambulation. Modalities:  vaso 10 held 11/4    Charges:  Timed Code Treatment Minutes: 48'   Total Treatment Minutes: 2:46-5:35  32'       [] EVAL (LOW) 84750 (typically 20 minutes face-to-face)  [] EVAL (MOD) 11227 (typically 30 minutes face-to-face)  [] EVAL (HIGH) 93371 (typically 45 minutes face-to-face)  [] RE-EVAL   [x] IV(93993) x 2    [] IONTO  [x] NMR (41304) x  1   [] VASO  [] Manual (08164) x      [] Other:  [x] TA x  1    [] Mech Traction (88827)  [] ES(attended) (80356)      [] ES (un) (03216):     GOALS:   Patient stated goal: return to light light sports    [] Progressing: [] Met: [] Not Met: [] Adjusted     Therapist goals for Patient:   Short Term Goals: To be achieved in: 2 -4weeks  1. Independent in HEP and progression per patient tolerance, in order to prevent re-injury. [] Progressing: [] Met: [] Not Met: [] Adjusted   2. Patient will have a decrease in pain to facilitate improvement in movement, function, and ADLs as indicated by Functional Deficits. [] Progressing: [] Met: [] Not Met: [] Adjusted     Long Term Goals: To be achieved in: 12 weeks  1. Foto 62/100 to assist with reaching prior level of function. [] Progressing: [] Met: [] Not Met: [] Adjusted  2.  Patient will demonstrate increased AROM to = R to allow for proper joint functioning as indicated by patients Functional Deficits. [] Progressing: [] Met: [] Not Met: [] Adjusted  3. Patient will demonstrate an increase in Strength to good proximal hip strength and control  in LE to allow for proper functional mobility as indicated by patients Functional Deficits. [] Progressing: [] Met: [] Not Met: [] Adjusted  4. Patient will return to  functional activities normal heel to toe gait in community, ascend and descend 12 steps alternating without increased symptoms or restriction. [] Progressing: [] Met: [] Not Met: [] Adjusted      Overall Progression Towards Functional goals/ Treatment Progress Update:  [] Patient is progressing as expected towards functional goals listed. [] Progression is slowed due to complexities/Impairments listed. [] Progression has been slowed due to co-morbidities. [x] Plan just implemented, too soon to assess goals progression <30days   [] Goals require adjustment due to lack of progress  [] Patient is not progressing as expected and requires additional follow up with physician  [] Other    Prognosis for POC: [x] Good [] Fair  [] Poor      Patient requires continued skilled intervention: [x] Yes  [] No    Treatment/Activity Tolerance:  [x] Patient able to complete treatment  [] Patient limited by fatigue  [] Patient limited by pain     [] Patient limited by other medical complications  [x] Other: No complaints with today's program. Program was limited by pt time constraints today. We held on gameready today. Squat level with wall sits continues to be limited secondary to discomfort with increased depth of squat. Strength deficits are still present, however this is improving. Patient Education:  Reviewed diagnosis, POC, HEP and its importance. Access Code: [email or web address]  URL: [email or web address]  Date: 09/09/2022  Prepared by: Sayra Vidal     Exercises  Long Sitting Calf Stretch with Strap - 2-3 x daily - 7 x weekly - 5 reps - 30 hold  Seated Table Hamstring Stretch - 2-3 x daily - 7 x weekly - 5 reps - 30 hold  Long Sitting Quad Set - 2-3 x daily - 7 x weekly - 10 reps - 10 hold  Supine Straight Leg Hip Adduction and Quad Set with Ball - 2-3 x daily - 7 x weekly - 10 reps - 10 hold  Sidelying Hip Abduction - 2-3 x daily - 7 x weekly - 3 sets - 10 reps  Prone Hip Extension on Table - 2-3 x daily - 7 x weekly - 3 sets - 10 reps - 1 hold  Supine Active Straight Leg Raise - 2-3 x daily - 7 x weekly - 3 sets - 10 reps - 1 hold  Standing Hip Flexion AROM - 2-3 x daily - 7 x weekly - 3 sets - 10 reps  Sitting Heel Slide with Towel - 2-3 x daily - 7 x weekly - 10 reps - 10 hold      PLAN:   [x] Continue per plan of care [] Alter current plan (see comments above)  [] Plan of care initiated [] Hold pending MD visit [] Discharge    Progress per surgical restrictions      Electronically signed by:  Samy Doshi PT  Max Morfin, SPT  Note: If patient does not return for scheduled/ recommended follow up visits, this note will serve as a discharge from care along with most recent update on progress.

## 2022-11-17 ENCOUNTER — HOSPITAL ENCOUNTER (OUTPATIENT)
Dept: PHYSICAL THERAPY | Age: 37
Setting detail: THERAPIES SERIES
Discharge: HOME | End: 2022-11-17
Payer: COMMERCIAL

## 2022-11-17 PROCEDURE — 97110 THERAPEUTIC EXERCISES: CPT | Performed by: PHYSICAL THERAPIST

## 2022-11-17 PROCEDURE — 97112 NEUROMUSCULAR REEDUCATION: CPT | Performed by: PHYSICAL THERAPIST

## 2022-11-17 PROCEDURE — 97530 THERAPEUTIC ACTIVITIES: CPT | Performed by: PHYSICAL THERAPIST

## 2022-11-17 NOTE — FLOWSHEET NOTE
The [address] [place] [address],[address], [address], [address] [place] [address]  Phone: [phone]   Fax:     [phone]    Physical Therapy Daily Treatment Note  Date:  2022    Patient Name:  Shanell Faith    :  1985  MRN: [id number]  Restrictions/Precautions:    Medical/Treatment Diagnosis Information:  Diagnosis: 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 SURGEON:  Edwige Adames MD     ASSISTANT:  Zion Alicea MD     ANESTHESIA:  General with adductor canal block. PREOPERATIVE DIAGNOSIS:  Focal cartilage defect, medial facet patella. POSTOPERATIVE DIAGNOSES:  Focal cartilage defect, medial facet patella  with synovitis. Insurance/Certification information:  PT Insurance Information: [employer] Cohere auth needed  Physician Information:   Edwige Adames  Has the plan of care been signed (Y/N):        []  Yes  [x]  No     Date of Patient follow up with Physician: per md      Is this a Progress Report:     []  Yes  [x]  No        If Yes:  Date Range for reporting period:  Beginning  Ending    Progress report will be due (10 Rx or 30 days whichever is less): 60/0      Recertification will be due (POC Duration  / 90 days whichever is less):          Visit # Insurance Allowable Auth Required   13 60 per yr   [x]  Yes COHERE []  No        Functional Scale: Foto 21/100    Date assessed:         Latex Allergy:  [x]NO      []YES  Preferred Language for Healthcare:   [x]English       []other:    Pain level:  3/10      SUBJECTIVE:  11.5 weeks. Reports that his knee is doing better. Reports that he is still having difficulty managing stairs, however he feels like this is improving.       OBJECTIVE:     Flexibility L R Comment   Hamstring + [email or web address] 5x45\"    Step ups     1 leg stand     Squatting BOSU 3x10    CC TKE     Sidesteps/monster walks grey loop 4 laps each    Balance Biodex maze lvl 5, 3'    bridges Grey/blue loop 3x10    Ball toss Single leg on airex x30    PRE     Extension LAQ 5lbs 3x10 RANGE: 90-40   Flexion  RANGE:        Quantum machines     Leg press ecc 90# 3x10 60-10    Leg extension     Leg curl ecc 0-90 70# 3x10         Manual interventions                Therapeutic Exercise and NMR EXR  [x] (11581) Provided verbal/tactile cueing for activities related to strengthening, flexibility, endurance, ROM for improvements in LE, proximal hip, and core control with self care, mobility, lifting, ambulation. [x] (08976) Provided verbal/tactile cueing for activities related to improving balance, coordination, kinesthetic sense, posture, motor skill, proprioception  to assist with LE, proximal hip, and core control in self care, mobility, lifting, ambulation and eccentric single leg control.      NMR and Therapeutic Activities:    [] (91411 or 96123) Provided verbal/tactile cueing for activities related to improving balance, coordination, kinesthetic sense, posture, motor skill, proprioception and motor activation to allow for proper function of core, proximal hip and LE with self care and ADLs  [] (09224) Gait Re-education- Provided training and instruction to the patient for proper LE, core and proximal hip recruitment and positioning and eccentric body weight control with ambulation re-education including up and down stairs     Home Exercise Program:    [x] (97465) Reviewed/Progressed HEP activities related to strengthening, flexibility, endurance, ROM of core, proximal hip and LE for functional self-care, mobility, lifting and ambulation/stair navigation   [] (65322)Reviewed/Progressed HEP activities related to improving balance, coordination, kinesthetic sense, posture, motor skill, proprioception of core, proximal hip and LE for self care, mobility, lifting, and ambulation/stair navigation      Manual Treatments:  PROM / STM / Oscillations-Mobs:  G-I, II, III, IV (PA's, Inf., Post.)  [] (04243) Provided manual therapy to mobilize LE, proximal hip and/or LS spine soft tissue/joints for the purpose of modulating pain, promoting relaxation,  increasing ROM, reducing/eliminating soft tissue swelling/inflammation/restriction, improving soft tissue extensibility and allowing for proper ROM for normal function with self care, mobility, lifting and ambulation. Modalities:  vaso 10 held 11/4    Charges:  Timed Code Treatment Minutes: 48'   Total Treatment Minutes: 4:50-5:58  76'       [] EVAL (LOW) 67860 (typically 20 minutes face-to-face)  [] EVAL (MOD) 66091 (typically 30 minutes face-to-face)  [] EVAL (HIGH) 45894 (typically 45 minutes face-to-face)  [] RE-EVAL   [x] UO(05235) x 2    [] IONTO  [x] NMR (12316) x  1   [] VASO  [] Manual (44666) x      [] Other:  [x] TA x  1    [] Mech Traction (99063)  [] ES(attended) (60851)      [] ES (un) (94747):     GOALS:   Patient stated goal: return to light light sports    [] Progressing: [] Met: [] Not Met: [] Adjusted     Therapist goals for Patient:   Short Term Goals: To be achieved in: 2 -4weeks  1. Independent in HEP and progression per patient tolerance, in order to prevent re-injury. [] Progressing: [] Met: [] Not Met: [] Adjusted   2. Patient will have a decrease in pain to facilitate improvement in movement, function, and ADLs as indicated by Functional Deficits. [] Progressing: [] Met: [] Not Met: [] Adjusted     Long Term Goals: To be achieved in: 12 weeks  1. Foto 62/100 to assist with reaching prior level of function. [] Progressing: [] Met: [] Not Met: [] Adjusted  2. Patient will demonstrate increased AROM to = R to allow for proper joint functioning as indicated by patients Functional Deficits. [] Progressing: [] Met: [] Not Met: [] Adjusted  3.  Patient will demonstrate an increase in Strength to good proximal hip strength and control  in LE to allow for proper functional mobility as indicated by patients Functional Deficits. [] Progressing: [] Met: [] Not Met: [] Adjusted  4. Patient will return to  functional activities normal heel to toe gait in community, ascend and descend 12 steps alternating without increased symptoms or restriction. [] Progressing: [] Met: [] Not Met: [] Adjusted      Overall Progression Towards Functional goals/ Treatment Progress Update:  [x] Patient is progressing as expected towards functional goals listed. [] Progression is slowed due to complexities/Impairments listed. [] Progression has been slowed due to co-morbidities. [] Plan just implemented, too soon to assess goals progression <30days   [] Goals require adjustment due to lack of progress  [] Patient is not progressing as expected and requires additional follow up with physician  [] Other    Prognosis for POC: [x] Good [] Fair  [] Poor      Patient requires continued skilled intervention: [x] Yes  [] No    Treatment/Activity Tolerance:  [x] Patient able to complete treatment  [] Patient limited by fatigue  [] Patient limited by pain     [] Patient limited by other medical complications  [x] Other: No complaints with today's program. Program was limited by pt time constraints today. We held on gameready today. Squat level with wall sits continues to be limited secondary to discomfort with increased depth of squat. We did progress Nautilus exercises to eccentrics and this went well. Pt tolerated LAQ within restricted ranges well. Requires continued focus on quad strength. Patient Education:  Reviewed diagnosis, POC, HEP and its importance. Access Code: [email or web address]  URL: [email or web address]  Date: 09/09/2022  Prepared by: Partha Hare     Exercises  Long Sitting Calf Stretch with Strap - 2-3 x daily - 7 x weekly - 5 reps - 30 hold  Seated Table Hamstring Stretch - 2-3 x daily - 7 x weekly - 5 reps - 30 hold  Long Sitting Quad Set - 2-3 x daily - 7 x weekly - 10 reps - 10 hold  Supine Straight Leg Hip Adduction and Quad Set with Ball - 2-3 x daily - 7 x weekly - 10 reps - 10 hold  Sidelying Hip Abduction - 2-3 x daily - 7 x weekly - 3 sets - 10 reps  Prone Hip Extension on Table - 2-3 x daily - 7 x weekly - 3 sets - 10 reps - 1 hold  Supine Active Straight Leg Raise - 2-3 x daily - 7 x weekly - 3 sets - 10 reps - 1 hold  Standing Hip Flexion AROM - 2-3 x daily - 7 x weekly - 3 sets - 10 reps  Sitting Heel Slide with Towel - 2-3 x daily - 7 x weekly - 10 reps - 10 hold      PLAN:   [x] Continue per plan of care [] Alter current plan (see comments above)  [] Plan of care initiated [] Hold pending MD visit [] Discharge    Progress per surgical restrictions      Electronically signed by:  Shannon Rodriguez PT    Note: If patient does not return for scheduled/ recommended follow up visits, this note will serve as a discharge from care along with most recent update on progress.

## 2022-11-21 ENCOUNTER — HOSPITAL ENCOUNTER (OUTPATIENT)
Dept: PHYSICAL THERAPY | Age: 37
Setting detail: THERAPIES SERIES
Discharge: HOME | End: 2022-11-21
Payer: COMMERCIAL

## 2022-11-21 PROCEDURE — 97112 NEUROMUSCULAR REEDUCATION: CPT | Performed by: PHYSICAL THERAPIST

## 2022-11-21 PROCEDURE — 97110 THERAPEUTIC EXERCISES: CPT | Performed by: PHYSICAL THERAPIST

## 2022-11-21 PROCEDURE — 97530 THERAPEUTIC ACTIVITIES: CPT | Performed by: PHYSICAL THERAPIST

## 2022-11-21 NOTE — FLOWSHEET NOTE
The [facility], [address] [place] [address], [address] [place] [address]  Phone: [phone]   Fax:     [phone]    Physical Therapy Daily Treatment Note  Date:  2022    Patient Name:  Ciro Townsend    :  1985  MRN: [id number]  Restrictions/Precautions:    Medical/Treatment Diagnosis Information:  Diagnosis: 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 SURGEON:  Tea Glaser MD     ASSISTANT:  Val Caldera MD     ANESTHESIA:  General with adductor canal block. PREOPERATIVE DIAGNOSIS:  Focal cartilage defect, medial facet patella. POSTOPERATIVE DIAGNOSES:  Focal cartilage defect, medial facet patella  with synovitis. Insurance/Certification information:  PT Insurance Information: [employer]here auth needed  Physician Information:   Tea Glaser  Has the plan of care been signed (Y/N):        []  Yes  [x]  No     Date of Patient follow up with Physician: per md      Is this a Progress Report:     []  Yes  [x]  No        If Yes:  Date Range for reporting period:  Beginning  Ending    Progress report will be due (10 Rx or 30 days whichever is less):       Recertification will be due (POC Duration  / 90 days whichever is less):          Visit # Insurance Allowable Auth Required    approved 60 per yr   [x]  Yes COHERE []  No        Functional Scale: Foto /    Date assessed:         Latex Allergy:  [x]NO      []YES  Preferred Language for Healthcare:   [x]English       []other:    Pain level:  3/10      SUBJECTIVE:  12 weeks post op. Reports that going up stairs is getting easier. He still has discomfort and is challenged by going down stairs.         OBJECTIVE:     Flexibility L R Comment   Hamstring + mild Gastroc  + mild       ITB         Quad                                  ROM PROM AROM Overpressure Comment     L R L R L R     Flexion 129 (10/14)  Sheet pulls               Extension 0                                                         Strength L R Comment   Quad fair       Hamstring         Gastroc         Hip  flexion         Hip abd                                   Special Test Results/Comment   Meniscal Click     Crepitus     Flexion Test     Valgus Laxity     Varus Laxity     Lachmans     Drop Back     Homans neg                 Reflexes/Sensation:               [x]Dermatomes/Myotomes intact               []Reflexes equal and normal bilaterally              []Other:     Joint mobility: not assessed               []Normal                       []Hypo              []Hyper     Palpation: general TTP due to recent surgery      Functional Mobility/Transfers: modified I     Posture: WFL for a 30 yr old male      Bandages/Dressings/Incisions: mild raised rash anterior knee     Gait: WBAT, TROM unlocked 0-open° wears when outside of house     Orthopedic Special Tests:     RESTRICTIONS/PRECAUTIONS: PF cartilage transplant, progress to full WB with TROM locked as quad recruitment improves. TROM locked for gait 0-4 weeks. ROM 0-90  0-4 weeks then gradually increase to full by 8 weeks.       Exercises/Interventions:   Exercise/Equipment Resistance/Repetitions Other comments   Stretching     Hamstring [id number]       Inclined Calf 5x30\"    Hip Flexion     ITB     Groin     Quad          Bike upright 7'         SLR           Adduction           SLR+ 5x30\"         Isometrics           Patellar Glides     Medial     Superior     Inferior          ROM     Sheet Pulls 10x10\"    Hang Weights     Passive     Active     Weight Shift     Ankle Pumps                    CKC     Calf raises  SL  3x10 standing   Wall sits w/ trunk flexion 5x45\"    Step ups 4\" 3x10    1 leg stand     Squatting BOSU 3x10    CC TKE 80lbs 3x10 Sidesteps/monster walks purple loop 4 laps each    Balance Biodex maze lvl 5, 3'    bridges Grey/blue loop 3x10    Damon maría 1x10    PRE     Extension LAQ 5lbs 3x10 RANGE: 90-40   Flexion  RANGE:        Quantum machines     Leg press ecc 95# 3x10 60-10    Leg extension     Leg curl ecc 0-90 80# 3x10         Manual interventions                Therapeutic Exercise and NMR EXR  [x] (09386) Provided verbal/tactile cueing for activities related to strengthening, flexibility, endurance, ROM for improvements in LE, proximal hip, and core control with self care, mobility, lifting, ambulation. [x] (25425) Provided verbal/tactile cueing for activities related to improving balance, coordination, kinesthetic sense, posture, motor skill, proprioception  to assist with LE, proximal hip, and core control in self care, mobility, lifting, ambulation and eccentric single leg control.      NMR and Therapeutic Activities:    [] (45172 or 18924) Provided verbal/tactile cueing for activities related to improving balance, coordination, kinesthetic sense, posture, motor skill, proprioception and motor activation to allow for proper function of core, proximal hip and LE with self care and ADLs  [] (99119) Gait Re-education- Provided training and instruction to the patient for proper LE, core and proximal hip recruitment and positioning and eccentric body weight control with ambulation re-education including up and down stairs     Home Exercise Program:    [x] (89363) Reviewed/Progressed HEP activities related to strengthening, flexibility, endurance, ROM of core, proximal hip and LE for functional self-care, mobility, lifting and ambulation/stair navigation   [] (82329)Reviewed/Progressed HEP activities related to improving balance, coordination, kinesthetic sense, posture, motor skill, proprioception of core, proximal hip and LE for self care, mobility, lifting, and ambulation/stair navigation      Manual Treatments:  PROM / STM / Oscillations-Mobs:  G-I, II, III, IV (PA's, Inf., Post.)  [] (85882) Provided manual therapy to mobilize LE, proximal hip and/or LS spine soft tissue/joints for the purpose of modulating pain, promoting relaxation,  increasing ROM, reducing/eliminating soft tissue swelling/inflammation/restriction, improving soft tissue extensibility and allowing for proper ROM for normal function with self care, mobility, lifting and ambulation. Modalities:  ice to go    Charges:  Timed Code Treatment Minutes: 64'   Total Treatment Minutes: 4:00-5:15  76'       [] EVAL (LOW) 54232 (typically 20 minutes face-to-face)  [] EVAL (MOD) 91654 (typically 30 minutes face-to-face)  [] EVAL (HIGH) 92129 (typically 45 minutes face-to-face)  [] RE-EVAL   [x] IX(74218) x 2    [] IONTO  [x] NMR (04681) x  1   [] VASO  [] Manual (97589) x      [] Other:  [x] TA x  1    [] Mech Traction (64468)  [] ES(attended) (45574)      [] ES (un) (27679):     GOALS:   Patient stated goal: return to light light sports    [] Progressing: [] Met: [] Not Met: [] Adjusted     Therapist goals for Patient:   Short Term Goals: To be achieved in: 2 -4weeks  1. Independent in HEP and progression per patient tolerance, in order to prevent re-injury. [] Progressing: [] Met: [] Not Met: [] Adjusted   2. Patient will have a decrease in pain to facilitate improvement in movement, function, and ADLs as indicated by Functional Deficits. [] Progressing: [] Met: [] Not Met: [] Adjusted     Long Term Goals: To be achieved in: 12 weeks  1. Foto 62/100 to assist with reaching prior level of function. [] Progressing: [] Met: [] Not Met: [] Adjusted  2. Patient will demonstrate increased AROM to = R to allow for proper joint functioning as indicated by patients Functional Deficits. [] Progressing: [] Met: [] Not Met: [] Adjusted  3.  Patient will demonstrate an increase in Strength to good proximal hip strength and control  in LE to allow for proper functional mobility as indicated by patients Functional Deficits. [] Progressing: [] Met: [] Not Met: [] Adjusted  4. Patient will return to  functional activities normal heel to toe gait in community, ascend and descend 12 steps alternating without increased symptoms or restriction. [] Progressing: [] Met: [] Not Met: [] Adjusted      Overall Progression Towards Functional goals/ Treatment Progress Update:  [x] Patient is progressing as expected towards functional goals listed. [] Progression is slowed due to complexities/Impairments listed. [] Progression has been slowed due to co-morbidities. [] Plan just implemented, too soon to assess goals progression <30days   [] Goals require adjustment due to lack of progress  [] Patient is not progressing as expected and requires additional follow up with physician  [] Other    Prognosis for POC: [x] Good [] Fair  [] Poor      Patient requires continued skilled intervention: [x] Yes  [] No    Treatment/Activity Tolerance:  [x] Patient able to complete treatment  [] Patient limited by fatigue  [] Patient limited by pain     [] Patient limited by other medical complications  [x] Other: No complaints with today's program.  Pt noted anterior knee discomfort with wall sits. We had him increase his trunk flexion with the exercise and he was able to increase his squat depth without any pain. Pt was challenged by eccentric control with step up exercise. Quad atrophy is still present. ROM is nearly normal.  Requires continued focus on quad strength. Patient Education:  Reviewed diagnosis, POC, HEP and its importance. Access Code: [email or web address]  URL: [email or web address]  Date: 09/09/2022  Prepared by: Urban Mena     Exercises  Long Sitting Calf Stretch with Strap - 2-3 x daily - 7 x weekly - 5 reps - 30 hold  Seated Table Hamstring Stretch - 2-3 x daily - 7 x weekly - 5 reps - 30 hold  Long Sitting Quad Set - 2-3 x daily - 7 x weekly - 10 reps - 10 hold  Supine Straight Leg Hip Adduction and Quad Set with Ball - 2-3 x daily - 7 x weekly - 10 reps - 10 hold  Sidelying Hip Abduction - 2-3 x daily - 7 x weekly - 3 sets - 10 reps  Prone Hip Extension on Table - 2-3 x daily - 7 x weekly - 3 sets - 10 reps - 1 hold  Supine Active Straight Leg Raise - 2-3 x daily - 7 x weekly - 3 sets - 10 reps - 1 hold  Standing Hip Flexion AROM - 2-3 x daily - 7 x weekly - 3 sets - 10 reps  Sitting Heel Slide with Towel - 2-3 x daily - 7 x weekly - 10 reps - 10 hold      PLAN:   [x] Continue per plan of care [] Alter current plan (see comments above)  [] Plan of care initiated [] Hold pending MD visit [] Discharge    Progress per surgical restrictions      Electronically signed by:  Carlos Samaniego PT    Note: If patient does not return for scheduled/ recommended follow up visits, this note will serve as a discharge from care along with most recent update on progress.

## 2022-11-28 ENCOUNTER — HOSPITAL ENCOUNTER (OUTPATIENT)
Dept: PHYSICAL THERAPY | Age: 37
Setting detail: THERAPIES SERIES
Discharge: HOME | End: 2022-11-28
Payer: COMMERCIAL

## 2022-11-28 PROCEDURE — 97530 THERAPEUTIC ACTIVITIES: CPT | Performed by: PHYSICAL THERAPIST

## 2022-11-28 PROCEDURE — 97110 THERAPEUTIC EXERCISES: CPT | Performed by: PHYSICAL THERAPIST

## 2022-11-28 PROCEDURE — 97112 NEUROMUSCULAR REEDUCATION: CPT | Performed by: PHYSICAL THERAPIST

## 2022-11-28 RX ORDER — SULFAMETHOXAZOLE AND TRIMETHOPRIM 800; 160 MG/1; MG/1
1 TABLET ORAL 2 TIMES DAILY
Qty: 10 TABLET | Refills: 0 | Status: SHIPPED | OUTPATIENT
Start: 2022-11-28 | End: 2022-12-03

## 2022-11-28 NOTE — FLOWSHEET NOTE
The [address] [place] [address],[address], [address]  Phone: [phone]   Fax:     [phone]    Physical Therapy Daily Treatment Note  Date:  2022    Patient Name:  Selma Parham    :  1985  MRN: [id number]  Restrictions/Precautions:    Medical/Treatment Diagnosis Information:  Diagnosis: 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 SURGEON:  Jose Roberto Delcid MD     ASSISTANT:  Tarun Arthur MD     ANESTHESIA:  General with adductor canal block. PREOPERATIVE DIAGNOSIS:  Focal cartilage defect, medial facet patella. POSTOPERATIVE DIAGNOSES:  Focal cartilage defect, medial facet patella  with synovitis. Insurance/Certification information:  PT Insurance Information: [employer] auth needed  Physician Information:   Jose Roberto Delcid  Has the plan of care been signed (Y/N):        []  Yes  [x]  No     Date of Patient follow up with Physician: per md      Is this a Progress Report:     []  Yes  [x]  No        If Yes:  Date Range for reporting period:  Beginning  Ending    Progress report will be due (10 Rx or 30 days whichever is less): 45/0      Recertification will be due (POC Duration  / 90 days whichever is less):          Visit # Insurance Allowable Auth Required   15/16 approved 60 per yr   [x]  Yes COHERE []  No        Functional Scale: Foto /    Date assessed:         Latex Allergy:  [x]NO      []YES  Preferred Language for Healthcare:   [x]English       []other:    Pain level:  3/10      SUBJECTIVE:  3 months post op. Reports that going up stairs is getting easier. He still has discomfort and is challenged by going down stairs.   Reports that his leg continues to feel a little weak.      OBJECTIVE:     Flexibility L R Comment   Hamstring + mild      Gastroc  + mild       ITB         Quad                                  ROM PROM AROM Overpressure Comment     L R L R L R     Flexion 129 (10/14)  Sheet pulls               Extension 0                                                         Strength L R Comment   Quad fair       Hamstring         Gastroc         Hip  flexion         Hip abd                                   Special Test Results/Comment   Meniscal Click     Crepitus     Flexion Test     Valgus Laxity     Varus Laxity     Lachmans     Drop Back     Homans neg                 Reflexes/Sensation:               [x]Dermatomes/Myotomes intact               []Reflexes equal and normal bilaterally              []Other:     Joint mobility: not assessed               []Normal                       []Hypo              []Hyper     Palpation: general TTP due to recent surgery      Functional Mobility/Transfers: modified I     Posture: WFL for a 30 yr old male      Bandages/Dressings/Incisions: mild raised rash anterior knee     Gait: WBAT, TROM unlocked 0-open° wears when outside of house     Orthopedic Special Tests:     RESTRICTIONS/PRECAUTIONS: PF cartilage transplant, progress to full WB with TROM locked as quad recruitment improves. TROM locked for gait 0-4 weeks. ROM 0-90  0-4 weeks then gradually increase to full by 8 weeks.       Exercises/Interventions:   Exercise/Equipment Resistance/Repetitions Other comments   Stretching     Hamstring [id number]       Inclined Calf 5x30\"    Hip Flexion     ITB     Groin     Quad          Bike upright 7'         SLR           Adduction           SLR+ 5x30\"         Isometrics           Patellar Glides     Medial     Superior     Inferior          ROM     Sheet Pulls 10x10\"    Hang Weights     Passive     Active     Weight Shift     Ankle Pumps                    CKC     Calf raises  SL  3x10 standing   Wall sits w/ trunk flexion 5x45\"    Step ups 4\" 3x10    1 leg stand     Squatting BOSU 3x10    CC TKE 80lbs 3x10    Sidesteps/monster walks purple loop 4 laps each    Balance Biodex maze lvl 5, 3'    bridges Grey/blue loop 3x10    Damon maría 2x10    PRE     Extension LAQ 5lbs 3x10 RANGE: 90-40   Flexion  RANGE:        Quantum machines     Leg press ecc 105# 3x10 60-10    Leg extension     Leg curl ecc 0-90 100# 3x10         Manual interventions                Therapeutic Exercise and NMR EXR  [x] (68057) Provided verbal/tactile cueing for activities related to strengthening, flexibility, endurance, ROM for improvements in LE, proximal hip, and core control with self care, mobility, lifting, ambulation. [x] (08714) Provided verbal/tactile cueing for activities related to improving balance, coordination, kinesthetic sense, posture, motor skill, proprioception  to assist with LE, proximal hip, and core control in self care, mobility, lifting, ambulation and eccentric single leg control.      NMR and Therapeutic Activities:    [] (59543 or 23322) Provided verbal/tactile cueing for activities related to improving balance, coordination, kinesthetic sense, posture, motor skill, proprioception and motor activation to allow for proper function of core, proximal hip and LE with self care and ADLs  [] (80905) Gait Re-education- Provided training and instruction to the patient for proper LE, core and proximal hip recruitment and positioning and eccentric body weight control with ambulation re-education including up and down stairs     Home Exercise Program:    [x] (42031) Reviewed/Progressed HEP activities related to strengthening, flexibility, endurance, ROM of core, proximal hip and LE for functional self-care, mobility, lifting and ambulation/stair navigation   [] (08590)Reviewed/Progressed HEP activities related to improving balance, coordination, kinesthetic sense, posture, motor skill, proprioception of core, proximal hip and LE for self care, mobility, lifting, and ambulation/stair navigation      Manual Treatments:  PROM / STM / Oscillations-Mobs:  G-I, II, III, IV (PA's, Inf., Post.)  [] (57726) Provided manual therapy to mobilize LE, proximal hip and/or LS spine soft tissue/joints for the purpose of modulating pain, promoting relaxation,  increasing ROM, reducing/eliminating soft tissue swelling/inflammation/restriction, improving soft tissue extensibility and allowing for proper ROM for normal function with self care, mobility, lifting and ambulation. Modalities:  ice 15'    Charges:  Timed Code Treatment Minutes: 54'   Total Treatment Minutes: 8:28-9:40  67'       [] EVAL (LOW) 455 1011 (typically 20 minutes face-to-face)  [] EVAL (MOD) 71745 (typically 30 minutes face-to-face)  [] EVAL (HIGH) 19676 (typically 45 minutes face-to-face)  [] RE-EVAL   [x] RD(14755) x 2    [] IONTO  [x] NMR (39018) x  1   [] VASO  [] Manual (29698) x      [] Other:  [x] TA x  1    [] Mech Traction (20197)  [] ES(attended) (20582)      [] ES (un) (14284):     GOALS:   Patient stated goal: return to light light sports    [] Progressing: [] Met: [] Not Met: [] Adjusted     Therapist goals for Patient:   Short Term Goals: To be achieved in: 2 -4weeks  1. Independent in HEP and progression per patient tolerance, in order to prevent re-injury. [] Progressing: [] Met: [] Not Met: [] Adjusted   2. Patient will have a decrease in pain to facilitate improvement in movement, function, and ADLs as indicated by Functional Deficits. [] Progressing: [] Met: [] Not Met: [] Adjusted     Long Term Goals: To be achieved in: 12 weeks  1. Foto 62/100 to assist with reaching prior level of function. [] Progressing: [] Met: [] Not Met: [] Adjusted  2. Patient will demonstrate increased AROM to = R to allow for proper joint functioning as indicated by patients Functional Deficits. [] Progressing: [] Met: [] Not Met: [] Adjusted  3.  Patient will demonstrate an increase in Strength to good proximal hip strength and control  in LE to allow for proper functional mobility as indicated by patients Functional Deficits. [] Progressing: [] Met: [] Not Met: [] Adjusted  4. Patient will return to  functional activities normal heel to toe gait in community, ascend and descend 12 steps alternating without increased symptoms or restriction. [] Progressing: [] Met: [] Not Met: [] Adjusted      Overall Progression Towards Functional goals/ Treatment Progress Update:  [x] Patient is progressing as expected towards functional goals listed. [] Progression is slowed due to complexities/Impairments listed. [] Progression has been slowed due to co-morbidities. [] Plan just implemented, too soon to assess goals progression <30days   [] Goals require adjustment due to lack of progress  [] Patient is not progressing as expected and requires additional follow up with physician  [] Other    Prognosis for POC: [x] Good [] Fair  [] Poor      Patient requires continued skilled intervention: [x] Yes  [] No    Treatment/Activity Tolerance:  [x] Patient able to complete treatment  [] Patient limited by fatigue  [] Patient limited by pain     [] Patient limited by other medical complications  [x] Other: No complaints with today's program.  Brittney Betters sits continue to be modified secondary to anterior knee discomfort. Pt does respond better with increased trunk flexion and squat depth continues to be limited. Pt did note some discomfort with kyle maría. We decreased squat depth and pt responded well. ROM is WNL. Patient Education:  Reviewed diagnosis, POC, HEP and its importance. Access Code: [email or web address]  URL: [email or web address]  Date: 09/09/2022  Prepared by: Abhijit Elizabeth     Exercises  Long Sitting Calf Stretch with Strap - 2-3 x daily - 7 x weekly - 5 reps - 30 hold  Seated Table Hamstring Stretch - 2-3 x daily - 7 x weekly - 5 reps - 30 hold  Long Sitting Quad Set - 2-3 x daily - 7 x weekly - 10 reps - 10 hold  Supine Straight Leg Hip Adduction and Quad Set with Ball - 2-3 x daily - 7 x weekly - 10 reps - 10 hold  Sidelying Hip Abduction - 2-3 x daily - 7 x weekly - 3 sets - 10 reps  Prone Hip Extension on Table - 2-3 x daily - 7 x weekly - 3 sets - 10 reps - 1 hold  Supine Active Straight Leg Raise - 2-3 x daily - 7 x weekly - 3 sets - 10 reps - 1 hold  Standing Hip Flexion AROM - 2-3 x daily - 7 x weekly - 3 sets - 10 reps  Sitting Heel Slide with Towel - 2-3 x daily - 7 x weekly - 10 reps - 10 hold      PLAN:   [x] Continue per plan of care [] Alter current plan (see comments above)  [] Plan of care initiated [] Hold pending MD visit [] Discharge    Progress per surgical restrictions      Electronically signed by:  Paco Rivera PT    Note: If patient does not return for scheduled/ recommended follow up visits, this note will serve as a discharge from care along with most recent update on progress.

## 2022-12-06 ENCOUNTER — HOSPITAL ENCOUNTER (OUTPATIENT)
Dept: PHYSICAL THERAPY | Age: 37
Setting detail: THERAPIES SERIES
Discharge: HOME | End: 2022-12-06
Payer: COMMERCIAL

## 2022-12-06 PROCEDURE — 97112 NEUROMUSCULAR REEDUCATION: CPT | Performed by: PHYSICAL THERAPIST

## 2022-12-06 PROCEDURE — 97110 THERAPEUTIC EXERCISES: CPT | Performed by: PHYSICAL THERAPIST

## 2022-12-06 PROCEDURE — 97530 THERAPEUTIC ACTIVITIES: CPT | Performed by: PHYSICAL THERAPIST

## 2022-12-06 NOTE — FLOWSHEET NOTE
The [facility], [address] [place] [address], [address] [place] [address]  Phone: [phone]   Fax:     [phone]    Physical Therapy Daily Treatment Note  Date:  2022    Patient Name:  Grace Gonzales    :  1985  MRN: [id number]  Restrictions/Precautions:    Medical/Treatment Diagnosis Information:  Diagnosis: 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 SURGEON:  Emily Garcia MD     ASSISTANT:  Diane Hatfield MD     ANESTHESIA:  General with adductor canal block. PREOPERATIVE DIAGNOSIS:  Focal cartilage defect, medial facet patella. POSTOPERATIVE DIAGNOSES:  Focal cartilage defect, medial facet patella  with synovitis. Insurance/Certification information:  PT Insurance Information: [employer] auth needed  Physician Information:   Emily Garcia  Has the plan of care been signed (Y/N):        []  Yes  [x]  No     Date of Patient follow up with Physician: per md      Is this a Progress Report:     []  Yes  [x]  No        If Yes:  Date Range for reporting period:  Beginning  Ending    Progress report will be due (10 Rx or 30 days whichever is less):       Recertification will be due (POC Duration  / 90 days whichever is less):          Visit # Insurance Allowable Auth Required    approved 60 per yr   [x]  Yes COHERE []  No        Functional Scale: Foto 21/    Date assessed:         Latex Allergy:  [x]NO      []YES  Preferred Language for Healthcare:   [x]English       []other:    Pain level:  3/10      SUBJECTIVE:  Reports that he is knee has been doing well overall. He notes that he did tweak his knee getting out of the car this AM.  He feels like this should resolve after riding the bike today.   Reports that Shift     Ankle Pumps                    CKC     Calf raises  SL  3x10 standing   Wall sits 60 degrees 5x45\"    Step ups 4\" 3x10    1 leg stand     Squatting BOSU 3x10    CC TKE 80lbs 3x10    Sidesteps/monster walks purple loop 4 laps each    Balance Biodex maze lvl 5, 3'    bridges Grey/blue loop 3x10    Damon maría 2x10    PRE     Extension LAQ 5lbs 3x10 RANGE: 90-40   Flexion  RANGE:        Quantum machines     Leg press ecc 110# 3x10 60-10    Leg extension     Leg curl ecc 0-90 100# 3x10         Manual interventions                Therapeutic Exercise and NMR EXR  [x] (03435) Provided verbal/tactile cueing for activities related to strengthening, flexibility, endurance, ROM for improvements in LE, proximal hip, and core control with self care, mobility, lifting, ambulation. [x] (81880) Provided verbal/tactile cueing for activities related to improving balance, coordination, kinesthetic sense, posture, motor skill, proprioception  to assist with LE, proximal hip, and core control in self care, mobility, lifting, ambulation and eccentric single leg control.      NMR and Therapeutic Activities:    [] (03488 or 10398) Provided verbal/tactile cueing for activities related to improving balance, coordination, kinesthetic sense, posture, motor skill, proprioception and motor activation to allow for proper function of core, proximal hip and LE with self care and ADLs  [] (97695) Gait Re-education- Provided training and instruction to the patient for proper LE, core and proximal hip recruitment and positioning and eccentric body weight control with ambulation re-education including up and down stairs     Home Exercise Program:    [x] (08505) Reviewed/Progressed HEP activities related to strengthening, flexibility, endurance, ROM of core, proximal hip and LE for functional self-care, mobility, lifting and ambulation/stair navigation   [] (00265)Reviewed/Progressed HEP activities related to improving balance, coordination, kinesthetic sense, posture, motor skill, proprioception of core, proximal hip and LE for self care, mobility, lifting, and ambulation/stair navigation      Manual Treatments:  PROM / STM / Oscillations-Mobs:  G-I, II, III, IV (PA's, Inf., Post.)  [] (24935) Provided manual therapy to mobilize LE, proximal hip and/or LS spine soft tissue/joints for the purpose of modulating pain, promoting relaxation,  increasing ROM, reducing/eliminating soft tissue swelling/inflammation/restriction, improving soft tissue extensibility and allowing for proper ROM for normal function with self care, mobility, lifting and ambulation. Modalities:  ice 15'    Charges:  Timed Code Treatment Minutes: 54'   Total Treatment Minutes: 8:28-9:30  58'       [] EVAL (LOW) 455 1011 (typically 20 minutes face-to-face)  [] EVAL (MOD) 36646 (typically 30 minutes face-to-face)  [] EVAL (HIGH) 35119 (typically 45 minutes face-to-face)  [] RE-EVAL   [x] FD(14328) x 2    [] IONTO  [x] NMR (58684) x  1   [] VASO  [] Manual (43195) x      [] Other:  [x] TA x  1    [] Mech Traction (13491)  [] ES(attended) (54210)      [] ES (un) (31701):     GOALS:   Patient stated goal: return to light light sports    [] Progressing: [] Met: [] Not Met: [] Adjusted     Therapist goals for Patient:   Short Term Goals: To be achieved in: 2 -4weeks  1. Independent in HEP and progression per patient tolerance, in order to prevent re-injury. [] Progressing: [] Met: [] Not Met: [] Adjusted   2. Patient will have a decrease in pain to facilitate improvement in movement, function, and ADLs as indicated by Functional Deficits. [] Progressing: [] Met: [] Not Met: [] Adjusted     Long Term Goals: To be achieved in: 12 weeks  1. Foto 62/100 to assist with reaching prior level of function. [] Progressing: [] Met: [] Not Met: [] Adjusted  2. Patient will demonstrate increased AROM to = R to allow for proper joint functioning as indicated by patients Functional Deficits. [] Progressing: [] Met: [] Not Met: [] Adjusted  3. Patient will demonstrate an increase in Strength to good proximal hip strength and control  in LE to allow for proper functional mobility as indicated by patients Functional Deficits. [] Progressing: [] Met: [] Not Met: [] Adjusted  4. Patient will return to  functional activities normal heel to toe gait in community, ascend and descend 12 steps alternating without increased symptoms or restriction. [] Progressing: [] Met: [] Not Met: [] Adjusted      Overall Progression Towards Functional goals/ Treatment Progress Update:  [x] Patient is progressing as expected towards functional goals listed. [] Progression is slowed due to complexities/Impairments listed. [] Progression has been slowed due to co-morbidities. [] Plan just implemented, too soon to assess goals progression <30days   [] Goals require adjustment due to lack of progress  [] Patient is not progressing as expected and requires additional follow up with physician  [] Other    Prognosis for POC: [x] Good [] Fair  [] Poor      Patient requires continued skilled intervention: [x] Yes  [] No    Treatment/Activity Tolerance:  [x] Patient able to complete treatment  [] Patient limited by fatigue  [] Patient limited by pain     [] Patient limited by other medical complications  [x] Other: No complaints with today's program.  Pt was able to increase squat depth with wall sits without trunk flexion today without pain. Progressed resistance with several exercises without problem. Strength is improving. Significant quad atrophy is still present. Requires continued focus on strengthening. Patient Education:  Reviewed diagnosis, POC, HEP and its importance. Access Code: [email or web address]  URL: [email or web address]  Date: 09/09/2022  Prepared by: Joe Apple     Exercises  Long Sitting Calf Stretch with Strap - 2-3 x daily - 7 x weekly - 5 reps - 30 hold  Seated Table Hamstring Stretch - 2-3 x daily - 7 x weekly - 5 reps - 30 hold  Long Sitting Quad Set - 2-3 x daily - 7 x weekly - 10 reps - 10 hold  Supine Straight Leg Hip Adduction and Quad Set with Ball - 2-3 x daily - 7 x weekly - 10 reps - 10 hold  Sidelying Hip Abduction - 2-3 x daily - 7 x weekly - 3 sets - 10 reps  Prone Hip Extension on Table - 2-3 x daily - 7 x weekly - 3 sets - 10 reps - 1 hold  Supine Active Straight Leg Raise - 2-3 x daily - 7 x weekly - 3 sets - 10 reps - 1 hold  Standing Hip Flexion AROM - 2-3 x daily - 7 x weekly - 3 sets - 10 reps  Sitting Heel Slide with Towel - 2-3 x daily - 7 x weekly - 10 reps - 10 hold      PLAN:   [x] Continue per plan of care [] Alter current plan (see comments above)  [] Plan of care initiated [] Hold pending MD visit [] Discharge    Progress per surgical restrictions      Electronically signed by:  Celeste Gallardo PT    Note: If patient does not return for scheduled/ recommended follow up visits, this note will serve as a discharge from care along with most recent update on progress.

## 2022-12-08 ENCOUNTER — HOSPITAL ENCOUNTER (OUTPATIENT)
Dept: PHYSICAL THERAPY | Age: 37
Setting detail: THERAPIES SERIES
Discharge: HOME | End: 2022-12-08
Payer: COMMERCIAL

## 2022-12-08 PROCEDURE — 97110 THERAPEUTIC EXERCISES: CPT | Performed by: PHYSICAL THERAPIST

## 2022-12-08 PROCEDURE — 97112 NEUROMUSCULAR REEDUCATION: CPT | Performed by: PHYSICAL THERAPIST

## 2022-12-08 PROCEDURE — 97530 THERAPEUTIC ACTIVITIES: CPT | Performed by: PHYSICAL THERAPIST

## 2022-12-08 NOTE — FLOWSHEET NOTE
[facility], [address] [place] [address]  Phone: [phone]   Fax:     [phone]    Physical Therapy Daily Treatment Note  Date:  2022    Patient Name:  Yobani Maloney    :  1985  MRN: [id number]  Restrictions/Precautions:    Medical/Treatment Diagnosis Information:  Diagnosis: 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 SURGEON:  Taylor Rivera MD     ASSISTANT:  Ethan Garcia MD     ANESTHESIA:  General with adductor canal block. PREOPERATIVE DIAGNOSIS:  Focal cartilage defect, medial facet patella. POSTOPERATIVE DIAGNOSES:  Focal cartilage defect, medial facet patella  with synovitis. Insurance/Certification information:  PT Insurance Information: [employer] auth needed  Physician Information:   Taylor Rivera  Has the plan of care been signed (Y/N):        []  Yes  [x]  No     Date of Patient follow up with Physician: per md      Is this a Progress Report:     []  Yes  [x]  No        If Yes:  Date Range for reporting period:  Beginning  Ending    Progress report will be due (10 Rx or 30 days whichever is less):       Recertification will be due (POC Duration  / 90 days whichever is less):          Visit # Insurance Allowable Auth Required    approved 60 per yr   [x]  Yes COHERE []  No        Functional Scale: Foto 21/    Date assessed:         Latex Allergy:  [x]NO      []YES  Preferred Language for Healthcare:   [x]English       []other:    Pain level:  3/10      SUBJECTIVE:  Reports that he is knee has been doing well. Reports that the symptoms of which he reported last visit have improved. Stairs and squatting are getting easier.       OBJECTIVE:     Flexibility L R Comment   Hamstring + mild      Gastroc  + mild       ITB         Quad                                  ROM PROM AROM Overpressure Comment     L R L R L R     Flexion 129 (10/14)  Sheet pulls               Extension 0                                                         Strength L R Comment   Quad fair       Hamstring         Gastroc         Hip  flexion         Hip abd                                   Special Test Results/Comment   Meniscal Click     Crepitus     Flexion Test     Valgus Laxity     Varus Laxity     Lachmans     Drop Back     Homans neg                 Reflexes/Sensation:               [x]Dermatomes/Myotomes intact               []Reflexes equal and normal bilaterally              []Other:     Joint mobility: not assessed               []Normal                       []Hypo              []Hyper     Palpation: general TTP due to recent surgery      Functional Mobility/Transfers: modified I     Posture: WFL for a 30 yr old male      Bandages/Dressings/Incisions: mild raised rash anterior knee     Gait: WBAT, TROM unlocked 0-open° wears when outside of house     Orthopedic Special Tests:     RESTRICTIONS/PRECAUTIONS: PF cartilage transplant, progress to full WB with TROM locked as quad recruitment improves. TROM locked for gait 0-4 weeks. ROM 0-90  0-4 weeks then gradually increase to full by 8 weeks.       Exercises/Interventions:   Exercise/Equipment Resistance/Repetitions Other comments   Stretching     Hamstring [id number]       Inclined Calf 5x30\"    Hip Flexion     ITB     Groin     Quad          Bike upright 7'         SLR           Adduction           SLR+ 5x30\"         Isometrics           Patellar Glides     Medial     Superior     Inferior          ROM     Sheet Pulls 10x10\"    Hang Weights     Passive     Active     Weight Shift     Ankle Pumps                    CKC     Calf raises  SL  3x10 standing   Wall sits 60 degrees 5x45\"    Step ups 6\" 2x10    1 leg stand     Squatting BOSU 3x10 CC TKE 80lbs 3x10    Sidesteps/monster walks purple loop 4 laps each    Balance Biodex maze lvl 5, 3'       Damon maría 2x10    PRE     Extension LAQ 5lbs 3x10 RANGE: 90-40   Flexion  RANGE:        Quantum machines     Leg press ecc 120# 3x10 60-10    Leg extension     Leg curl ecc 0-90 110# 3x10         Manual interventions                Therapeutic Exercise and NMR EXR  [x] (63539) Provided verbal/tactile cueing for activities related to strengthening, flexibility, endurance, ROM for improvements in LE, proximal hip, and core control with self care, mobility, lifting, ambulation. [x] (40871) Provided verbal/tactile cueing for activities related to improving balance, coordination, kinesthetic sense, posture, motor skill, proprioception  to assist with LE, proximal hip, and core control in self care, mobility, lifting, ambulation and eccentric single leg control.      NMR and Therapeutic Activities:    [] (50613 or 82883) Provided verbal/tactile cueing for activities related to improving balance, coordination, kinesthetic sense, posture, motor skill, proprioception and motor activation to allow for proper function of core, proximal hip and LE with self care and ADLs  [] (70701) Gait Re-education- Provided training and instruction to the patient for proper LE, core and proximal hip recruitment and positioning and eccentric body weight control with ambulation re-education including up and down stairs     Home Exercise Program:    [x] (67403) Reviewed/Progressed HEP activities related to strengthening, flexibility, endurance, ROM of core, proximal hip and LE for functional self-care, mobility, lifting and ambulation/stair navigation   [] (01658)Reviewed/Progressed HEP activities related to improving balance, coordination, kinesthetic sense, posture, motor skill, proprioception of core, proximal hip and LE for self care, mobility, lifting, and ambulation/stair navigation      Manual Treatments:  PROM / STM / Oscillations-Mobs:  G-I, II, III, IV (PA's, Inf., Post.)  [] (77809) Provided manual therapy to mobilize LE, proximal hip and/or LS spine soft tissue/joints for the purpose of modulating pain, promoting relaxation,  increasing ROM, reducing/eliminating soft tissue swelling/inflammation/restriction, improving soft tissue extensibility and allowing for proper ROM for normal function with self care, mobility, lifting and ambulation. Modalities:  ice 15'    Charges:  Timed Code Treatment Minutes: 64'   Total Treatment Minutes: 2:35-4:00 80'       [] EVAL (LOW) [address] (typically 20 minutes face-to-face)  [] EVAL (MOD) 09586 (typically 30 minutes face-to-face)  [] EVAL (HIGH) 30723 (typically 45 minutes face-to-face)  [] RE-EVAL   [x] TK(73783) x 2    [] IONTO  [x] NMR (64282) x  1   [] VASO  [] Manual (63309) x      [] Other:  [x] TA x  1    [] Mech Traction (48965)  [] ES(attended) (24105)      [] ES (un) (80398):     GOALS:   Patient stated goal: return to light light sports    [] Progressing: [] Met: [] Not Met: [] Adjusted     Therapist goals for Patient:   Short Term Goals: To be achieved in: 2 -4weeks  1. Independent in HEP and progression per patient tolerance, in order to prevent re-injury. [] Progressing: [] Met: [] Not Met: [] Adjusted   2. Patient will have a decrease in pain to facilitate improvement in movement, function, and ADLs as indicated by Functional Deficits. [] Progressing: [] Met: [] Not Met: [] Adjusted     Long Term Goals: To be achieved in: 12 weeks  1. Foto 62/100 to assist with reaching prior level of function. [] Progressing: [] Met: [] Not Met: [] Adjusted  2. Patient will demonstrate increased AROM to = R to allow for proper joint functioning as indicated by patients Functional Deficits. [] Progressing: [] Met: [] Not Met: [] Adjusted  3.  Patient will demonstrate an increase in Strength to good proximal hip strength and control  in LE to allow for proper functional mobility as indicated by patients Functional Deficits. [] Progressing: [] Met: [] Not Met: [] Adjusted  4. Patient will return to  functional activities normal heel to toe gait in community, ascend and descend 12 steps alternating without increased symptoms or restriction. [] Progressing: [] Met: [] Not Met: [] Adjusted      Overall Progression Towards Functional goals/ Treatment Progress Update:  [x] Patient is progressing as expected towards functional goals listed. [] Progression is slowed due to complexities/Impairments listed. [] Progression has been slowed due to co-morbidities. [] Plan just implemented, too soon to assess goals progression <30days   [] Goals require adjustment due to lack of progress  [] Patient is not progressing as expected and requires additional follow up with physician  [] Other    Prognosis for POC: [x] Good [] Fair  [] Poor      Patient requires continued skilled intervention: [x] Yes  [] No    Treatment/Activity Tolerance:  [x] Patient able to complete treatment  [] Patient limited by fatigue  [] Patient limited by pain     [] Patient limited by other medical complications  [x] Other: No complaints with today's program.  Able to progress resistance program today without problem. Significant quad fatigue was noted upon completion. Strength is slowly progressing. Requires continued focus on quad strength. Patient Education:  Reviewed diagnosis, POC, HEP and its importance. Access Code: [email or web address]  URL: [email or web address]  Date: 09/09/2022  Prepared by: Joe Apple     Exercises  Long Sitting Calf Stretch with Strap - 2-3 x daily - 7 x weekly - 5 reps - 30 hold  Seated Table Hamstring Stretch - 2-3 x daily - 7 x weekly - 5 reps - 30 hold  Long Sitting Quad Set - 2-3 x daily - 7 x weekly - 10 reps - 10 hold  Supine Straight Leg Hip Adduction and Quad Set with Ball - 2-3 x daily - 7 x weekly - 10 reps - 10 hold  Sidelying Hip Abduction - 2-3 x daily - 7 x weekly - 3 sets - 10 reps  Prone Hip Extension on Table - 2-3 x daily - 7 x weekly - 3 sets - 10 reps - 1 hold  Supine Active Straight Leg Raise - 2-3 x daily - 7 x weekly - 3 sets - 10 reps - 1 hold  Standing Hip Flexion AROM - 2-3 x daily - 7 x weekly - 3 sets - 10 reps  Sitting Heel Slide with Towel - 2-3 x daily - 7 x weekly - 10 reps - 10 hold      PLAN:   [x] Continue per plan of care [] Alter current plan (see comments above)  [] Plan of care initiated [] Hold pending MD visit [] Discharge    Progress per surgical restrictions  Re-assess NPV      Electronically signed by:  Stan Workman PT    Note: If patient does not return for scheduled/ recommended follow up visits, this note will serve as a discharge from care along with most recent update on progress.

## 2022-12-12 ENCOUNTER — HOSPITAL ENCOUNTER (OUTPATIENT)
Dept: PHYSICAL THERAPY | Age: 37
Setting detail: THERAPIES SERIES
Discharge: HOME | End: 2022-12-12
Payer: COMMERCIAL

## 2022-12-12 NOTE — FLOWSHEET NOTE
The [facility] and [address],  [facility], [facility] [address]  [address] [place] [address],[address]   Odilon Mejia  Phone: [phone]  Fax: [phone]      Physical Therapy  Cancellation/No-show Note  Patient Name:  He Levin  :  1985   Date:  2022  Cancelled visits to date: 2  No-shows to date: 0    For today's appointment patient:  [x]  Cancelled  []  Rescheduled appointment  []  No-show     Reason given by patient:  []  Patient ill  []  Conflicting appointment   []  No transportation    [x]  Conflict with work  []  No reason given  []  Other:     Comments:      Electronically signed by:  Jaron Mcallister PTA

## 2022-12-16 ENCOUNTER — APPOINTMENT (OUTPATIENT)
Dept: PHYSICAL THERAPY | Age: 37
End: 2022-12-16
Payer: COMMERCIAL

## 2022-12-19 ENCOUNTER — OFFICE VISIT (OUTPATIENT)
Dept: ORTHOPEDIC SURGERY | Age: 37
End: 2022-12-19

## 2022-12-19 ENCOUNTER — HOSPITAL ENCOUNTER (OUTPATIENT)
Dept: PHYSICAL THERAPY | Age: 37
Setting detail: THERAPIES SERIES
Discharge: HOME | End: 2022-12-19
Payer: COMMERCIAL

## 2022-12-19 VITALS — WEIGHT: 200 LBS | HEIGHT: 74 IN | BODY MASS INDEX: 25.67 KG/M2

## 2022-12-19 DIAGNOSIS — Z98.890 S/P RIGHT KNEE ARTHROSCOPY: Primary | ICD-10-CM

## 2022-12-19 PROCEDURE — 97110 THERAPEUTIC EXERCISES: CPT | Performed by: PHYSICAL THERAPIST

## 2022-12-19 PROCEDURE — 97112 NEUROMUSCULAR REEDUCATION: CPT | Performed by: PHYSICAL THERAPIST

## 2022-12-19 PROCEDURE — 97530 THERAPEUTIC ACTIVITIES: CPT | Performed by: PHYSICAL THERAPIST

## 2022-12-19 NOTE — FLOWSHEET NOTE
The [address] [place] [address],[address], [address] [address] [place] , [address] [place] [address]  Phone: [phone]   Fax:     [phone]    Physical Therapy Daily Treatment Note  Date:  2022    Patient Name:  Patricia Campos    :  1985  MRN: [id number]  Restrictions/Precautions:    Medical/Treatment Diagnosis Information:  Diagnosis: 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 SURGEON:  Vincenzo Lewis MD     ASSISTANT:  Lazaro Bruno MD     ANESTHESIA:  General with adductor canal block. PREOPERATIVE DIAGNOSIS:  Focal cartilage defect, medial facet patella. POSTOPERATIVE DIAGNOSES:  Focal cartilage defect, medial facet patella  with synovitis. Insurance/Certification information:  PT Insurance Information: [employer] auth needed  Physician Information:   Vincenzo Lewis  Has the plan of care been signed (Y/N):        []  Yes  [x]  No     Date of Patient follow up with Physician: per md      Is this a Progress Report:     []  Yes  [x]  No        If Yes:  Date Range for reporting period:  Beginning  Ending    Progress report will be due (10 Rx or 30 days whichever is less): 54/0      Recertification will be due (POC Duration  / 90 days whichever is less):          Visit # Insurance Allowable Auth Required   / approved 60 per yr   [x]  Yes COHERE []  No        Functional Scale: Foto 21/100    Date assessed:         Latex Allergy:  [x]NO      []YES  Preferred Language for Healthcare:   [x]English       []other:    Pain level:  3/10      SUBJECTIVE:  Reports that he is knee has been doing well. Still notes some difficulty with managing stairs in the AM, however this does seem to improving as the day goes on.     OBJECTIVE:     Flexibility L R Comment   Hamstring + mild      Gastroc  + mild       ITB         Quad                                  ROM PROM AROM Overpressure Comment     L R L R L R     Flexion 129 (10/14)  Sheet pulls               Extension 0                                                         Strength L R Comment   Quad fair       Hamstring         Gastroc         Hip  flexion         Hip abd                                   Special Test Results/Comment   Meniscal Click     Crepitus     Flexion Test     Valgus Laxity     Varus Laxity     Lachmans     Drop Back     Homans neg                 Reflexes/Sensation:               [x]Dermatomes/Myotomes intact               []Reflexes equal and normal bilaterally              []Other:     Joint mobility: not assessed               []Normal                       []Hypo              []Hyper     Palpation: general TTP due to recent surgery      Functional Mobility/Transfers: modified I     Posture: WFL for a 30 yr old male      Bandages/Dressings/Incisions: mild raised rash anterior knee     Gait: WBAT, TROM unlocked 0-open° wears when outside of house     Orthopedic Special Tests:     RESTRICTIONS/PRECAUTIONS: PF cartilage transplant, progress to full WB with TROM locked as quad recruitment improves. TROM locked for gait 0-4 weeks. ROM 0-90  0-4 weeks then gradually increase to full by 8 weeks.       Exercises/Interventions:   Exercise/Equipment Resistance/Repetitions Other comments   Stretching     Hamstring [id number]       Inclined Calf 5x30\"    Hip Flexion     ITB     Groin     Quad          Bike upright 7'         SLR           Adduction           SLR+ 5x30\"         Isometrics           Patellar Glides     Medial     Superior     Inferior          ROM     Sheet Pulls 10x10\"    Hang Weights     Passive     Active     Weight Shift     Ankle Pumps                    CKC     Calf raises  SL  3x10 standing   Wall sits 60 degrees 5x45\"    Step ups 6\" 3x10    1 leg stand     Squatting BOSU 3x10 CC TKE 80lbs 3x10    Sidesteps/monster walks purple loop 4 laps each    Balance Biodex maze lvl 5, 3'       Damon maría 2x10    PRE     RANGE: 90-40   Flexion  RANGE:        Quantum machines     Leg press ecc 130# 3x10 60-10    Leg extension DL 90-40 25lbs 3x10    Leg curl ecc 0-90 125# 3x10         Manual interventions                Therapeutic Exercise and NMR EXR  [x] (99718) Provided verbal/tactile cueing for activities related to strengthening, flexibility, endurance, ROM for improvements in LE, proximal hip, and core control with self care, mobility, lifting, ambulation. [x] (65478) Provided verbal/tactile cueing for activities related to improving balance, coordination, kinesthetic sense, posture, motor skill, proprioception  to assist with LE, proximal hip, and core control in self care, mobility, lifting, ambulation and eccentric single leg control.      NMR and Therapeutic Activities:    [] (58917 or 58641) Provided verbal/tactile cueing for activities related to improving balance, coordination, kinesthetic sense, posture, motor skill, proprioception and motor activation to allow for proper function of core, proximal hip and LE with self care and ADLs  [] (92434) Gait Re-education- Provided training and instruction to the patient for proper LE, core and proximal hip recruitment and positioning and eccentric body weight control with ambulation re-education including up and down stairs     Home Exercise Program:    [x] (42983) Reviewed/Progressed HEP activities related to strengthening, flexibility, endurance, ROM of core, proximal hip and LE for functional self-care, mobility, lifting and ambulation/stair navigation   [] (55571)Reviewed/Progressed HEP activities related to improving balance, coordination, kinesthetic sense, posture, motor skill, proprioception of core, proximal hip and LE for self care, mobility, lifting, and ambulation/stair navigation      Manual Treatments:  PROM / STM / Oscillations-Mobs:  G-I, II, III, IV (PA's, Inf., Post.)  [] (16934) Provided manual therapy to mobilize LE, proximal hip and/or LS spine soft tissue/joints for the purpose of modulating pain, promoting relaxation,  increasing ROM, reducing/eliminating soft tissue swelling/inflammation/restriction, improving soft tissue extensibility and allowing for proper ROM for normal function with self care, mobility, lifting and ambulation. Modalities:  ice 15'    Charges:  Timed Code Treatment Minutes: 47'   Total Treatment Minutes: 1:45-2:55  70'       [] EVAL (LOW) 25136 (typically 20 minutes face-to-face)  [] EVAL (MOD) 95324 (typically 30 minutes face-to-face)  [] EVAL (HIGH) 97922 (typically 45 minutes face-to-face)  [] RE-EVAL   [x] ET(46077) x 2    [] IONTO  [x] NMR (87248) x  1   [] VASO  [] Manual (91293) x      [] Other:  [x] TA x  1    [] Mech Traction (86558)  [] ES(attended) (86050)      [] ES (un) (66664):     GOALS:   Patient stated goal: return to light light sports    [] Progressing: [] Met: [] Not Met: [] Adjusted     Therapist goals for Patient:   Short Term Goals: To be achieved in: 2 -4weeks  1. Independent in HEP and progression per patient tolerance, in order to prevent re-injury. [] Progressing: [] Met: [] Not Met: [] Adjusted   2. Patient will have a decrease in pain to facilitate improvement in movement, function, and ADLs as indicated by Functional Deficits. [] Progressing: [] Met: [] Not Met: [] Adjusted     Long Term Goals: To be achieved in: 12 weeks  1. Foto 62/100 to assist with reaching prior level of function. [] Progressing: [] Met: [] Not Met: [] Adjusted  2. Patient will demonstrate increased AROM to = R to allow for proper joint functioning as indicated by patients Functional Deficits. [] Progressing: [] Met: [] Not Met: [] Adjusted  3.  Patient will demonstrate an increase in Strength to good proximal hip strength and control  in LE to allow for proper functional mobility as indicated by patients Functional Deficits. [] Progressing: [] Met: [] Not Met: [] Adjusted  4. Patient will return to  functional activities normal heel to toe gait in community, ascend and descend 12 steps alternating without increased symptoms or restriction. [] Progressing: [] Met: [] Not Met: [] Adjusted      Overall Progression Towards Functional goals/ Treatment Progress Update:  [x] Patient is progressing as expected towards functional goals listed. [] Progression is slowed due to complexities/Impairments listed. [] Progression has been slowed due to co-morbidities. [] Plan just implemented, too soon to assess goals progression <30days   [] Goals require adjustment due to lack of progress  [] Patient is not progressing as expected and requires additional follow up with physician  [] Other    Prognosis for POC: [x] Good [] Fair  [] Poor      Patient requires continued skilled intervention: [x] Yes  [] No    Treatment/Activity Tolerance:  [x] Patient able to complete treatment  [] Patient limited by fatigue  [] Patient limited by pain     [] Patient limited by other medical complications  [x] Other: No complaints with today's program.  Able to progress resistance program today without problem. Significant quad fatigue was noted upon completion. Strength is slowly progressing. Requires continued focus on quad strength. Patient Education:  Reviewed diagnosis, POC, HEP and its importance. Access Code: [email or web address]  URL: [email or web address]  Date: 09/09/2022  Prepared by: Jesus Bates     Exercises  Long Sitting Calf Stretch with Strap - 2-3 x daily - 7 x weekly - 5 reps - 30 hold  Seated Table Hamstring Stretch - 2-3 x daily - 7 x weekly - 5 reps - 30 hold  Long Sitting Quad Set - 2-3 x daily - 7 x weekly - 10 reps - 10 hold  Supine Straight Leg Hip Adduction and Quad Set with Ball - 2-3 x daily - 7 x weekly - 10 reps - 10 hold  Sidelying Hip Abduction - 2-3 x daily - 7 x weekly - 3 sets - 10 reps  Prone Hip Extension on Table - 2-3 x daily - 7 x weekly - 3 sets - 10 reps - 1 hold  Supine Active Straight Leg Raise - 2-3 x daily - 7 x weekly - 3 sets - 10 reps - 1 hold  Standing Hip Flexion AROM - 2-3 x daily - 7 x weekly - 3 sets - 10 reps  Sitting Heel Slide with Towel - 2-3 x daily - 7 x weekly - 10 reps - 10 hold      PLAN:   [x] Continue per plan of care [] Alter current plan (see comments above)  [] Plan of care initiated [] Hold pending MD visit [] Discharge    Progress per surgical restrictions  Re-assess NPV      Electronically signed by:  Carl Garcia PT    Note: If patient does not return for scheduled/ recommended follow up visits, this note will serve as a discharge from care along with most recent update on progress.

## 2022-12-19 NOTE — PROGRESS NOTES
Chief Complaint  Follow-up (Left knee. S/p 3 months scope )      History of Present Illness:  Javier Jaime is a pleasant 30 y.o. male here for follow-up regarding his left knee. He has 3 1/2 month status postleft knee arthroscopy with chondroplasty, synovectomy, open patellar cartilage transplant. He is doing well overall, no concerns regarding his joint, is smooth translation of his kneecap, denies any clicking catching or popping. Denies any pain like he did prior to his procedure. He does have a skin complaint. Patient states that at the very distal aspect of his incision he has a tiny spot that continues to open and drain periodically. When it drains he experiences a few spots of clear fluid. Nonbloody, nonpurulent. Medical History:  Patient's medications, allergies, past medical, surgical, social and family histories were reviewed and updated as appropriate. Pain Assessment  Location of Pain: Knee  Location Modifiers: Left  Severity of Pain: 3  Quality of Pain: Dull  Frequency of Pain: Intermittent  Aggravating Factors: Bending (SITTING AND UNEVEN PAVEMENTS)  Limiting Behavior: Some  Relieving Factors: Rest, Ice, Exercise  Result of Injury: No  Work-Related Injury: No  Are there other pain locations you wish to document?: No  ROS: Review of systems reviewed from Patient History Form completed today and available in the patient's chart under the Media tab. Pertinent items are noted in HPI  Review of systems reviewed from Patient History Form completed today and available in the patient's chart under the Media tab. Vital Signs:  Ht 6' 2\" (1.88 m)   Wt 200 lb (90.7 kg)   BMI 25.68 kg/m²       Left knee examination:    Gait: No use of assistive devices. No antalgic gait. Alignment: normal alignment. Inspection/skin: 3 mm erythematous spot at the distal aspect of his incision. Palpation: Smooth translation of the patella    Range of Motion: There is full range of motion. Strength: Normal quadriceps development. Effusion: No effusion or swelling present. Ligamentous stability: No cruciate or collateral ligament instability. Neurologic and vascular: Skin is warm and well-perfused. Sensation is intact to light-touch. Special tests: Negative Eh sign. Radiology:       Pertinent imaging was interpreted and reviewed with the patient today, images only - no report available. No new imaging was obtained during today's visit. Assessment : 72-year-old male 3 1/2 -month status post left knee arthroscopy with chondroplasty synovectomy and open patellar cartilage transplant    Impression:  Encounter Diagnosis   Name Primary? S/P right knee arthroscopy Yes       Office Procedures:  No orders of the defined types were placed in this encounter. Plan: Pertinent imaging was reviewed. The etiology, natural history, and treatment options for the disorder were discussed. The roles of activity medication, antiinflammatories, injections, bracing, physical therapy, and surgical interventions were all described to the patient and questions were answered. From a surgical standpoint, he has smooth translation of the patella, excellent range of motion, good strength. I am happy with his results at 3 months. From an incision standpoint, very distal aspect of his incision there is a small area of redness that continues to periodically open and drain. This is nonseptic appearing. He will explore this to ensure there is no sinus tract for further evaluation. Informed consent was obtained, under sterile conditions and after preparation with ChloraPrep, distal 3 mm of incision was explored. Unable to express any material there is foreign. No sutures were expressed. No sinus tract was appreciated. This was then debrided. Antibiotic ointment was applied and sterile dressing was applied.     Up in 2 months or sooner if worsening symptoms  Stephen Jackson is in agreement with this plan. All questions were answered to patient's satisfaction and was encouraged to call with any further questions. Total time spent for evaluation, education, and development of treatment plan: 35 minutes    All Fatima PA-C  [facility]  12/19/2022    During this exam, I, All Fatima PA-C, functioned as a scribe for Dr. Timmy Lange. The history taking and physical examination were performed by Dr. Timmy Lange. All counseling during the appointment was performed between the patient and Dr. Timmy Lange. 12/19/2022 5:01 PM    This dictation was performed with a verbal recognition program (DRAGON) and it was checked for errors. It is possible that there are still dictated errors within this office note. If so, please bring any areas to my attention for an addendum. All efforts were made to ensure that this office note is accurate. I attest that I met personally with the patient, performed the described exam, reviewed the radiographic studies and medical records associated with this patient and supervised the services that are described above.      Maricarmen Jackson MD

## 2022-12-22 ENCOUNTER — HOSPITAL ENCOUNTER (OUTPATIENT)
Dept: PHYSICAL THERAPY | Age: 37
Setting detail: THERAPIES SERIES
Discharge: HOME | End: 2022-12-22
Payer: COMMERCIAL

## 2022-12-22 PROCEDURE — 97110 THERAPEUTIC EXERCISES: CPT | Performed by: PHYSICAL THERAPIST

## 2022-12-22 PROCEDURE — 97112 NEUROMUSCULAR REEDUCATION: CPT | Performed by: PHYSICAL THERAPIST

## 2022-12-22 PROCEDURE — 97530 THERAPEUTIC ACTIVITIES: CPT | Performed by: PHYSICAL THERAPIST

## 2022-12-22 NOTE — PLAN OF CARE
The [address] [place] [address] [facility], [address] [place] [address], [address] [place] [address]  Phone: [phone]   Fax:     [phone]        Physical Therapy Re-Certification Plan of [facility]       Dear  Dr Jair Sanders,    We had the pleasure of treating the following patient for physical therapy services at [address] [place] [address]. A summary of our findings can be found in the updated assessment below. This includes our plan of care. If you have any questions or concerns regarding these findings, please do not hesitate to contact me at the office phone number checked above. Thank you for the referral.     Physician Signature:________________________________Date:__________________  By signing above (or electronic signature), therapists plan is approved by physician    Date Range Of Visits: 22-22  Total Visits to Date: 23  Overall Response to Treatment:   [x]Patient is responding well to treatment and improvement is noted with regards  to goals   []Patient should continue to improve in reasonable time if they continue HEP   []Patient has plateaued and is no longer responding to skilled PT intervention    []Patient is getting worse and would benefit from return to referring MD   []Patient unable to adhere to initial POC   [x]Other: continue PT 2x/week for 6 weeks          Physical Therapy Daily Treatment Note  Date:  2022    Patient Name:  Katlin Sheppard    :  1985  MRN: [id number]  Restrictions/Precautions:    Medical/Treatment Diagnosis Information:  Diagnosis: 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 SURGEON:  Bogdan Hawthorne MD     ASSISTANT:  Emily Pedersen MD     ANESTHESIA:  General with adductor canal block.      PREOPERATIVE DIAGNOSIS:  Focal cartilage defect, medial facet patella. POSTOPERATIVE DIAGNOSES:  Focal cartilage defect, medial facet patella  with synovitis. Insurance/Certification information:  PT Insurance Information: Nancy Travis auth needed  Physician Information:   Vincenzo Lewis  Has the plan of care been signed (Y/N):        []  Yes  [x]  No     Date of Patient follow up with Physician: per md      Is this a Progress Report:     [x]  Yes  []  No        If Yes:  Date Range for reporting period:  Beginning 9/9/22  Ending 12/22/22    Progress report will be due (10 Rx or 30 days whichever is less): 8/67/79      Recertification will be due (POC Duration  / 90 days whichever is less): 2/2/23         Visit # Insurance Allowable Auth Required   19/28 approved 60 per yr   [x]  Yes COHERE []  No        Functional Scale: Foto 21/100    Date assessed:  9/9       Latex Allergy:  [x]NO      []YES  Preferred Language for Healthcare:   [x]English       []other:    Pain level:  3/10      SUBJECTIVE:  Reports that he is knee has been doing well. He saw the referring physician since last visit and he was pleased with his progress. They did assess the small wound along the incision site. They attempted to remove the suture believed to be the cause of the issue, however were unable to find anything. He notes that he feels like it is healing better since this.       OBJECTIVE: 12/20/22    Flexibility L R Comment   Hamstring + mild      Gastroc  -       ITB         Quad                                  ROM PROM AROM Overpressure Comment     L R L R L R     Flexion     140           Extension     +5                                                     Strength L R Comment   Quad 4-/5       Hamstring  4+/5       Gastroc         Hip  flexion  4+/5       Hip abd  4+/5                  Quad   Fair (+)   [email or web address]          Special Test Results/Comment   Meniscal Click     Crepitus  (-)   Flexion Test     Valgus Laxity     Varus Laxity Lachmans     Drop Back     Homans                  Reflexes/Sensation:               [x]Dermatomes/Myotomes intact               []Reflexes equal and normal bilaterally              []Other:     Joint mobility: PFJ               [x]Normal                       []Hypo              []Hyper     Palpation: (-)     Functional Mobility/Transfers: independent     Posture: WFL for a 30 yr old male      Bandages/Dressings/Incisions: fully healed     Gait: normal without AD     Orthopedic Special Tests:     RESTRICTIONS/PRECAUTIONS: PF cartilage transplant, progress to full WB with TROM locked as quad recruitment improves. TROM locked for gait 0-4 weeks. ROM 0-90  0-4 weeks then gradually increase to full by 8 weeks.       Exercises/Interventions:   Exercise/Equipment Resistance/Repetitions Other comments   Stretching     Hamstring [id number]       Inclined Calf 5x30\"    Hip Flexion     ITB     Groin     Quad          Bike upright 7'         SLR           Adduction           SLR+ 5x30\"         Isometrics           Patellar Glides     Medial     Superior     Inferior          ROM     Sheet Pulls 10x10\"    Hang Weights     Passive     Active     Weight Shift     Ankle Pumps                    CKC     Calf raises  SL  3x10 standing   Wall sits 60 degrees 5x45\"    Step ups 6\" 3x10    1 leg stand     Squatting BOSU 3x10    CC TKE 90lbs 3x10    Sidesteps/monster walks purple loop 4 laps each    Split squat 2x10 each    Balance Biodex maze lvl 5, 3'       Damon maría 2x10    PRE     Extension LAQ 7.5lbs 3x10 RANGE: 90-40   Flexion  RANGE:        Quantum machines     Leg press ecc 135-145# 3x10 60-10    Leg extension DL 90-40 25lbs 3x10    Leg curl ecc 0-90 125# 3x10         Manual interventions                Therapeutic Exercise and NMR EXR  [x] (45578) Provided verbal/tactile cueing for activities related to strengthening, flexibility, endurance, ROM for improvements in LE, proximal hip, and core control with self care, mobility, (typically 20 minutes face-to-face)  [] EVAL (MOD) 59290 (typically 30 minutes face-to-face)  [] EVAL (HIGH) 39493 (typically 45 minutes face-to-face)  [] RE-EVAL   [x] UV(46510) x 2    [] IONTO  [x] NMR (02334) x  1   [] VASO  [] Manual (07422) x      [] Other:  [x] TA x  1    [] Mech Traction (87818)  [] ES(attended) (72711)      [] ES (un) (62045):     GOALS: 12/20/22  Patient stated goal: return to light light sports    [x] Progressing: [] Met: [x] Not Met: [] Adjusted     Therapist goals for Patient:   Short Term Goals: To be achieved in: 2 -4weeks  1. Independent in HEP and progression per patient tolerance, in order to prevent re-injury. [x] Progressing: [] Met: [x] Not Met: [] Adjusted   2. Patient will have a decrease in pain to facilitate improvement in movement, function, and ADLs as indicated by Functional Deficits. [] Progressing: [x] Met: [] Not Met: [] Adjusted     Long Term Goals: To be achieved in: 12 weeks  1. Foto 62/100 to assist with reaching prior level of function. [x] Progressing: [] Met: [x] Not Met: [] Adjusted  2. Patient will demonstrate increased AROM to = R to allow for proper joint functioning as indicated by patients Functional Deficits. [] Progressing: [x] Met: [] Not Met: [] Adjusted  3. Patient will demonstrate an increase in Strength to good proximal hip strength and control  in LE to allow for proper functional mobility as indicated by patients Functional Deficits. [x] Progressing: [] Met: [x] Not Met: [] Adjusted  4. Patient will return to  functional activities normal heel to toe gait in community, ascend and descend 12 steps alternating without increased symptoms or restriction. [x] Progressing: [] Met: [x] Not Met: [] Adjusted      Overall Progression Towards Functional goals/ Treatment Progress Update:  [x] Patient is progressing as expected towards functional goals listed. [] Progression is slowed due to complexities/Impairments listed.   [] Progression has been slowed due to co-morbidities. [] Plan just implemented, too soon to assess goals progression <30days   [] Goals require adjustment due to lack of progress  [] Patient is not progressing as expected and requires additional follow up with physician  [] Other    Prognosis for POC: [x] Good [] Fair  [] Poor      Patient requires continued skilled intervention: [x] Yes  [] No    Treatment/Activity Tolerance:  [x] Patient able to complete treatment  [] Patient limited by fatigue  [] Patient limited by pain     [] Patient limited by other medical complications  [x] Other: No complaints with today's program.  Re-assessment was completed today. ROM is WNL. Edema is well controlled. Strength is progressing well, however significant quad weakness is still present. Quad atrophy is slowly improving. Requires continued focus on flexibility and strength. Patient Education:  Reviewed diagnosis, POC, HEP and its importance. Access Code: [email or web address]  URL: [email or web address]  Date: 09/09/2022  Prepared by: Cayla Rangel     Exercises  Long Sitting Calf Stretch with Strap - 2-3 x daily - 7 x weekly - 5 reps - 30 hold  Seated Table Hamstring Stretch - 2-3 x daily - 7 x weekly - 5 reps - 30 hold  Long Sitting Quad Set - 2-3 x daily - 7 x weekly - 10 reps - 10 hold  Supine Straight Leg Hip Adduction and Quad Set with Ball - 2-3 x daily - 7 x weekly - 10 reps - 10 hold  Sidelying Hip Abduction - 2-3 x daily - 7 x weekly - 3 sets - 10 reps  Prone Hip Extension on Table - 2-3 x daily - 7 x weekly - 3 sets - 10 reps - 1 hold  Supine Active Straight Leg Raise - 2-3 x daily - 7 x weekly - 3 sets - 10 reps - 1 hold  Standing Hip Flexion AROM - 2-3 x daily - 7 x weekly - 3 sets - 10 reps  Sitting Heel Slide with Towel - 2-3 x daily - 7 x weekly - 10 reps - 10 hold      PLAN:   [x] Continue per plan of care [] Alter current plan (see comments above)  [] Plan of care initiated [] Hold pending MD visit [] Discharge    Progress per surgical restrictions  PT 2x/week for 6 weeks. Electronically signed by:  Nicolasa Chi PT    Note: If patient does not return for scheduled/ recommended follow up visits, this note will serve as a discharge from care along with most recent update on progress.

## 2023-01-04 ENCOUNTER — HOSPITAL ENCOUNTER (OUTPATIENT)
Dept: PHYSICAL THERAPY | Age: 38
Setting detail: THERAPIES SERIES
Discharge: HOME | End: 2023-01-04

## 2023-01-04 NOTE — FLOWSHEET NOTE
The [facility] and [address],  [facility], [facility] [address]  [address] [facility],[address]   Od[place] Mejia  Phone: [phone]  Fax: [phone]      Physical Therapy  Cancellation/No-show Note  Patient Name:  Lenka Aj  :  1985   Date:  2023  Cancelled visits to date: 3  No-shows to date: 0    For today's appointment patient:  [x]  Cancelled  []  Rescheduled appointment  []  No-show     Reason given by patient:  [x]  Patient ill  []  Conflicting appointment   []  No transportation    []  Conflict with work  []  No reason given  []  Other:     Comments:      Electronically signed by:  Shayne Rouse PTA

## 2023-01-06 ENCOUNTER — HOSPITAL ENCOUNTER (OUTPATIENT)
Dept: PHYSICAL THERAPY | Age: 38
Setting detail: THERAPIES SERIES
Discharge: HOME | End: 2023-01-06
Payer: COMMERCIAL

## 2023-01-06 PROCEDURE — 97110 THERAPEUTIC EXERCISES: CPT | Performed by: PHYSICAL THERAPIST

## 2023-01-06 PROCEDURE — 97530 THERAPEUTIC ACTIVITIES: CPT | Performed by: PHYSICAL THERAPIST

## 2023-01-06 PROCEDURE — 97112 NEUROMUSCULAR REEDUCATION: CPT | Performed by: PHYSICAL THERAPIST

## 2023-01-06 NOTE — PLAN OF CARE
The [address] [place] [address],[address], [address], [address] [place] [address]  Phone: [phone]   Fax:     [phone]        Physical Therapy Daily Treatment Note  Date:  2023    Patient Name:  Jaci Majano    :  1985  MRN: [id number]  Restrictions/Precautions:    Medical/Treatment Diagnosis Information:  Diagnosis: 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 SURGEON:  Goyo Dubose MD     ASSISTANT:  Hong Hernandez MD     ANESTHESIA:  General with adductor canal block. PREOPERATIVE DIAGNOSIS:  Focal cartilage defect, medial facet patella. POSTOPERATIVE DIAGNOSES:  Focal cartilage defect, medial facet patella  with synovitis. Insurance/Certification information:  PT Insurance Information: [employer] auth needed  Physician Information:   Goyo Dubose  Has the plan of care been signed (Y/N):        []  Yes  [x]  No     Date of Patient follow up with Physician: per md      Is this a Progress Report:     [x]  Yes  []  No        If Yes:  Date Range for reporting period:  Beginning 22  Ending 22    Progress report will be due (10 Rx or 30 days whichever is less):       Recertification will be due (POC Duration  / 90 days whichever is less): 23         Visit # Insurance Allowable Auth Required    approved 60 per yr   [x]  Yes COHERE []  No        Functional Scale: Foto     Date assessed:         Latex Allergy:  [x]NO      []YES  Preferred Language for Healthcare:   [x]English       []other:    Pain level:  3/10      SUBJECTIVE:  Reports that his knee is doing well overall. Notes that he has not been very compliant with HEP for the past couple of weeks.   He notes tightness in his knee when managing stairs when he first gets up in the AM, however this does improve relatively quickly. OBJECTIVE: 12/20/22    Flexibility L R Comment   Hamstring + mild      Gastroc  -       ITB         Quad                                  ROM PROM AROM Overpressure Comment     L R L R L R     Flexion     140           Extension     +5                                                     Strength L R Comment   Quad 4-/5       Hamstring  4+/5       Gastroc         Hip  flexion  4+/5       Hip abd  4+/5                  Quad   Fair (+)   [email or web address]          Special Test Results/Comment   Meniscal Click     Crepitus  (-)   Flexion Test     Valgus Laxity     Varus Laxity     Lachmans     Drop Back     Homans                  Reflexes/Sensation:               [x]Dermatomes/Myotomes intact               []Reflexes equal and normal bilaterally              []Other:     Joint mobility: PFJ               [x]Normal                       []Hypo              []Hyper     Palpation: (-)     Functional Mobility/Transfers: independent     Posture: WFL for a 30 yr old male      Bandages/Dressings/Incisions: fully healed     Gait: normal without AD     Orthopedic Special Tests:     RESTRICTIONS/PRECAUTIONS: PF cartilage transplant, progress to full WB with TROM locked as quad recruitment improves. TROM locked for gait 0-4 weeks. ROM 0-90  0-4 weeks then gradually increase to full by 8 weeks.       Exercises/Interventions:   Exercise/Equipment Resistance/Repetitions Other comments   Stretching     Hamstring [id number]       Inclined Calf 5x30\"    Hip Flexion     ITB     Groin     Quad          Bike upright 7'         SLR           Adduction           SLR+ 5x30\"         Isometrics           Patellar Glides     Medial     Superior     Inferior          ROM     Sheet Pulls 10x10\"    Hang Weights     Passive     Active     Weight Shift     Ankle Pumps                    CKC     Calf raises  SL  3x10 standing   Wall sits 60 degrees 5x45\"    Step ups 6\" 3x10    1 leg stand     Squatting BOSU 3x10 Held 1/6   CC TKE 90lbs 3x10 Held 1/6   Sidesteps/monster walks purple loop 4 laps each    Sit to stand off table 1 leg up 2 legs down 3x10    Split squat 2x10 each    Balance Biodex maze lvl 5, 3'       Damon maría 2x10    PRE     Extension LAQ 7.5lbs 3x10 RANGE: 90-40   Flexion  RANGE:        Quantum machines     Leg press ecc 135-145# 3x10 60-10    Leg extension ecc 90-40 25lbs 3x10    Leg curl ecc 0-90 125# 3x10         Manual interventions                Therapeutic Exercise and NMR EXR  [x] (81438) Provided verbal/tactile cueing for activities related to strengthening, flexibility, endurance, ROM for improvements in LE, proximal hip, and core control with self care, mobility, lifting, ambulation. [x] (70638) Provided verbal/tactile cueing for activities related to improving balance, coordination, kinesthetic sense, posture, motor skill, proprioception  to assist with LE, proximal hip, and core control in self care, mobility, lifting, ambulation and eccentric single leg control.      NMR and Therapeutic Activities:    [] (55876 or 05359) Provided verbal/tactile cueing for activities related to improving balance, coordination, kinesthetic sense, posture, motor skill, proprioception and motor activation to allow for proper function of core, proximal hip and LE with self care and ADLs  [] (45469) Gait Re-education- Provided training and instruction to the patient for proper LE, core and proximal hip recruitment and positioning and eccentric body weight control with ambulation re-education including up and down stairs     Home Exercise Program:    [x] (55091) Reviewed/Progressed HEP activities related to strengthening, flexibility, endurance, ROM of core, proximal hip and LE for functional self-care, mobility, lifting and ambulation/stair navigation   [] (30536)Reviewed/Progressed HEP activities related to improving balance, coordination, kinesthetic sense, posture, motor skill, proprioception of core, proximal hip and LE for self care, mobility, lifting, and ambulation/stair navigation      Manual Treatments:  PROM / STM / Oscillations-Mobs:  G-I, II, III, IV (PA's, Inf., Post.)  [] (01573) Provided manual therapy to mobilize LE, proximal hip and/or LS spine soft tissue/joints for the purpose of modulating pain, promoting relaxation,  increasing ROM, reducing/eliminating soft tissue swelling/inflammation/restriction, improving soft tissue extensibility and allowing for proper ROM for normal function with self care, mobility, lifting and ambulation. Modalities:  ice 15'    Charges:  Timed Code Treatment Minutes: 47'   Total Treatment Minutes: 8:30-9:50  80'       [] EVAL (LOW) 455 1011 (typically 20 minutes face-to-face)  [] EVAL (MOD) 67878 (typically 30 minutes face-to-face)  [] EVAL (HIGH) 94754 (typically 45 minutes face-to-face)  [] RE-EVAL   [x] VL(09693) x 2    [] IONTO  [x] NMR (47745) x  1   [] VASO  [] Manual (38871) x      [] Other:  [x] TA x  1    [] Mech Traction (40510)  [] ES(attended) (82680)      [] ES (un) (66539):     GOALS: 12/20/22  Patient stated goal: return to light light sports    [x] Progressing: [] Met: [x] Not Met: [] Adjusted     Therapist goals for Patient:   Short Term Goals: To be achieved in: 2 -4weeks  1. Independent in HEP and progression per patient tolerance, in order to prevent re-injury. [x] Progressing: [] Met: [x] Not Met: [] Adjusted   2. Patient will have a decrease in pain to facilitate improvement in movement, function, and ADLs as indicated by Functional Deficits. [] Progressing: [x] Met: [] Not Met: [] Adjusted     Long Term Goals: To be achieved in: 12 weeks  1. Foto 62/100 to assist with reaching prior level of function. [x] Progressing: [] Met: [x] Not Met: [] Adjusted  2. Patient will demonstrate increased AROM to = R to allow for proper joint functioning as indicated by patients Functional Deficits.     [] Progressing: [x] Met: [] Not Met: [] Adjusted  3. Patient will demonstrate an increase in Strength to good proximal hip strength and control  in LE to allow for proper functional mobility as indicated by patients Functional Deficits. [x] Progressing: [] Met: [x] Not Met: [] Adjusted  4. Patient will return to  functional activities normal heel to toe gait in community, ascend and descend 12 steps alternating without increased symptoms or restriction. [x] Progressing: [] Met: [x] Not Met: [] Adjusted      Overall Progression Towards Functional goals/ Treatment Progress Update:  [x] Patient is progressing as expected towards functional goals listed. [] Progression is slowed due to complexities/Impairments listed. [] Progression has been slowed due to co-morbidities. [] Plan just implemented, too soon to assess goals progression <30days   [] Goals require adjustment due to lack of progress  [] Patient is not progressing as expected and requires additional follow up with physician  [] Other    Prognosis for POC: [x] Good [] Fair  [] Poor      Patient requires continued skilled intervention: [x] Yes  [] No    Treatment/Activity Tolerance:  [x] Patient able to complete treatment  [] Patient limited by fatigue  [] Patient limited by pain     [] Patient limited by other medical complications  [x] Other: No complaints with today's program.  Pt fatigued quickly with today's program.  PT advised pt on the importance of compliance with HEP. Minimal discomfort noted with today's program.        Patient Education:  Reviewed diagnosis, POC, HEP and its importance. Access Code: [email or web address]  URL: [email or web address]  Date: 09/09/2022  Prepared by: Kirill Zuluaga     Exercises  Long Sitting Calf Stretch with Strap - 2-3 x daily - 7 x weekly - 5 reps - 30 hold  Seated Table Hamstring Stretch - 2-3 x daily - 7 x weekly - 5 reps - 30 hold  Long Sitting Quad Set - 2-3 x daily - 7 x weekly - 10 reps - 10 hold  Supine Straight Leg Hip Adduction and Quad Set with Ball - 2-3 x daily - 7 x weekly - 10 reps - 10 hold  Sidelying Hip Abduction - 2-3 x daily - 7 x weekly - 3 sets - 10 reps  Prone Hip Extension on Table - 2-3 x daily - 7 x weekly - 3 sets - 10 reps - 1 hold  Supine Active Straight Leg Raise - 2-3 x daily - 7 x weekly - 3 sets - 10 reps - 1 hold  Standing Hip Flexion AROM - 2-3 x daily - 7 x weekly - 3 sets - 10 reps  Sitting Heel Slide with Towel - 2-3 x daily - 7 x weekly - 10 reps - 10 hold      PLAN:   [x] Continue per plan of care [] Alter current plan (see comments above)  [] Plan of care initiated [] Hold pending MD visit [] Discharge    Progress per surgical restrictions  PT 2x/week for 6 weeks. Electronically signed by:  Braulio Abdi PT    Note: If patient does not return for scheduled/ recommended follow up visits, this note will serve as a discharge from care along with most recent update on progress.

## 2023-01-09 ENCOUNTER — HOSPITAL ENCOUNTER (OUTPATIENT)
Dept: PHYSICAL THERAPY | Age: 38
Setting detail: THERAPIES SERIES
Discharge: HOME | End: 2023-01-09
Payer: COMMERCIAL

## 2023-01-09 PROCEDURE — 97110 THERAPEUTIC EXERCISES: CPT | Performed by: PHYSICAL THERAPIST

## 2023-01-09 PROCEDURE — 97112 NEUROMUSCULAR REEDUCATION: CPT | Performed by: PHYSICAL THERAPIST

## 2023-01-09 PROCEDURE — 97530 THERAPEUTIC ACTIVITIES: CPT | Performed by: PHYSICAL THERAPIST

## 2023-01-09 NOTE — FLOWSHEET NOTE
The [address] [place] [address],[address], [address]  Phone: [phone]   Fax:     [phone]        Physical Therapy Daily Treatment Note  Date:  2023    Patient Name:  Ananya Oleary    :  1985  MRN: [id number]  Restrictions/Precautions:    Medical/Treatment Diagnosis Information:  Diagnosis: 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 SURGEON:  Vannesa Hamilton MD     ASSISTANT:  Paul Olguin MD     ANESTHESIA:  General with adductor canal block. PREOPERATIVE DIAGNOSIS:  Focal cartilage defect, medial facet patella. POSTOPERATIVE DIAGNOSES:  Focal cartilage defect, medial facet patella  with synovitis. Insurance/Certification information:  PT Insurance Information: [employer] auth needed  Physician Information:   Vannesa Hamilton  Has the plan of care been signed (Y/N):        []  Yes  [x]  No     Date of Patient follow up with Physician: per md      Is this a Progress Report:     [x]  Yes  []  No        If Yes:  Date Range for reporting period:  Beginning 22  Ending 22    Progress report will be due (10 Rx or 30 days whichever is less):       Recertification will be due (POC Duration  / 90 days whichever is less): 23         Visit # Insurance Allowable Auth Required    approved  3 this year 61 per yr   [x]  Yes COHERE []  No        Functional Scale: Foto     Date assessed:         Latex Allergy:  [x]NO      []YES  Preferred Language for Healthcare:   [x]English       []other:    Pain level:  3/10      SUBJECTIVE:  Reports that his knee is doing well overall.   He notes tightness in his knee when managing stairs when he first gets up in the AM, however this does improve relatively quickly. No new complaints otherwise. OBJECTIVE: 12/20/22    Flexibility L R Comment   Hamstring + mild      Gastroc  -       ITB         Quad                                  ROM PROM AROM Overpressure Comment     L R L R L R     Flexion     140           Extension     +5                                                     Strength L R Comment   Quad 4-/5       Hamstring  4+/5       Gastroc         Hip  flexion  4+/5       Hip abd  4+/5                  Quad   Fair (+)   [email or web address]          Special Test Results/Comment   Meniscal Click     Crepitus  (-)   Flexion Test     Valgus Laxity     Varus Laxity     Lachmans     Drop Back     Homans                  Reflexes/Sensation:               [x]Dermatomes/Myotomes intact               []Reflexes equal and normal bilaterally              []Other:     Joint mobility: PFJ               [x]Normal                       []Hypo              []Hyper     Palpation: (-)     Functional Mobility/Transfers: independent     Posture: WFL for a 30 yr old male      Bandages/Dressings/Incisions: fully healed     Gait: normal without AD     Orthopedic Special Tests:     RESTRICTIONS/PRECAUTIONS: PF cartilage transplant, progress to full WB with TROM locked as quad recruitment improves. TROM locked for gait 0-4 weeks. ROM 0-90  0-4 weeks then gradually increase to full by 8 weeks.       Exercises/Interventions:   Exercise/Equipment Resistance/Repetitions Other comments   Stretching     Hamstring 5x30\"       Inclined Calf 5x30\"    Hip Flexion     ITB     Groin     Quad          Bike upright 7'         SLR           Adduction           SLR+ 5x30\"         Isometrics           Patellar Glides     Medial     Superior     Inferior          ROM     Sheet Pulls 10x10\"    Hang Weights     Passive     Active     Weight Shift     Ankle Pumps                    CKC     Calf raises  SL  3x10 standing   Wall sits 60 degrees 5x45\"    Step ups 6\" 3x10    1 leg stand     Squatting BOSU 3x10 Held 1/6 Sidesteps/monster walks purple loop 4 laps each    Sit to stand off table 1 leg up 2 legs down 3x10    Split squat 3x10 each    Balance Biodex maze lvl 5, 3'       Damon maría 2x10    PRE     Extension LAQ 7.5lbs 3x10 RANGE: 90-40   Flexion  RANGE:        Quantum machines     Leg press ecc 145# 3x10 60-10    Leg extension ecc 90-40 25lbs 3x10    Leg curl ecc 0-90 125# 3x10         Manual interventions                Therapeutic Exercise and NMR EXR  [x] (39329) Provided verbal/tactile cueing for activities related to strengthening, flexibility, endurance, ROM for improvements in LE, proximal hip, and core control with self care, mobility, lifting, ambulation. [x] (06641) Provided verbal/tactile cueing for activities related to improving balance, coordination, kinesthetic sense, posture, motor skill, proprioception  to assist with LE, proximal hip, and core control in self care, mobility, lifting, ambulation and eccentric single leg control.      NMR and Therapeutic Activities:    [] (70137 or 15603) Provided verbal/tactile cueing for activities related to improving balance, coordination, kinesthetic sense, posture, motor skill, proprioception and motor activation to allow for proper function of core, proximal hip and LE with self care and ADLs  [] (31369) Gait Re-education- Provided training and instruction to the patient for proper LE, core and proximal hip recruitment and positioning and eccentric body weight control with ambulation re-education including up and down stairs     Home Exercise Program:    [x] (00397) Reviewed/Progressed HEP activities related to strengthening, flexibility, endurance, ROM of core, proximal hip and LE for functional self-care, mobility, lifting and ambulation/stair navigation   [] (94443)Reviewed/Progressed HEP activities related to improving balance, coordination, kinesthetic sense, posture, motor skill, proprioception of core, proximal hip and LE for self care, mobility, lifting, and ambulation/stair navigation      Manual Treatments:  PROM / STM / Oscillations-Mobs:  G-I, II, III, IV (PA's, Inf., Post.)  [] (91460) Provided manual therapy to mobilize LE, proximal hip and/or LS spine soft tissue/joints for the purpose of modulating pain, promoting relaxation,  increasing ROM, reducing/eliminating soft tissue swelling/inflammation/restriction, improving soft tissue extensibility and allowing for proper ROM for normal function with self care, mobility, lifting and ambulation. Modalities:  ice 15'    Charges:  Timed Code Treatment Minutes: 54'   Total Treatment Minutes: 7:48-8:55  79'       [] EVAL (LOW) 455 1011 (typically 20 minutes face-to-face)  [] EVAL (MOD) 07686 (typically 30 minutes face-to-face)  [] EVAL (HIGH) 49337 (typically 45 minutes face-to-face)  [] RE-EVAL   [x] IQ(63512) x 2    [] IONTO  [x] NMR (53426) x  1   [] VASO  [] Manual (54726) x      [] Other:  [x] TA x  1    [] Mech Traction (52428)  [] ES(attended) (75917)      [] ES (un) (23273):     GOALS: 12/20/22  Patient stated goal: return to light light sports    [x] Progressing: [] Met: [x] Not Met: [] Adjusted     Therapist goals for Patient:   Short Term Goals: To be achieved in: 2 -4weeks  1. Independent in HEP and progression per patient tolerance, in order to prevent re-injury. [x] Progressing: [] Met: [x] Not Met: [] Adjusted   2. Patient will have a decrease in pain to facilitate improvement in movement, function, and ADLs as indicated by Functional Deficits. [] Progressing: [x] Met: [] Not Met: [] Adjusted     Long Term Goals: To be achieved in: 12 weeks  1. Foto 62/100 to assist with reaching prior level of function. [x] Progressing: [] Met: [x] Not Met: [] Adjusted  2. Patient will demonstrate increased AROM to = R to allow for proper joint functioning as indicated by patients Functional Deficits. [] Progressing: [x] Met: [] Not Met: [] Adjusted  3.  Patient will demonstrate an increase in Strength to good proximal hip strength and control  in LE to allow for proper functional mobility as indicated by patients Functional Deficits. [x] Progressing: [] Met: [x] Not Met: [] Adjusted  4. Patient will return to  functional activities normal heel to toe gait in community, ascend and descend 12 steps alternating without increased symptoms or restriction. [x] Progressing: [] Met: [x] Not Met: [] Adjusted      Overall Progression Towards Functional goals/ Treatment Progress Update:  [x] Patient is progressing as expected towards functional goals listed. [] Progression is slowed due to complexities/Impairments listed. [] Progression has been slowed due to co-morbidities. [] Plan just implemented, too soon to assess goals progression <30days   [] Goals require adjustment due to lack of progress  [] Patient is not progressing as expected and requires additional follow up with physician  [] Other    Prognosis for POC: [x] Good [] Fair  [] Poor      Patient requires continued skilled intervention: [x] Yes  [] No    Treatment/Activity Tolerance:  [x] Patient able to complete treatment  [] Patient limited by fatigue  [] Patient limited by pain     [] Patient limited by other medical complications  [x] Other: No complaints with today's program.  Pt fatigued quickly with today's program.  Quad strength deficits are still present. Attempted to add TRX single leg squats, however this created anterior knee pain for the patient, therefore it was held. Quad fatigue noted upon completion of program.      Patient Education:  Reviewed diagnosis, POC, HEP and its importance. Access Code: [email or web address]  URL: [email or web address]  Date: 09/09/2022  Prepared by: Polina Oglesby     Exercises  Long Sitting Calf Stretch with Strap - 2-3 x daily - 7 x weekly - 5 reps - 30 hold  Seated Table Hamstring Stretch - 2-3 x daily - 7 x weekly - 5 reps - 30 hold  Long Sitting Quad Set - 2-3 x daily - 7 x weekly - 10 reps - 10 hold  Supine Straight Leg Hip Adduction and Quad Set with Ball - 2-3 x daily - 7 x weekly - 10 reps - 10 hold  Sidelying Hip Abduction - 2-3 x daily - 7 x weekly - 3 sets - 10 reps  Prone Hip Extension on Table - 2-3 x daily - 7 x weekly - 3 sets - 10 reps - 1 hold  Supine Active Straight Leg Raise - 2-3 x daily - 7 x weekly - 3 sets - 10 reps - 1 hold  Standing Hip Flexion AROM - 2-3 x daily - 7 x weekly - 3 sets - 10 reps  Sitting Heel Slide with Towel - 2-3 x daily - 7 x weekly - 10 reps - 10 hold      PLAN:   [x] Continue per plan of care [] Alter current plan (see comments above)  [] Plan of care initiated [] Hold pending MD visit [] Discharge    Progress per surgical restrictions  PT 2x/week for 6 weeks. Electronically signed by:  Slava Chavez PT    Note: If patient does not return for scheduled/ recommended follow up visits, this note will serve as a discharge from care along with most recent update on progress.

## 2023-01-13 ENCOUNTER — HOSPITAL ENCOUNTER (OUTPATIENT)
Dept: PHYSICAL THERAPY | Age: 38
Setting detail: THERAPIES SERIES
Discharge: HOME | End: 2023-01-13
Payer: COMMERCIAL

## 2023-01-13 PROCEDURE — 97110 THERAPEUTIC EXERCISES: CPT | Performed by: PHYSICAL THERAPIST

## 2023-01-13 PROCEDURE — 97112 NEUROMUSCULAR REEDUCATION: CPT | Performed by: PHYSICAL THERAPIST

## 2023-01-13 PROCEDURE — 97530 THERAPEUTIC ACTIVITIES: CPT | Performed by: PHYSICAL THERAPIST

## 2023-01-13 NOTE — FLOWSHEET NOTE
[facility], [address] [employer] [address] [place] [address], [address] [place] [address]  Phone: [phone]   Fax:     [phone]        Physical Therapy Daily Treatment Note  Date:  2023    Patient Name:  Kashif Ruiz    :  1985  MRN: [id number]  Restrictions/Precautions:    Medical/Treatment Diagnosis Information:  Diagnosis: 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 SURGEON:  Adrian Rouse MD     ASSISTANT:  Janett Regalado MD     ANESTHESIA:  General with adductor canal block. PREOPERATIVE DIAGNOSIS:  Focal cartilage defect, medial facet patella. POSTOPERATIVE DIAGNOSES:  Focal cartilage defect, medial facet patella  with synovitis. Insurance/Certification information:  PT Insurance Information: [employer] auth needed  Physician Information:   Adrian Rouse  Has the plan of care been signed (Y/N):        []  Yes  [x]  No     Date of Patient follow up with Physician: per md      Is this a Progress Report:     [x]  Yes  []  No        If Yes:  Date Range for reporting period:  Beginning 22  Ending 22    Progress report will be due (10 Rx or 30 days whichever is less):       Recertification will be due (POC Duration  / 90 days whichever is less): 23         Visit # Insurance Allowable Auth Required    approved  3 this year 61 per yr   [x]  Yes COHERE []  No        Functional Scale: Foto     Date assessed:         Latex Allergy:  [x]NO      []YES  Preferred Language for Healthcare:   [x]English       []other:    Pain level:  3/10      SUBJECTIVE:  Reports that his knee is doing well overall.   He notes tightness in his knee when managing stairs when he first gets up in the AM, however this does improve relatively quickly. No new complaints otherwise. He does fee like his strength is improving. He does not feel strong enough with stairs to hold his young child while managing stairs. OBJECTIVE: 12/20/22    Flexibility L R Comment   Hamstring + mild      Gastroc  -       ITB         Quad                                  ROM PROM AROM Overpressure Comment     L R L R L R     Flexion     140           Extension     +5                                                     Strength L R Comment   Quad 4-/5       Hamstring  4+/5       Gastroc         Hip  flexion  4+/5       Hip abd  4+/5                  Quad   Fair (+)   [email or web address]@[employer]          Special Test Results/Comment   Meniscal Click     Crepitus  (-)   Flexion Test     Valgus Laxity     Varus Laxity     Lachmans     Drop Back     Homans                  Reflexes/Sensation:               [x]Dermatomes/Myotomes intact               []Reflexes equal and normal bilaterally              []Other:     Joint mobility: PFJ               [x]Normal                       []Hypo              []Hyper     Palpation: (-)     Functional Mobility/Transfers: independent     Posture: WFL for a 30 yr old male      Bandages/Dressings/Incisions: fully healed     Gait: normal without AD     Orthopedic Special Tests:     RESTRICTIONS/PRECAUTIONS: PF cartilage transplant, progress to full WB with TROM locked as quad recruitment improves. TROM locked for gait 0-4 weeks. ROM 0-90  0-4 weeks then gradually increase to full by 8 weeks.       Exercises/Interventions:   Exercise/Equipment Resistance/Repetitions Other comments   Stretching     Hamstring 5x30\"       Inclined Calf 5x30\"    Hip Flexion     ITB     Groin     Quad          Bike upright 7'         SLR           Adduction           SLR+ 5x30\"         Isometrics           Patellar Glides     Medial     Superior     Inferior          ROM        Hang Weights     Passive     Active     Weight Shift     Ankle Pumps                    CKC     Calf raises SL  3x10 standing   Wall sits w/ left toe down 60 degrees 5x30\"    Step ups 6\" 3x10    1 leg stand     Squatting BOSU 3x10 Held 1/6      Sidesteps/monster walks purple loop 4 laps each    Sit to stand off table 1 leg up 2 legs down 3x10    Split squat 3x10 each    Balance Biodex maze lvl 5, 3'       Damon maría 2x10    PRE     Extension LAQ 7.5lbs 3x10 RANGE: 90-40   Flexion  RANGE:        Quantum machines     Leg press # 3x10 70-10    Leg extension ecc 90-40 30lbs 3x10    Leg curl SL 0-90 70# 3x10         Manual interventions                Therapeutic Exercise and NMR EXR  [x] (50279) Provided verbal/tactile cueing for activities related to strengthening, flexibility, endurance, ROM for improvements in LE, proximal hip, and core control with self care, mobility, lifting, ambulation. [x] (01667) Provided verbal/tactile cueing for activities related to improving balance, coordination, kinesthetic sense, posture, motor skill, proprioception  to assist with LE, proximal hip, and core control in self care, mobility, lifting, ambulation and eccentric single leg control.      NMR and Therapeutic Activities:    [] (53035 or 15746) Provided verbal/tactile cueing for activities related to improving balance, coordination, kinesthetic sense, posture, motor skill, proprioception and motor activation to allow for proper function of core, proximal hip and LE with self care and ADLs  [] (79411) Gait Re-education- Provided training and instruction to the patient for proper LE, core and proximal hip recruitment and positioning and eccentric body weight control with ambulation re-education including up and down stairs     Home Exercise Program:    [x] (33636) Reviewed/Progressed HEP activities related to strengthening, flexibility, endurance, ROM of core, proximal hip and LE for functional self-care, mobility, lifting and ambulation/stair navigation   [] (41578)Reviewed/Progressed HEP activities related to improving balance, coordination, kinesthetic sense, posture, motor skill, proprioception of core, proximal hip and LE for self care, mobility, lifting, and ambulation/stair navigation      Manual Treatments:  PROM / STM / Oscillations-Mobs:  G-I, II, III, IV (PA's, Inf., Post.)  [] (80298) Provided manual therapy to mobilize LE, proximal hip and/or LS spine soft tissue/joints for the purpose of modulating pain, promoting relaxation,  increasing ROM, reducing/eliminating soft tissue swelling/inflammation/restriction, improving soft tissue extensibility and allowing for proper ROM for normal function with self care, mobility, lifting and ambulation. Modalities:  ice 15'    Charges:  Timed Code Treatment Minutes: 52'   Total Treatment Minutes: 8:40-9:43  61'       [] EVAL (LOW) [address] (typically 20 minutes face-to-face)  [] EVAL (MOD) [address] (typically 30 minutes face-to-face)  [] EVAL (HIGH) 75451 (typically 45 minutes face-to-face)  [] RE-EVAL   [x] HD(15253) x 1    [] IONTO  [x] NMR (84391) x  1   [] VASO  [] Manual (90994) x      [] Other:  [x] TA x  1    [] Mech Traction (44297)  [] ES(attended) (16167)      [] ES (un) (46892):     GOALS: 12/20/22  Patient stated goal: return to light light sports    [x] Progressing: [] Met: [x] Not Met: [] Adjusted     Therapist goals for Patient:   Short Term Goals: To be achieved in: 2 -4weeks  1. Independent in HEP and progression per patient tolerance, in order to prevent re-injury. [x] Progressing: [] Met: [x] Not Met: [] Adjusted   2. Patient will have a decrease in pain to facilitate improvement in movement, function, and ADLs as indicated by Functional Deficits. [] Progressing: [x] Met: [] Not Met: [] Adjusted     Long Term Goals: To be achieved in: 12 weeks  1. Foto 62/100 to assist with reaching prior level of function. [x] Progressing: [] Met: [x] Not Met: [] Adjusted  2.  Patient will demonstrate increased AROM to = R to allow for proper joint functioning as indicated by patients Functional Deficits. [] Progressing: [x] Met: [] Not Met: [] Adjusted  3. Patient will demonstrate an increase in Strength to good proximal hip strength and control  in LE to allow for proper functional mobility as indicated by patients Functional Deficits. [x] Progressing: [] Met: [x] Not Met: [] Adjusted  4. Patient will return to  functional activities normal heel to toe gait in community, ascend and descend 12 steps alternating without increased symptoms or restriction. [x] Progressing: [] Met: [x] Not Met: [] Adjusted      Overall Progression Towards Functional goals/ Treatment Progress Update:  [x] Patient is progressing as expected towards functional goals listed. [] Progression is slowed due to complexities/Impairments listed. [] Progression has been slowed due to co-morbidities. [] Plan just implemented, too soon to assess goals progression <30days   [] Goals require adjustment due to lack of progress  [] Patient is not progressing as expected and requires additional follow up with physician  [] Other    Prognosis for POC: [x] Good [] Fair  [] Poor      Patient requires continued skilled intervention: [x] Yes  [] No    Treatment/Activity Tolerance:  [x] Patient able to complete treatment  [] Patient limited by fatigue  [] Patient limited by pain     [] Patient limited by other medical complications  [x] Other: No complaints with today's program.   Less quad atrophy noted today compared to several weeks ago. Strength is progressing as expected. Program is always modified to prevent anterior knee discomfort. Requires continues focus on quad strength. Patient Education:  Reviewed diagnosis, POC, HEP and its importance. Access Code: [email or web address]  URL: [email or web address]  Date: 09/09/2022  Prepared by: Jesus Bates     Exercises  Long Sitting Calf Stretch with Strap - 2-3 x daily - 7 x weekly - 5 reps - 30 hold  Seated Table Hamstring Stretch - 2-3 x daily - 7 x weekly - 5 reps - 30 hold  Long Sitting Quad Set - 2-3 x daily - 7 x weekly - 10 reps - 10 hold  Supine Straight Leg Hip Adduction and Quad Set with Ball - 2-3 x daily - 7 x weekly - 10 reps - 10 hold  Sidelying Hip Abduction - 2-3 x daily - 7 x weekly - 3 sets - 10 reps  Prone Hip Extension on Table - 2-3 x daily - 7 x weekly - 3 sets - 10 reps - 1 hold  Supine Active Straight Leg Raise - 2-3 x daily - 7 x weekly - 3 sets - 10 reps - 1 hold  Standing Hip Flexion AROM - 2-3 x daily - 7 x weekly - 3 sets - 10 reps  Sitting Heel Slide with Towel - 2-3 x daily - 7 x weekly - 10 reps - 10 hold      PLAN:   [x] Continue per plan of care [] Alter current plan (see comments above)  [] Plan of care initiated [] Hold pending MD visit [] Discharge    Progress per surgical restrictions  PT 2x/week for 6 weeks. Electronically signed by:  Samara Russell PT    Note: If patient does not return for scheduled/ recommended follow up visits, this note will serve as a discharge from care along with most recent update on progress.

## 2023-01-16 ENCOUNTER — APPOINTMENT (OUTPATIENT)
Dept: PHYSICAL THERAPY | Age: 38
End: 2023-01-16
Payer: COMMERCIAL

## 2023-01-19 ENCOUNTER — HOSPITAL ENCOUNTER (OUTPATIENT)
Dept: PHYSICAL THERAPY | Age: 38
Setting detail: THERAPIES SERIES
Discharge: HOME | End: 2023-01-19
Payer: COMMERCIAL

## 2023-01-19 PROCEDURE — 97110 THERAPEUTIC EXERCISES: CPT | Performed by: PHYSICAL THERAPIST

## 2023-01-19 PROCEDURE — 97530 THERAPEUTIC ACTIVITIES: CPT | Performed by: PHYSICAL THERAPIST

## 2023-01-19 PROCEDURE — 97112 NEUROMUSCULAR REEDUCATION: CPT | Performed by: PHYSICAL THERAPIST

## 2023-01-19 NOTE — FLOWSHEET NOTE
The [address] [place] [address],[address], [address], [address] [place] [address]  Phone: [phone]   Fax:     [phone]        Physical Therapy Daily Treatment Note  Date:  2023    Patient Name:  Lala Corea    :  1985  MRN: [id number]  Restrictions/Precautions:    Medical/Treatment Diagnosis Information:  Diagnosis: 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 SURGEON:  Elisabeth Bergeron MD     ASSISTANT:  Leena Velasquez MD     ANESTHESIA:  General with adductor canal block. PREOPERATIVE DIAGNOSIS:  Focal cartilage defect, medial facet patella. POSTOPERATIVE DIAGNOSES:  Focal cartilage defect, medial facet patella  with synovitis. Insurance/Certification information:  PT Insurance Information: [employer] auth needed  Physician Information:   Elisabeth Bergeron  Has the plan of care been signed (Y/N):        []  Yes  [x]  No     Date of Patient follow up with Physician: per md      Is this a Progress Report:     [x]  Yes  []  No        If Yes:  Date Range for reporting period:  Beginning 22  Ending 22    Progress report will be due (10 Rx or 30 days whichever is less): 47      Recertification will be due (POC Duration  / 90 days whichever is less): 23         Visit # Insurance Allowable Auth Required    approved  3 this year 61 per yr   [x]  Yes COHERE []  No        Functional Scale: Foto     Date assessed:         Latex Allergy:  [x]NO      []YES  Preferred Language for Healthcare:   [x]English       []other:    Pain level:  3/10      SUBJECTIVE:  Reports that his knee is doing well. Notes that he was OOT last weekend. Notes that he did some walking on hilly terrain and his knee did well overall.   He notes that it does not still feel normal and weakness is still present. OBJECTIVE: 12/20/22    Flexibility L R Comment   Hamstring + mild      Gastroc  -       ITB         Quad                                  ROM PROM AROM Overpressure Comment     L R L R L R     Flexion     140           Extension     +5                                                     Strength L R Comment   Quad 4-/5       Hamstring  4+/5       Gastroc         Hip  flexion  4+/5       Hip abd  4+/5                  Quad   Fair (+)   [email or web address]          Special Test Results/Comment   Meniscal Click     Crepitus  (-)   Flexion Test     Valgus Laxity     Varus Laxity     Lachmans     Drop Back     Homans                  Reflexes/Sensation:               [x]Dermatomes/Myotomes intact               []Reflexes equal and normal bilaterally              []Other:     Joint mobility: PFJ               [x]Normal                       []Hypo              []Hyper     Palpation: (-)     Functional Mobility/Transfers: independent     Posture: WFL for a 30 yr old male      Bandages/Dressings/Incisions: fully healed     Gait: normal without AD     Orthopedic Special Tests:     RESTRICTIONS/PRECAUTIONS: PF cartilage transplant, progress to full WB with TROM locked as quad recruitment improves. TROM locked for gait 0-4 weeks. ROM 0-90  0-4 weeks then gradually increase to full by 8 weeks.       Exercises/Interventions:   Exercise/Equipment Resistance/Repetitions Other comments   Stretching     Hamstring 5x30\"       Inclined Calf 5x30\"    Hip Flexion     ITB     Groin     Quad          Bike upright 7'         SLR           Adduction           SLR+ 5x30\"         Isometrics           Patellar Glides     Medial     Superior     Inferior          ROM        Hang Weights     Passive     Active     Weight Shift     Ankle Pumps                    CKC     Calf raises  SL  3x10 standing   Wall sits w/ left toe down 60 degrees 5x30\"    Step ups 6\" 3x10    1 leg stand Squatting BOSU 3x10 Held 1/6      Sidesteps/monster walks purple loop 4 laps each    Sit to stand off table 1 leg up 2 legs down 3x10    Split squat 3x10 each    Balance Biodex maze lvl 5, 3'       Damon maría 2x10    PRE     Extension LAQ 7.5lbs 3x10 RANGE: 90-40   Flexion  RANGE:        Quantum machines     Leg press # 3x10 70-10    Leg extension ecc 90-40 30lbs 3x10    Leg curl SL 0-90 80# 3x10         Manual interventions                Therapeutic Exercise and NMR EXR  [x] (54862) Provided verbal/tactile cueing for activities related to strengthening, flexibility, endurance, ROM for improvements in LE, proximal hip, and core control with self care, mobility, lifting, ambulation. [x] (35502) Provided verbal/tactile cueing for activities related to improving balance, coordination, kinesthetic sense, posture, motor skill, proprioception  to assist with LE, proximal hip, and core control in self care, mobility, lifting, ambulation and eccentric single leg control.      NMR and Therapeutic Activities:    [] (27623 or 18995) Provided verbal/tactile cueing for activities related to improving balance, coordination, kinesthetic sense, posture, motor skill, proprioception and motor activation to allow for proper function of core, proximal hip and LE with self care and ADLs  [] (13069) Gait Re-education- Provided training and instruction to the patient for proper LE, core and proximal hip recruitment and positioning and eccentric body weight control with ambulation re-education including up and down stairs     Home Exercise Program:    [x] (95288) Reviewed/Progressed HEP activities related to strengthening, flexibility, endurance, ROM of core, proximal hip and LE for functional self-care, mobility, lifting and ambulation/stair navigation   [] (21223)Reviewed/Progressed HEP activities related to improving balance, coordination, kinesthetic sense, posture, motor skill, proprioception of core, proximal hip and LE for self care, mobility, lifting, and ambulation/stair navigation      Manual Treatments:  PROM / STM / Oscillations-Mobs:  G-I, II, III, IV (PA's, Inf., Post.)  [] (29532) Provided manual therapy to mobilize LE, proximal hip and/or LS spine soft tissue/joints for the purpose of modulating pain, promoting relaxation,  increasing ROM, reducing/eliminating soft tissue swelling/inflammation/restriction, improving soft tissue extensibility and allowing for proper ROM for normal function with self care, mobility, lifting and ambulation. Modalities:  ice 15'    Charges:  Timed Code Treatment Minutes: 50'   Total Treatment Minutes: 4:05-5:20  76'       [] EVAL (LOW) 455 1011 (typically 20 minutes face-to-face)  [] EVAL (MOD) 71061 (typically 30 minutes face-to-face)  [] EVAL (HIGH) 23057 (typically 45 minutes face-to-face)  [] RE-EVAL   [x] SW(33165) x 1    [] IONTO  [x] NMR (53150) x  1   [] VASO  [] Manual (35587) x      [] Other:  [x] TA x  1    [] Mech Traction (87033)  [] ES(attended) (10203)      [] ES (un) (73439):     GOALS: 12/20/22  Patient stated goal: return to light light sports    [x] Progressing: [] Met: [x] Not Met: [] Adjusted     Therapist goals for Patient:   Short Term Goals: To be achieved in: 2 -4weeks  1. Independent in HEP and progression per patient tolerance, in order to prevent re-injury. [x] Progressing: [] Met: [x] Not Met: [] Adjusted   2. Patient will have a decrease in pain to facilitate improvement in movement, function, and ADLs as indicated by Functional Deficits. [] Progressing: [x] Met: [] Not Met: [] Adjusted     Long Term Goals: To be achieved in: 12 weeks  1. Foto 62/100 to assist with reaching prior level of function. [x] Progressing: [] Met: [x] Not Met: [] Adjusted  2. Patient will demonstrate increased AROM to = R to allow for proper joint functioning as indicated by patients Functional Deficits. [] Progressing: [x] Met: [] Not Met: [] Adjusted  3.  Patient will demonstrate an increase in Strength to good proximal hip strength and control  in LE to allow for proper functional mobility as indicated by patients Functional Deficits. [x] Progressing: [] Met: [x] Not Met: [] Adjusted  4. Patient will return to  functional activities normal heel to toe gait in community, ascend and descend 12 steps alternating without increased symptoms or restriction. [x] Progressing: [] Met: [x] Not Met: [] Adjusted      Overall Progression Towards Functional goals/ Treatment Progress Update:  [x] Patient is progressing as expected towards functional goals listed. [] Progression is slowed due to complexities/Impairments listed. [] Progression has been slowed due to co-morbidities. [] Plan just implemented, too soon to assess goals progression <30days   [] Goals require adjustment due to lack of progress  [] Patient is not progressing as expected and requires additional follow up with physician  [] Other    Prognosis for POC: [x] Good [] Fair  [] Poor      Patient requires continued skilled intervention: [x] Yes  [] No    Treatment/Activity Tolerance:  [x] Patient able to complete treatment  [] Patient limited by fatigue  [] Patient limited by pain     [] Patient limited by other medical complications  [x] Other: No complaints with today's program.  Strength is progressing as expected. Requires continues focus on quad strength. Patient Education:  Reviewed diagnosis, POC, HEP and its importance. Access Code: [email or web address]  URL: [email or web address]  Date: 09/09/2022  Prepared by: Douglas Urrutia     Exercises  Long Sitting Calf Stretch with Strap - 2-3 x daily - 7 x weekly - 5 reps - 30 hold  Seated Table Hamstring Stretch - 2-3 x daily - 7 x weekly - 5 reps - 30 hold  Long Sitting Quad Set - 2-3 x daily - 7 x weekly - 10 reps - 10 hold  Supine Straight Leg Hip Adduction and Quad Set with Ball - 2-3 x daily - 7 x weekly - 10 reps - 10 hold  Sidelying Hip Abduction - 2-3 x daily - 7 x weekly - 3 sets - 10 reps  Prone Hip Extension on Table - 2-3 x daily - 7 x weekly - 3 sets - 10 reps - 1 hold  Supine Active Straight Leg Raise - 2-3 x daily - 7 x weekly - 3 sets - 10 reps - 1 hold  Standing Hip Flexion AROM - 2-3 x daily - 7 x weekly - 3 sets - 10 reps  Sitting Heel Slide with Towel - 2-3 x daily - 7 x weekly - 10 reps - 10 hold      PLAN:   [x] Continue per plan of care [] Alter current plan (see comments above)  [] Plan of care initiated [] Hold pending MD visit [] Discharge    Progress per surgical restrictions  PT 2x/week for 6 weeks. Electronically signed by:  Kamala Nazario PT    Note: If patient does not return for scheduled/ recommended follow up visits, this note will serve as a discharge from care along with most recent update on progress.

## 2023-01-24 ENCOUNTER — HOSPITAL ENCOUNTER (OUTPATIENT)
Dept: PHYSICAL THERAPY | Age: 38
Setting detail: THERAPIES SERIES
Discharge: HOME | End: 2023-01-24
Payer: COMMERCIAL

## 2023-01-24 PROCEDURE — 97110 THERAPEUTIC EXERCISES: CPT | Performed by: PHYSICAL THERAPIST

## 2023-01-24 PROCEDURE — 97530 THERAPEUTIC ACTIVITIES: CPT | Performed by: PHYSICAL THERAPIST

## 2023-01-24 PROCEDURE — 97112 NEUROMUSCULAR REEDUCATION: CPT | Performed by: PHYSICAL THERAPIST

## 2023-01-24 NOTE — FLOWSHEET NOTE
The [address] [place] [address],[address], [address]  Phone: [phone]   Fax:     [phone]        Physical Therapy Daily Treatment Note  Date:  2023    Patient Name:  Elsy Fonseca    :  1985  MRN: [id number]  Restrictions/Precautions:    Medical/Treatment Diagnosis Information:  Diagnosis: 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 SURGEON:  Jaciel Mcelroy MD     ASSISTANT:  Royce Delgado MD     ANESTHESIA:  General with adductor canal block. PREOPERATIVE DIAGNOSIS:  Focal cartilage defect, medial facet patella. POSTOPERATIVE DIAGNOSES:  Focal cartilage defect, medial facet patella  with synovitis. Insurance/Certification information:  PT Insurance Information: [employer] auth needed  Physician Information:   Jaciel Mcelroy  Has the plan of care been signed (Y/N):        []  Yes  [x]  No     Date of Patient follow up with Physician: per md      Is this a Progress Report:     [x]  Yes  []  No        If Yes:  Date Range for reporting period:  Beginning 22  Ending 22    Progress report will be due (10 Rx or 30 days whichever is less): 3/24/83      Recertification will be due (POC Duration  / 90 days whichever is less): 23         Visit # Insurance Allowable Auth Required    approved  4 this year 61 per yr   [x]  Yes COHERE []  No        Functional Scale: Foto     Date assessed:         Latex Allergy:  [x]NO      []YES  Preferred Language for Healthcare:   [x]English       []other:    Pain level:  0/10      SUBJECTIVE:  Reports that his knee is doing well overall. Notes some stiffness over the last few days but no pain.        OBJECTIVE: 22    Flexibility L R Comment   Hamstring + mild      Gastroc  -       ITB         Quad                                  ROM PROM AROM Overpressure Comment     L R L R L R     Flexion     140           Extension     +5                                                     Strength L R Comment   Quad 4-/5       Hamstring  4+/5       Gastroc         Hip  flexion  4+/5       Hip abd  4+/5                  Quad   Fair (+)   [email or web address]          Special Test Results/Comment   Meniscal Click     Crepitus  (-)   Flexion Test     Valgus Laxity     Varus Laxity     Lachmans     Drop Back     Homans                  Reflexes/Sensation:               [x]Dermatomes/Myotomes intact               []Reflexes equal and normal bilaterally              []Other:     Joint mobility: PFJ               [x]Normal                       []Hypo              []Hyper     Palpation: (-)     Functional Mobility/Transfers: independent     Posture: WFL for a 30 yr old male      Bandages/Dressings/Incisions: fully healed     Gait: normal without AD     Orthopedic Special Tests:     RESTRICTIONS/PRECAUTIONS: PF cartilage transplant, progress to full WB with TROM locked as quad recruitment improves. TROM locked for gait 0-4 weeks. ROM 0-90  0-4 weeks then gradually increase to full by 8 weeks.       Exercises/Interventions:   Exercise/Equipment Resistance/Repetitions Other comments   Stretching     Hamstring 5x30\"       Inclined Calf 5x30\"    Hip Flexion     ITB     Groin     Quad          Bike upright 7'         SLR           Adduction           SLR+ 5x30\"         Isometrics           Patellar Glides     Medial     Superior     Inferior          ROM        Hang Weights     Passive     Active     Weight Shift     Ankle Pumps                    CKC     Wall sits w/ left toe down 60 degrees 5x30\"    Step ups 6\" 3x10    1 leg stand     Squatting BOSU 3x10       Sidesteps/monster walks purple loop 4 laps each    Sit to stand off table 1 leg up 2 legs down 3x10    Split squat 3x10 each    Balance Biodex maze lvl 5, 3'       Damon maría 2x10    PRE     RANGE: 90-40   Flexion  RANGE:        Quantum machines     Leg press # 3x10 70-10    Leg extension ecc 90-40 30lbs 3x10    Leg curl SL 0-90 90# 3x10         Manual interventions                Therapeutic Exercise and NMR EXR  [x] (02895) Provided verbal/tactile cueing for activities related to strengthening, flexibility, endurance, ROM for improvements in LE, proximal hip, and core control with self care, mobility, lifting, ambulation. [x] (24719) Provided verbal/tactile cueing for activities related to improving balance, coordination, kinesthetic sense, posture, motor skill, proprioception  to assist with LE, proximal hip, and core control in self care, mobility, lifting, ambulation and eccentric single leg control.      NMR and Therapeutic Activities:    [] (92055 or 78331) Provided verbal/tactile cueing for activities related to improving balance, coordination, kinesthetic sense, posture, motor skill, proprioception and motor activation to allow for proper function of core, proximal hip and LE with self care and ADLs  [] (82881) Gait Re-education- Provided training and instruction to the patient for proper LE, core and proximal hip recruitment and positioning and eccentric body weight control with ambulation re-education including up and down stairs     Home Exercise Program:    [x] (47136) Reviewed/Progressed HEP activities related to strengthening, flexibility, endurance, ROM of core, proximal hip and LE for functional self-care, mobility, lifting and ambulation/stair navigation   [] (81912)Reviewed/Progressed HEP activities related to improving balance, coordination, kinesthetic sense, posture, motor skill, proprioception of core, proximal hip and LE for self care, mobility, lifting, and ambulation/stair navigation      Manual Treatments:  PROM / STM / Oscillations-Mobs:  G-I, II, III, IV (PA's, Inf., Post.)  [] (36717) Provided manual therapy to mobilize LE, proximal hip and/or LS spine soft tissue/joints for the purpose of modulating pain, promoting relaxation,  increasing ROM, reducing/eliminating soft tissue swelling/inflammation/restriction, improving soft tissue extensibility and allowing for proper ROM for normal function with self care, mobility, lifting and ambulation. Modalities:  ice to go    Charges:  Timed Code Treatment Minutes: 46'   Total Treatment Minutes: 4:15-5:25  79'       [] EVAL (LOW) 455 1011 (typically 20 minutes face-to-face)  [] EVAL (MOD) 92030 (typically 30 minutes face-to-face)  [] EVAL (HIGH) 01279 (typically 45 minutes face-to-face)  [] RE-EVAL   [x] QA(89723) x 1    [] IONTO  [x] NMR (87363) x  1   [] VASO  [] Manual (09488) x      [] Other:  [x] TA x  1    [] Mech Traction (34871)  [] ES(attended) (51558)      [] ES (un) (11050):     GOALS: 12/20/22  Patient stated goal: return to light light sports    [x] Progressing: [] Met: [x] Not Met: [] Adjusted     Therapist goals for Patient:   Short Term Goals: To be achieved in: 2 -4weeks  1. Independent in HEP and progression per patient tolerance, in order to prevent re-injury. [x] Progressing: [] Met: [x] Not Met: [] Adjusted   2. Patient will have a decrease in pain to facilitate improvement in movement, function, and ADLs as indicated by Functional Deficits. [] Progressing: [x] Met: [] Not Met: [] Adjusted     Long Term Goals: To be achieved in: 12 weeks  1. Foto 62/100 to assist with reaching prior level of function. [x] Progressing: [] Met: [x] Not Met: [] Adjusted  2. Patient will demonstrate increased AROM to = R to allow for proper joint functioning as indicated by patients Functional Deficits. [] Progressing: [x] Met: [] Not Met: [] Adjusted  3. Patient will demonstrate an increase in Strength to good proximal hip strength and control  in LE to allow for proper functional mobility as indicated by patients Functional Deficits.    [x] Progressing: [] Met: [x] Not Met: [] Adjusted  4. Patient will return to  functional activities normal heel to toe gait in community, ascend and descend 12 steps alternating without increased symptoms or restriction. [x] Progressing: [] Met: [x] Not Met: [] Adjusted      Overall Progression Towards Functional goals/ Treatment Progress Update:  [x] Patient is progressing as expected towards functional goals listed. [] Progression is slowed due to complexities/Impairments listed. [] Progression has been slowed due to co-morbidities. [] Plan just implemented, too soon to assess goals progression <30days   [] Goals require adjustment due to lack of progress  [] Patient is not progressing as expected and requires additional follow up with physician  [] Other    Prognosis for POC: [x] Good [] Fair  [] Poor      Patient requires continued skilled intervention: [x] Yes  [] No    Treatment/Activity Tolerance:  [x] Patient able to complete treatment  [] Patient limited by fatigue  [] Patient limited by pain     [] Patient limited by other medical complications  [x] Other: Reports right knee tension with lunges with right leg forward, which worsens with continued reps but dec with rest.  Strength is progressing as expected; tolerated increase on leg machines. Reported feeling weakness and fatigue with FSUs. Modified Damon Aissatou to floor tap instead of ball toss due to shoulder pain. Requires continues focus on quad strength. Patient Education:  Reviewed diagnosis, POC, HEP and its importance. Access Code: [email or web address]  URL: [email or web address]  Date: 09/09/2022  Prepared by: Terell Bang     Exercises  Long Sitting Calf Stretch with Strap - 2-3 x daily - 7 x weekly - 5 reps - 30 hold  Seated Table Hamstring Stretch - 2-3 x daily - 7 x weekly - 5 reps - 30 hold  Long Sitting Quad Set - 2-3 x daily - 7 x weekly - 10 reps - 10 hold  Supine Straight Leg Hip Adduction and Quad Set with Ball - 2-3 x daily - 7 x weekly - 10 reps - 10 hold  Sidelying Hip Abduction - 2-3 x daily - 7 x weekly - 3 sets - 10 reps  Prone Hip Extension on Table - 2-3 x daily - 7 x weekly - 3 sets - 10 reps - 1 hold  Supine Active Straight Leg Raise - 2-3 x daily - 7 x weekly - 3 sets - 10 reps - 1 hold  Standing Hip Flexion AROM - 2-3 x daily - 7 x weekly - 3 sets - 10 reps  Sitting Heel Slide with Towel - 2-3 x daily - 7 x weekly - 10 reps - 10 hold      PLAN:   [x] Continue per plan of care [] Alter current plan (see comments above)  [] Plan of care initiated [] Hold pending MD visit [] Discharge    Progress per surgical restrictions  PT 2x/week for 6 weeks. Progress note NPV. Electronically signed by:  Kali Alas PT    Note: If patient does not return for scheduled/ recommended follow up visits, this note will serve as a discharge from care along with most recent update on progress.

## 2023-01-26 ENCOUNTER — HOSPITAL ENCOUNTER (OUTPATIENT)
Dept: PHYSICAL THERAPY | Age: 38
Setting detail: THERAPIES SERIES
Discharge: HOME | End: 2023-01-26
Payer: COMMERCIAL

## 2023-01-26 PROCEDURE — 97112 NEUROMUSCULAR REEDUCATION: CPT | Performed by: PHYSICAL THERAPIST

## 2023-01-26 PROCEDURE — 97110 THERAPEUTIC EXERCISES: CPT | Performed by: PHYSICAL THERAPIST

## 2023-01-26 PROCEDURE — 97530 THERAPEUTIC ACTIVITIES: CPT | Performed by: PHYSICAL THERAPIST

## 2023-01-26 NOTE — PLAN OF CARE
[facility], [address] [place] [address], [address] [place] [address]  Phone: [phone]   Fax:     [phone]        Physical Therapy Re-Certification Plan of Liliya Velázquez      Dear  Dr Marii Pratt,    We had the pleasure of treating the following patient for physical therapy services at [address] [place] [address]. A summary of our findings can be found in the updated assessment below. This includes our plan of care. If you have any questions or concerns regarding these findings, please do not hesitate to contact me at the office phone number checked above. Thank you for the referral.     Physician Signature:________________________________Date:__________________  By signing above (or electronic signature), therapists plan is approved by physician    Date Range Of Visits: 22-23  Total Visits to Date: 24  Overall Response to Treatment:   [x]Patient is responding well to treatment and improvement is noted with regards  to goals   []Patient should continue to improve in reasonable time if they continue HEP   []Patient has plateaued and is no longer responding to skilled PT intervention    []Patient is getting worse and would benefit from return to referring MD   []Patient unable to adhere to initial POC   [x]Other: continue PT 2x/week for 6 weeks          Physical Therapy Daily Treatment Note  Date:  2023    Patient Name:  Clarisa Basilio    :  1985  MRN: [id number]  Restrictions/Precautions:    Medical/Treatment Diagnosis Information:  Diagnosis: 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 SURGEON:  Ta Peraza MD     ASSISTANT:  Brunilda Boast MD     ANESTHESIA:  General with adductor canal block.      PREOPERATIVE DIAGNOSIS:  Focal cartilage defect, medial facet patella. POSTOPERATIVE DIAGNOSES:  Focal cartilage defect, medial facet patella  with synovitis. Insurance/Certification information:  PT Insurance Information: Nancy Travis auth needed  Physician Information:   Milo Flores  Has the plan of care been signed (Y/N):        []  Yes  [x]  No     Date of Patient follow up with Physician: per md      Is this a Progress Report:     [x]  Yes  []  No        If Yes:  Date Range for reporting period:  Beginning 12/22/22  Ending 1/26/23    Progress report will be due (10 Rx or 30 days whichever is less): 2/91/17      Recertification will be due (POC Duration  / 90 days whichever is less): 3/9/23         Visit # Insurance Allowable Auth Required   24/28 approved  5 this year 61 per yr   [x]  Yes COHERE []  No        Functional Scale: LEFS 52/80 65%     Date assessed: 1/26/23       Latex Allergy:  [x]NO      []YES  Preferred Language for Healthcare:   [x]English       []other:    Pain level:  0/10      SUBJECTIVE:  Reports that his knee is doing well overall. Reports that his knee was a little sore after last visit, however the soreness was short-lived. Notes that managing stairs is going well now. Continues to avoid anything that would be considered more strenuous.       OBJECTIVE: 1/26/23    Flexibility L R Comment   Hamstring + mild      Gastroc  -       ITB         Quad                                  ROM PROM AROM Overpressure Comment     L R L R L R     Flexion     143           Extension     +6                                                     Strength L R Comment   Quad 4/5       Hamstring  4+/5       Gastroc         Hip  flexion  4+/5       Hip abd  4+/5                  Quad  good   [email or web address]          Special Test Results/Comment   Meniscal Click     Crepitus  (-)   Flexion Test     Valgus Laxity     Varus Laxity     Lachmans     Drop Back     Homans                  Reflexes/Sensation: [x]Dermatomes/Myotomes intact               []Reflexes equal and normal bilaterally              []Other:     Joint mobility: PFJ               [x]Normal                       []Hypo              []Hyper     Palpation: (-)     Functional Mobility/Transfers: independent     Posture: WNL      Bandages/Dressings/Incisions: fully healed     Gait: normal without AD     Orthopedic Special Tests: normal patellar tracking    RESTRICTIONS/PRECAUTIONS: PF cartilage transplant, progress to full WB with TROM locked as quad recruitment improves. TROM locked for gait 0-4 weeks. ROM 0-90  0-4 weeks then gradually increase to full by 8 weeks. Exercises/Interventions:   Exercise/Equipment Resistance/Repetitions Other comments   Stretching     Hamstring 5x30\"       Inclined Calf 5x30\"    Hip Flexion     ITB     Groin     Quad          Bike upright 7'         SLR           Adduction           SLR+ 5x30\"         Isometrics           Patellar Glides     Medial     Superior     Inferior          ROM        Hang Weights     Passive     Active     Weight Shift     Ankle Pumps                    CKC     Wall sits w/ left toe down 60 degrees 5x30\"    Step ups 6\" 3x10    1 leg stand     Squatting BOSU 3x10       Sidesteps/monster walks purple loop 4 laps each    Sit to stand off table 1 leg up 2 legs down 3x10    Split squat 3x10 each    Balance Biodex maze lvl 5, 3'       Damon maría 2x10 Held 1/26   PRE     RANGE: 90-40   Flexion  RANGE:        Quantum machines     Leg press # 3x10 70-10    Leg extension ecc 90-40 30lbs 3x10    Leg curl SL 0-90 95# 3x10         Manual interventions                Therapeutic Exercise and NMR EXR  [x] (09390) Provided verbal/tactile cueing for activities related to strengthening, flexibility, endurance, ROM for improvements in LE, proximal hip, and core control with self care, mobility, lifting, ambulation.   [x] (13781) Provided verbal/tactile cueing for activities related to improving balance, coordination, kinesthetic sense, posture, motor skill, proprioception  to assist with LE, proximal hip, and core control in self care, mobility, lifting, ambulation and eccentric single leg control. NMR and Therapeutic Activities:    [] (09866 or 80123) Provided verbal/tactile cueing for activities related to improving balance, coordination, kinesthetic sense, posture, motor skill, proprioception and motor activation to allow for proper function of core, proximal hip and LE with self care and ADLs  [] (02261) Gait Re-education- Provided training and instruction to the patient for proper LE, core and proximal hip recruitment and positioning and eccentric body weight control with ambulation re-education including up and down stairs     Home Exercise Program:    [x] (18319) Reviewed/Progressed HEP activities related to strengthening, flexibility, endurance, ROM of core, proximal hip and LE for functional self-care, mobility, lifting and ambulation/stair navigation   [] (21055)Reviewed/Progressed HEP activities related to improving balance, coordination, kinesthetic sense, posture, motor skill, proprioception of core, proximal hip and LE for self care, mobility, lifting, and ambulation/stair navigation      Manual Treatments:  PROM / STM / Oscillations-Mobs:  G-I, II, III, IV (PA's, Inf., Post.)  [] (65986) Provided manual therapy to mobilize LE, proximal hip and/or LS spine soft tissue/joints for the purpose of modulating pain, promoting relaxation,  increasing ROM, reducing/eliminating soft tissue swelling/inflammation/restriction, improving soft tissue extensibility and allowing for proper ROM for normal function with self care, mobility, lifting and ambulation.      Modalities:  ice to go    Charges:  Timed Code Treatment Minutes: 46'   Total Treatment Minutes: 4:50-6:00  70'       [] EVAL (LOW) 99330 (typically 20 minutes face-to-face)  [] EVAL (MOD) 14281 (typically 30 minutes face-to-face)  [] EVAL (HIGH) 14913 (typically 45 minutes face-to-face)  [] RE-EVAL   [x] OJ(50595) x 1    [] IONTO  [x] NMR (93835) x  1   [] VASO  [] Manual (40849) x      [] Other:  [x] TA x  1    [] Mech Traction (88157)  [] ES(attended) (44527)      [] ES (un) (77393):     GOALS: 1/26/23  Patient stated goal: return to light light sports    [x] Progressing: [] Met: [x] Not Met: [] Adjusted     Therapist goals for Patient:   Short Term Goals: To be achieved in: 2 -4weeks  1. Independent in HEP and progression per patient tolerance, in order to prevent re-injury. [] Progressing: [x] Met: [] Not Met: [] Adjusted   2. Patient will have a decrease in pain to facilitate improvement in movement, function, and ADLs as indicated by Functional Deficits. [] Progressing: [x] Met: [] Not Met: [] Adjusted     Long Term Goals: To be achieved in: 12 weeks  1. LEFS <20% LOF to assist with reaching prior level of function. [] Progressing: [] Met: [x] Not Met: [x] Adjusted  2. Patient will demonstrate increased AROM to = R to allow for proper joint functioning as indicated by patients Functional Deficits. [] Progressing: [x] Met: [] Not Met: [] Adjusted  3. Patient will demonstrate an increase in Strength to good proximal hip strength and control  in LE to allow for proper functional mobility as indicated by patients Functional Deficits. [x] Progressing: [] Met: [x] Not Met: [] Adjusted  4. Patient will return to  functional activities normal heel to toe gait in community, ascend and descend 12 steps alternating without increased symptoms or restriction. [x] Progressing: [] Met: [x] Not Met: [] Adjusted      Overall Progression Towards Functional goals/ Treatment Progress Update:  [x] Patient is progressing as expected towards functional goals listed. [] Progression is slowed due to complexities/Impairments listed. [] Progression has been slowed due to co-morbidities.   [] Plan just implemented, too soon to assess goals progression <30days [] Goals require adjustment due to lack of progress  [] Patient is not progressing as expected and requires additional follow up with physician  [] Other    Prognosis for POC: [x] Good [] Fair  [] Poor      Patient requires continued skilled intervention: [x] Yes  [] No    Treatment/Activity Tolerance:  [x] Patient able to complete treatment  [] Patient limited by fatigue  [] Patient limited by pain     [] Patient limited by other medical complications  [x] Other: Re-assessment was completed today. ROM is WNL. Mild hamstring flexibility deficits are still present. Quad strength is improving, however deficits are still present. LEFS score demonstrates functional improvements with higher level activities still being limited. Requires continued focus on strength and functional training. Patient Education:  Reviewed diagnosis, POC, HEP and its importance. Access Code: [email or web address]  URL: [email or web address]  Date: 09/09/2022  Prepared by: Nolvia Lee     Exercises  Long Sitting Calf Stretch with Strap - 2-3 x daily - 7 x weekly - 5 reps - 30 hold  Seated Table Hamstring Stretch - 2-3 x daily - 7 x weekly - 5 reps - 30 hold  Long Sitting Quad Set - 2-3 x daily - 7 x weekly - 10 reps - 10 hold  Supine Straight Leg Hip Adduction and Quad Set with Ball - 2-3 x daily - 7 x weekly - 10 reps - 10 hold  Sidelying Hip Abduction - 2-3 x daily - 7 x weekly - 3 sets - 10 reps  Prone Hip Extension on Table - 2-3 x daily - 7 x weekly - 3 sets - 10 reps - 1 hold  Supine Active Straight Leg Raise - 2-3 x daily - 7 x weekly - 3 sets - 10 reps - 1 hold  Standing Hip Flexion AROM - 2-3 x daily - 7 x weekly - 3 sets - 10 reps  Sitting Heel Slide with Towel - 2-3 x daily - 7 x weekly - 10 reps - 10 hold      PLAN:   [x] Continue per plan of care [] Alter current plan (see comments above)  [] Plan of care initiated [] Hold pending MD visit [] Discharge    Progress per surgical restrictions  PT 2x/week for 6 weeks. Electronically signed by:  Victor Manuel Momin PT    Note: If patient does not return for scheduled/ recommended follow up visits, this note will serve as a discharge from care along with most recent update on progress.

## 2023-01-31 ENCOUNTER — HOSPITAL ENCOUNTER (OUTPATIENT)
Dept: PHYSICAL THERAPY | Age: 38
Setting detail: THERAPIES SERIES
Discharge: HOME | End: 2023-01-31
Payer: COMMERCIAL

## 2023-01-31 NOTE — FLOWSHEET NOTE
The [facility] [place],  [facility], [address] [address]  Phone: [phone]  Fax: [phone]      Physical Therapy  Cancellation/No-show Note  Patient Name:  Chad Aguiar  :  1985   Date:  2023  Cancelled visits to date: 0  No-shows to date: 0    For today's appointment patient:  [x]  Cancelled  []  Rescheduled appointment  []  No-show     Reason given by patient:  []  Patient ill  []  Conflicting appointment   []  No transportation    []  Conflict with work  []  No reason given  [x]  Other:  has a sick child at home   Comments:      Electronically signed by:  Ji Araiza PT

## 2023-02-03 ENCOUNTER — HOSPITAL ENCOUNTER (OUTPATIENT)
Dept: PHYSICAL THERAPY | Age: 38
Setting detail: THERAPIES SERIES
Discharge: HOME | End: 2023-02-03

## 2023-02-03 PROCEDURE — 97112 NEUROMUSCULAR REEDUCATION: CPT | Performed by: PHYSICAL THERAPIST

## 2023-02-03 PROCEDURE — 97530 THERAPEUTIC ACTIVITIES: CPT | Performed by: PHYSICAL THERAPIST

## 2023-02-03 PROCEDURE — 97110 THERAPEUTIC EXERCISES: CPT | Performed by: PHYSICAL THERAPIST

## 2023-02-03 NOTE — FLOWSHEET NOTE
[address], [facility] [address] [place] [address], [address] [place] [address]  Phone: [phone]   Fax:     [phone]              Physical Therapy Daily Treatment Note  Date:  2/3/2023    Patient Name:  Ignacio Olson    :  1985  MRN: [id number]  Restrictions/Precautions:    Medical/Treatment Diagnosis Information:  Diagnosis: 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 SURGEON:  Edward Slaughter MD     ASSISTANT:  Porsha Rasmussen MD     ANESTHESIA:  General with adductor canal block. PREOPERATIVE DIAGNOSIS:  Focal cartilage defect, medial facet patella. POSTOPERATIVE DIAGNOSES:  Focal cartilage defect, medial facet patella  with synovitis. Insurance/Certification information:  PT Insurance Information: [employer]here auth needed  Physician Information:   Edward Slaughter  Has the plan of care been signed (Y/N):        []  Yes  [x]  No     Date of Patient follow up with Physician: per md      Is this a Progress Report:     [x]  Yes  []  No        If Yes:  Date Range for reporting period:  Beginning 22  Ending 23    Progress report will be due (10 Rx or 30 days whichever is less):       Recertification will be due (POC Duration  / 90 days whichever is less): 3/9/23         Visit # Insurance Allowable Auth Required    approved  6 this year 60 per yr   [x]  Yes COHERE []  No        Functional Scale: LEFS 52/80 65%     Date assessed: 23       Latex Allergy:  [x]NO      []YES  Preferred Language for Healthcare:   [x]English       []other:    Pain level:  0/10      SUBJECTIVE:  Reports that his knee is doing well overall. Reports that he definitely feels like he is getting stronger. Minimal pain noted throughout the day.   He notes that managing stairs continues to improve, however going down stairs does not feel completely normal yet. OBJECTIVE: 1/26/23    Flexibility L R Comment   Hamstring + mild      Gastroc  -       ITB         Quad                                  ROM PROM AROM Overpressure Comment     L R L R L R     Flexion     143           Extension     +6                                                     Strength L R Comment   Quad 4/5       Hamstring  4+/5       Gastroc         Hip  flexion  4+/5       Hip abd  4+/5                  Quad  good   [email or web address]          Special Test Results/Comment   Meniscal Click     Crepitus  (-)   Flexion Test     Valgus Laxity     Varus Laxity     Lachmans     Drop Back     Homans                  Reflexes/Sensation:               [x]Dermatomes/Myotomes intact               []Reflexes equal and normal bilaterally              []Other:     Joint mobility: PFJ               [x]Normal                       []Hypo              []Hyper     Palpation: (-)     Functional Mobility/Transfers: independent     Posture: WNL      Bandages/Dressings/Incisions: fully healed     Gait: normal without AD     Orthopedic Special Tests: normal patellar tracking    RESTRICTIONS/PRECAUTIONS: PF cartilage transplant, progress to full WB with TROM locked as quad recruitment improves. TROM locked for gait 0-4 weeks. ROM 0-90  0-4 weeks then gradually increase to full by 8 weeks.       Exercises/Interventions:   Exercise/Equipment Resistance/Repetitions Other comments   Stretching     Hamstring 5x30\"       Inclined Calf 5x30\"    Hip Flexion     ITB     Groin     Quad          Bike upright 7'         SLR           Adduction           SLR+ 5x30\"         Isometrics           Patellar Glides     Medial     Superior     Inferior          ROM        Hang Weights     Passive     Active     Weight Shift     Ankle Pumps                    CKC     Wall sits w/ left toe down 60 degrees 5x30\"    Step ups 6\" 3x10    1 leg stand Squatting BOSU 3x10       Sidesteps/monster walks purple loop 4 laps each    Sit to stand off table 1 leg up 2 legs down 3x10    Split squat 3x10 each    Balance Biodex maze lvl 5, 3'       Damon maría 2x10 Held 1/26   PRE     RANGE: 90-40   Flexion  RANGE:        Quantum machines     Leg press # 3x10 70-10    Leg extension ecc 90-40 30lbs 3x10    Leg curl SL 0-90 95# 3x10         Manual interventions                Therapeutic Exercise and NMR EXR  [x] (49780) Provided verbal/tactile cueing for activities related to strengthening, flexibility, endurance, ROM for improvements in LE, proximal hip, and core control with self care, mobility, lifting, ambulation. [x] (09734) Provided verbal/tactile cueing for activities related to improving balance, coordination, kinesthetic sense, posture, motor skill, proprioception  to assist with LE, proximal hip, and core control in self care, mobility, lifting, ambulation and eccentric single leg control.      NMR and Therapeutic Activities:    [] (03385 or 15042) Provided verbal/tactile cueing for activities related to improving balance, coordination, kinesthetic sense, posture, motor skill, proprioception and motor activation to allow for proper function of core, proximal hip and LE with self care and ADLs  [] (12344) Gait Re-education- Provided training and instruction to the patient for proper LE, core and proximal hip recruitment and positioning and eccentric body weight control with ambulation re-education including up and down stairs     Home Exercise Program:    [x] (89640) Reviewed/Progressed HEP activities related to strengthening, flexibility, endurance, ROM of core, proximal hip and LE for functional self-care, mobility, lifting and ambulation/stair navigation   [] (68362)Reviewed/Progressed HEP activities related to improving balance, coordination, kinesthetic sense, posture, motor skill, proprioception of core, proximal hip and LE for self care, mobility, lifting, and ambulation/stair navigation      Manual Treatments:  PROM / STM / Oscillations-Mobs:  G-I, II, III, IV (PA's, Inf., Post.)  [] (96578) Provided manual therapy to mobilize LE, proximal hip and/or LS spine soft tissue/joints for the purpose of modulating pain, promoting relaxation,  increasing ROM, reducing/eliminating soft tissue swelling/inflammation/restriction, improving soft tissue extensibility and allowing for proper ROM for normal function with self care, mobility, lifting and ambulation. Modalities:  ice to go    Charges:  Timed Code Treatment Minutes: 50'   Total Treatment Minutes: 11:00-12:25  80'       [] EVAL (LOW) 455 1011 (typically 20 minutes face-to-face)  [] EVAL (MOD) 78089 (typically 30 minutes face-to-face)  [] EVAL (HIGH) 79977 (typically 45 minutes face-to-face)  [] RE-EVAL   [x] MT(13962) x 1    [] IONTO  [x] NMR (56982) x  1   [] VASO  [] Manual (19612) x      [] Other:  [x] TA x  1    [] Mech Traction (79978)  [] ES(attended) (23391)      [] ES (un) (86269):     GOALS: 1/26/23  Patient stated goal: return to light light sports    [x] Progressing: [] Met: [x] Not Met: [] Adjusted     Therapist goals for Patient:   Short Term Goals: To be achieved in: 2 -4weeks  1. Independent in HEP and progression per patient tolerance, in order to prevent re-injury. [] Progressing: [x] Met: [] Not Met: [] Adjusted   2. Patient will have a decrease in pain to facilitate improvement in movement, function, and ADLs as indicated by Functional Deficits. [] Progressing: [x] Met: [] Not Met: [] Adjusted     Long Term Goals: To be achieved in: 12 weeks  1. LEFS <20% LOF to assist with reaching prior level of function. [] Progressing: [] Met: [x] Not Met: [x] Adjusted  2. Patient will demonstrate increased AROM to = R to allow for proper joint functioning as indicated by patients Functional Deficits. [] Progressing: [x] Met: [] Not Met: [] Adjusted  3.  Patient will demonstrate an increase in Strength to good proximal hip strength and control  in LE to allow for proper functional mobility as indicated by patients Functional Deficits. [x] Progressing: [] Met: [x] Not Met: [] Adjusted  4. Patient will return to  functional activities normal heel to toe gait in community, ascend and descend 12 steps alternating without increased symptoms or restriction. [x] Progressing: [] Met: [x] Not Met: [] Adjusted      Overall Progression Towards Functional goals/ Treatment Progress Update:  [x] Patient is progressing as expected towards functional goals listed. [] Progression is slowed due to complexities/Impairments listed. [] Progression has been slowed due to co-morbidities. [] Plan just implemented, too soon to assess goals progression <30days   [] Goals require adjustment due to lack of progress  [] Patient is not progressing as expected and requires additional follow up with physician  [] Other    Prognosis for POC: [x] Good [] Fair  [] Poor      Patient requires continued skilled intervention: [x] Yes  [] No    Treatment/Activity Tolerance:  [x] Patient able to complete treatment  [] Patient limited by fatigue  [] Patient limited by pain     [] Patient limited by other medical complications  [x] Other: No complaints with today's program.  Pt was challenged by strength program.  Minimal progressions were made today. Patient Education:  Reviewed diagnosis, POC, HEP and its importance. Access Code: [email or web address]  URL: [email or web address]  Date: 09/09/2022  Prepared by: Abhijit Elizabeth     Exercises  Long Sitting Calf Stretch with Strap - 2-3 x daily - 7 x weekly - 5 reps - 30 hold  Seated Table Hamstring Stretch - 2-3 x daily - 7 x weekly - 5 reps - 30 hold  Long Sitting Quad Set - 2-3 x daily - 7 x weekly - 10 reps - 10 hold  Supine Straight Leg Hip Adduction and Quad Set with Ball - 2-3 x daily - 7 x weekly - 10 reps - 10 hold  Sidelying Hip Abduction - 2-3 x daily - 7 x weekly - 3 sets - 10 reps  Prone Hip Extension on Table - 2-3 x daily - 7 x weekly - 3 sets - 10 reps - 1 hold  Supine Active Straight Leg Raise - 2-3 x daily - 7 x weekly - 3 sets - 10 reps - 1 hold  Standing Hip Flexion AROM - 2-3 x daily - 7 x weekly - 3 sets - 10 reps  Sitting Heel Slide with Towel - 2-3 x daily - 7 x weekly - 10 reps - 10 hold      PLAN:   [x] Continue per plan of care [] Alter current plan (see comments above)  [] Plan of care initiated [] Hold pending MD visit [] Discharge    Progress per surgical restrictions  PT 2x/week for 6 weeks. Electronically signed by:  Sofia Palafox, PT    Note: If patient does not return for scheduled/ recommended follow up visits, this note will serve as a discharge from care along with most recent update on progress.

## 2023-02-07 ENCOUNTER — APPOINTMENT (OUTPATIENT)
Dept: PHYSICAL THERAPY | Age: 38
End: 2023-02-07
Payer: COMMERCIAL

## 2023-02-07 ENCOUNTER — HOSPITAL ENCOUNTER (OUTPATIENT)
Dept: PHYSICAL THERAPY | Age: 38
Setting detail: THERAPIES SERIES
Discharge: HOME | End: 2023-02-07
Payer: COMMERCIAL

## 2023-02-07 PROCEDURE — 97530 THERAPEUTIC ACTIVITIES: CPT | Performed by: PHYSICAL THERAPIST

## 2023-02-07 PROCEDURE — 97110 THERAPEUTIC EXERCISES: CPT | Performed by: PHYSICAL THERAPIST

## 2023-02-07 NOTE — FLOWSHEET NOTE
[address], [address] [employer] [address] [place] [address]  Phone: [phone]   Fax:     [phone]              Physical Therapy Daily Treatment Note  Date:  2023    Patient Name:  Yuniel Keenan    :  1985  MRN: [id number]  Restrictions/Precautions:    Medical/Treatment Diagnosis Information:  Diagnosis: 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 SURGEON:  Elliott Lanza MD     ASSISTANT:  Candi Rudolph MD     ANESTHESIA:  General with adductor canal block. PREOPERATIVE DIAGNOSIS:  Focal cartilage defect, medial facet patella. POSTOPERATIVE DIAGNOSES:  Focal cartilage defect, medial facet patella  with synovitis. Insurance/Certification information:  PT Insurance Information: [employer]here auth needed  Physician Information:   Elliott Lanza  Has the plan of care been signed (Y/N):        []  Yes  [x]  No     Date of Patient follow up with Physician: per md      Is this a Progress Report:     [x]  Yes  []  No        If Yes:  Date Range for reporting period:  Beginning 22  Ending 23    Progress report will be due (10 Rx or 30 days whichever is less):       Recertification will be due (POC Duration  / 90 days whichever is less): 3/9/23         Visit # Insurance Allowable Auth Required    approved  7 this year 61 per yr   [x]  Yes COHERE []  No        Functional Scale: LEFS 52/80 65%     Date assessed: 23       Latex Allergy:  [x]NO      []YES  Preferred Language for Healthcare:   [x]English       []other:    Pain level:  0/10      SUBJECTIVE:  Reports that his knee is doing well overall.   Reports that he notes that does still have some difficulty with managing stairs when he is carrying his infant child. He notes that going down stairs is more challenging than going up stairs. OBJECTIVE: 1/26/23    Flexibility L R Comment   Hamstring + mild      Gastroc  -       ITB         Quad                                  ROM PROM AROM Overpressure Comment     L R L R L R     Flexion     143           Extension     +6                                                     Strength L R Comment   Quad 4/5       Hamstring  4+/5       Gastroc         Hip  flexion  4+/5       Hip abd  4+/5                  Quad  good   [email or web address]          Special Test Results/Comment   Meniscal Click     Crepitus  (-)   Flexion Test     Valgus Laxity     Varus Laxity     Lachmans     Drop Back     Homans                  Reflexes/Sensation:               [x]Dermatomes/Myotomes intact               []Reflexes equal and normal bilaterally              []Other:     Joint mobility: PFJ               [x]Normal                       []Hypo              []Hyper     Palpation: (-)     Functional Mobility/Transfers: independent     Posture: WNL      Bandages/Dressings/Incisions: fully healed     Gait: normal without AD     Orthopedic Special Tests: normal patellar tracking    RESTRICTIONS/PRECAUTIONS: PF cartilage transplant, progress to full WB with TROM locked as quad recruitment improves. TROM locked for gait 0-4 weeks. ROM 0-90  0-4 weeks then gradually increase to full by 8 weeks.       Exercises/Interventions:   Exercise/Equipment Resistance/Repetitions Other comments   Stretching     Hamstring 5x30\"       Inclined Calf 5x30\"    Hip Flexion     ITB     Groin     Quad          Bike upright 7'         SLR           Adduction           SLR+ 5x30\"         Isometrics           Patellar Glides     Medial     Superior     Inferior          ROM        Hang Weights     Passive     Active     Weight Shift     Ankle Pumps                    CKC     Wall sits w/ left toe down 60 degrees 5x30\"    Step ups 6\" 3x10    1 leg stand     Squatting BOSU 3x10 Sidesteps/monster walks purple loop 4 laps each    Sit to stand off table 1 leg up 2 legs down 3x10    Split squat 3x10 each    Balance Biodex maze lvl 5, 3'       Damon maría 2x10 Held 1/26   PRE     RANGE: 90-40   Flexion  RANGE:        Quantum machines     Leg press # 3x10 70-10    Leg extension ecc 90-40 40lbs 3x10    Leg curl SL 0-90 100# 3x10         Manual interventions                Therapeutic Exercise and NMR EXR  [x] (13148) Provided verbal/tactile cueing for activities related to strengthening, flexibility, endurance, ROM for improvements in LE, proximal hip, and core control with self care, mobility, lifting, ambulation. [x] (39504) Provided verbal/tactile cueing for activities related to improving balance, coordination, kinesthetic sense, posture, motor skill, proprioception  to assist with LE, proximal hip, and core control in self care, mobility, lifting, ambulation and eccentric single leg control.      NMR and Therapeutic Activities:    [] (20254 or 35212) Provided verbal/tactile cueing for activities related to improving balance, coordination, kinesthetic sense, posture, motor skill, proprioception and motor activation to allow for proper function of core, proximal hip and LE with self care and ADLs  [] (82680) Gait Re-education- Provided training and instruction to the patient for proper LE, core and proximal hip recruitment and positioning and eccentric body weight control with ambulation re-education including up and down stairs     Home Exercise Program:    [x] (65489) Reviewed/Progressed HEP activities related to strengthening, flexibility, endurance, ROM of core, proximal hip and LE for functional self-care, mobility, lifting and ambulation/stair navigation   [] (21934)Reviewed/Progressed HEP activities related to improving balance, coordination, kinesthetic sense, posture, motor skill, proprioception of core, proximal hip and LE for self care, mobility, lifting, and ambulation/stair navigation      Manual Treatments:  PROM / STM / Oscillations-Mobs:  G-I, II, III, IV (PA's, Inf., Post.)  [] (09256) Provided manual therapy to mobilize LE, proximal hip and/or LS spine soft tissue/joints for the purpose of modulating pain, promoting relaxation,  increasing ROM, reducing/eliminating soft tissue swelling/inflammation/restriction, improving soft tissue extensibility and allowing for proper ROM for normal function with self care, mobility, lifting and ambulation. Modalities:  ice to go    Charges:  Timed Code Treatment Minutes: 30'   Total Treatment Minutes: 5:45-6:15  30'       [] EVAL (LOW) 455 1011 (typically 20 minutes face-to-face)  [] EVAL (MOD) 31316 (typically 30 minutes face-to-face)  [] EVAL (HIGH) 52138 (typically 45 minutes face-to-face)  [] RE-EVAL   [x] WD(39159) x 1    [] IONTO  [] NMR (26671) x     [] VASO  [] Manual (13662) x      [] Other:  [x] TA x  1    [] Mech Traction (02397)  [] ES(attended) (75778)      [] ES (un) (34884):     GOALS: 1/26/23  Patient stated goal: return to light light sports    [x] Progressing: [] Met: [x] Not Met: [] Adjusted     Therapist goals for Patient:   Short Term Goals: To be achieved in: 2 -4weeks  1. Independent in HEP and progression per patient tolerance, in order to prevent re-injury. [] Progressing: [x] Met: [] Not Met: [] Adjusted   2. Patient will have a decrease in pain to facilitate improvement in movement, function, and ADLs as indicated by Functional Deficits. [] Progressing: [x] Met: [] Not Met: [] Adjusted     Long Term Goals: To be achieved in: 12 weeks  1. LEFS <20% LOF to assist with reaching prior level of function. [] Progressing: [] Met: [x] Not Met: [x] Adjusted  2. Patient will demonstrate increased AROM to = R to allow for proper joint functioning as indicated by patients Functional Deficits. [] Progressing: [x] Met: [] Not Met: [] Adjusted  3.  Patient will demonstrate an increase in Strength to good proximal hip strength and control  in LE to allow for proper functional mobility as indicated by patients Functional Deficits. [x] Progressing: [] Met: [x] Not Met: [] Adjusted  4. Patient will return to  functional activities normal heel to toe gait in community, ascend and descend 12 steps alternating without increased symptoms or restriction. [x] Progressing: [] Met: [x] Not Met: [] Adjusted      Overall Progression Towards Functional goals/ Treatment Progress Update:  [x] Patient is progressing as expected towards functional goals listed. [] Progression is slowed due to complexities/Impairments listed. [] Progression has been slowed due to co-morbidities. [] Plan just implemented, too soon to assess goals progression <30days   [] Goals require adjustment due to lack of progress  [] Patient is not progressing as expected and requires additional follow up with physician  [] Other    Prognosis for POC: [x] Good [] Fair  [] Poor      Patient requires continued skilled intervention: [x] Yes  [] No    Treatment/Activity Tolerance:  [x] Patient able to complete treatment  [] Patient limited by fatigue  [] Patient limited by pain     [] Patient limited by other medical complications  [x] Other: No complaints with today's program.  Program was limited by pt time constraints. We focused primarily on exercises of which he would not have access to at home. Significant quad strength deficits are still present. Patient Education:  Reviewed diagnosis, POC, HEP and its importance. Access Code: [email or web address]  URL: [email or web address]  Date: 09/09/2022  Prepared by: Nany Linn     Exercises  Long Sitting Calf Stretch with Strap - 2-3 x daily - 7 x weekly - 5 reps - 30 hold  Seated Table Hamstring Stretch - 2-3 x daily - 7 x weekly - 5 reps - 30 hold  Long Sitting Quad Set - 2-3 x daily - 7 x weekly - 10 reps - 10 hold  Supine Straight Leg Hip Adduction and Quad Set with Ball - 2-3 x daily - 7 x weekly - 10 reps - 10 hold  Sidelying Hip Abduction - 2-3 x daily - 7 x weekly - 3 sets - 10 reps  Prone Hip Extension on Table - 2-3 x daily - 7 x weekly - 3 sets - 10 reps - 1 hold  Supine Active Straight Leg Raise - 2-3 x daily - 7 x weekly - 3 sets - 10 reps - 1 hold  Standing Hip Flexion AROM - 2-3 x daily - 7 x weekly - 3 sets - 10 reps  Sitting Heel Slide with Towel - 2-3 x daily - 7 x weekly - 10 reps - 10 hold      PLAN:   [x] Continue per plan of care [] Alter current plan (see comments above)  [] Plan of care initiated [] Hold pending MD visit [] Discharge    Progress per surgical restrictions  PT 2x/week for 6 weeks. Electronically signed by:  Douglas Mcallister PT    Note: If patient does not return for scheduled/ recommended follow up visits, this note will serve as a discharge from care along with most recent update on progress.

## 2023-02-10 ENCOUNTER — APPOINTMENT (OUTPATIENT)
Dept: PHYSICAL THERAPY | Age: 38
End: 2023-02-10
Payer: COMMERCIAL

## 2023-02-20 ENCOUNTER — OFFICE VISIT (OUTPATIENT)
Dept: ORTHOPEDIC SURGERY | Age: 38
End: 2023-02-20
Payer: COMMERCIAL

## 2023-02-20 VITALS — HEIGHT: 74 IN | WEIGHT: 200 LBS | BODY MASS INDEX: 25.67 KG/M2

## 2023-02-20 DIAGNOSIS — M23.8X2 CHONDRAL DEFECT OF LEFT PATELLA: Primary | ICD-10-CM

## 2023-02-20 PROCEDURE — 99213 OFFICE O/P EST LOW 20 MIN: CPT | Performed by: ORTHOPAEDIC SURGERY

## 2023-02-20 NOTE — PROGRESS NOTES
Chief complaint left knee pain. Patient is 53-year-old gentleman who is 6 months following a chondral allograft for a patella cartilage defect. He comes in today for follow-up evaluation. Overall reports that he is doing well. He has slight discomfort when going up stairs and carrying his child. Otherwise he notes that his knee feels good. He has not had any return of the crepitus that was previously present. Past Medical History:   Diagnosis Date    Environmental allergies 6/14/2021        Past Surgical History:   Procedure Laterality Date    ANTERIOR CRUCIATE LIGAMENT REPAIR  03/22/2012    left knee    INGUINAL HERNIA REPAIR Right     KNEE ARTHROSCOPY Left 8/30/2022    LEFT KNEE ARTHROSCOPY, CHONDROPLASTY, SYNOVECTIONY, OPEN PATELLA CARTILAGE TRANSPLANT performed by Hue Sepulveda MD at [address] [facility] ARTHROSCOPY W/ ACL RECONSTRUCTION Left     KNEE ARTHROTOMY Left 8/30/2022    . 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, helping [employer]   Tobacco Use    Smoking status: Never    Smokeless tobacco: Never   Substance and Sexual Activity    Alcohol use:  Yes     Alcohol/week: 0.0 standard drinks     Types: 5 - 8 Cans of beer per week     Comment: weekends    Drug use: No    Sexual activity: Yes     Partners: Female     Social Determinants of Health     Financial Resource Strain: Low Risk     Difficulty of Paying Living Expenses: Not hard at all   Food Insecurity: No Food Insecurity    Worried About Running Out of Food in the Last Year: Never true    Ran Out of Food in the Last Year: Never true       Current Outpatient Medications   Medication Sig Dispense Refill    clobetasol (Sherita Mcardle) 0.05 % ointment Apply topically 2 times daily. 30 g 2    Cetirizine HCl (ZYRTEC PO) Take by mouth      fluticasone (FLONASE) 50 MCG/ACT nasal spray 1 spray by Each Nostril route daily       No current facility-administered medications for this visit. No Known Allergies    Vital signs:  Ht 6' 2\" (1.88 m)   Wt 200 lb (90.7 kg)   BMI 25.68 kg/m²      Constitution: Patient cooperative with examination today. Well-developed, well-nourished in no acute distress. Neuro: Alert & oriented x 3,  no focal motor or sensory deficits noted. Eyes: sclera clear, atraumatic  Ears: Normal external ear  Mouth:  No perioral lesions  Pulm: Respirations unlabored and regular  Pulse: Extremities well-perfused. 2+ peripheral pulses   Skin: Warm, no ulcerations      Examination the patient's left knee shows he has moderate quadricep atrophy. Trace effusion. Full range of motion. No patella crepitus. No medial or lateral joint line tenderness. The knee is stable. The calf is soft and nontender. Skin warm and well perfused. Impression overall patient is doing very well today following his chondral allograft. He will continue with quadricep strengthening and the importance of quadricep strengthening was again emphasized to him. The nature of the patient's disorder was discussed. Surgical and nonsurgical alternatives/options were discussed. Use of anti-inflammatory medications as well as injections were discussed. Radiographic studies were reviewed. Patient was educated with regard to treatment options and questions were answered. He will see me for follow-up evaluation in 2 months. The total time spent for patient evaluation, education and development of a treatment plan was: 25 minutes. I personally reviewed the patient's pain scale, review of systems, family history, social history, past medical history, allergies and medications.   Review of systems was collected today, reviewed and is included in the medical record. It is available under the media tab. Darci Escobar MD  Sports Medicine, Knee and Shoulder Surgery    This dictation was performed with a verbal recognition program [employer]) and it was checked for errors. It is possible that there are still dictated errors within this office note. If so, please bring any errors to my attention for an addendum. All efforts were made to ensure that this office note is accurate.

## 2023-02-28 ENCOUNTER — OFFICE VISIT (OUTPATIENT)
Dept: PRIMARY CARE CLINIC | Age: 38
End: 2023-02-28
Payer: COMMERCIAL

## 2023-02-28 VITALS
TEMPERATURE: 97.7 F | DIASTOLIC BLOOD PRESSURE: 62 MMHG | SYSTOLIC BLOOD PRESSURE: 102 MMHG | BODY MASS INDEX: 27.75 KG/M2 | WEIGHT: 223.2 LBS | HEIGHT: 75 IN | OXYGEN SATURATION: 96 % | HEART RATE: 80 BPM

## 2023-02-28 DIAGNOSIS — G89.29 CHRONIC RIGHT SHOULDER PAIN: ICD-10-CM

## 2023-02-28 DIAGNOSIS — J06.9 VIRAL URI: ICD-10-CM

## 2023-02-28 DIAGNOSIS — K92.1 BLOOD IN STOOL: ICD-10-CM

## 2023-02-28 DIAGNOSIS — M25.511 CHRONIC RIGHT SHOULDER PAIN: ICD-10-CM

## 2023-02-28 DIAGNOSIS — K62.89 ANAL OR RECTAL PAIN: Primary | ICD-10-CM

## 2023-02-28 PROCEDURE — 99214 OFFICE O/P EST MOD 30 MIN: CPT | Performed by: FAMILY MEDICINE

## 2023-02-28 SDOH — ECONOMIC STABILITY: FOOD INSECURITY: WITHIN THE PAST 12 MONTHS, YOU WORRIED THAT YOUR FOOD WOULD RUN OUT BEFORE YOU GOT MONEY TO BUY MORE.: NEVER TRUE

## 2023-02-28 SDOH — ECONOMIC STABILITY: HOUSING INSECURITY
IN THE LAST 12 MONTHS, WAS THERE A TIME WHEN YOU DID NOT HAVE A STEADY PLACE TO SLEEP OR SLEPT IN A SHELTER (INCLUDING NOW)?: NO

## 2023-02-28 SDOH — ECONOMIC STABILITY: FOOD INSECURITY: WITHIN THE PAST 12 MONTHS, THE FOOD YOU BOUGHT JUST DIDN'T LAST AND YOU DIDN'T HAVE MONEY TO GET MORE.: NEVER TRUE

## 2023-02-28 SDOH — ECONOMIC STABILITY: INCOME INSECURITY: HOW HARD IS IT FOR YOU TO PAY FOR THE VERY BASICS LIKE FOOD, HOUSING, MEDICAL CARE, AND HEATING?: NOT HARD AT ALL

## 2023-02-28 ASSESSMENT — PATIENT HEALTH QUESTIONNAIRE - PHQ9
SUM OF ALL RESPONSES TO PHQ QUESTIONS 1-9: 0
SUM OF ALL RESPONSES TO PHQ QUESTIONS 1-9: 0
1. LITTLE INTEREST OR PLEASURE IN DOING THINGS: 0
SUM OF ALL RESPONSES TO PHQ QUESTIONS 1-9: 0
2. FEELING DOWN, DEPRESSED OR HOPELESS: 0
SUM OF ALL RESPONSES TO PHQ9 QUESTIONS 1 & 2: 0
SUM OF ALL RESPONSES TO PHQ QUESTIONS 1-9: 0

## 2023-02-28 NOTE — PROGRESS NOTES
[address] [facility] [address] PRIMARY CARE  [address] [facility] [address]  Dept: [phone]  Dept Fax: [phone]     2/28/2023      Selina Li Layne Avery   1985     Chief Complaint   Patient presents with    Pharyngitis     Congestion, better today,  R Shoulder injury,  hemorrhoids       HPI  Pt comes in today for    URI: Sore throat and congestion. Not really a whole bunch of other symptoms. Not really taking any medications. Improving. R SHOULDER: Injured many years ago, but more recent injury. Limited motion and pain with certain things. Some weakness. Seems to improved, but not improving further. Not limiting a lot. GI: Feeling like painful blood in the stool. Pain limited to passage of stool and very shortly after. He notes this being cyclical for years. On average having these symptoms 5-10 days per month. PHQ Scores 2/28/2023 8/11/2022 6/14/2021   PHQ2 Score 0 0 0   PHQ9 Score 0 0 0     Interpretation of Total Score Depression Severity: 1-4 = Minimal depression, 5-9 = Mild depression, 10-14 = Moderate depression, 15-19 = Moderately severe depression, 20-27 = Severe depression     Prior to Visit Medications    Medication Sig Taking? Authorizing Provider   Cetirizine HCl (ZYRTEC PO) Take by mouth  Historical Provider, MD   fluticasone (FLONASE) 50 MCG/ACT nasal spray 1 spray by Each Nostril route daily  Historical Provider, MD       Past Medical History:   Diagnosis Date    Environmental allergies 6/14/2021        Social History     Tobacco Use    Smoking status: Never    Smokeless tobacco: Never   Substance Use Topics    Alcohol use:  Yes     Alcohol/week: 0.0 standard drinks     Types: 5 - 8 Cans of beer per week     Comment: weekends    Drug use: No        Past Surgical History:   Procedure Laterality Date    ANTERIOR CRUCIATE LIGAMENT REPAIR  03/22/2012    left knee    INGUINAL HERNIA REPAIR Right     KNEE ARTHROSCOPY Left 8/30/2022    LEFT KNEE ARTHROSCOPY, CHONDROPLASTY, SYNOVECTIONY, OPEN PATELLA CARTILAGE TRANSPLANT performed by Robert Resendiz MD at [address] [facility] ARTHROSCOPY W/ ACL RECONSTRUCTION Left     KNEE ARTHROTOMY Left 8/30/2022    . performed by Robert Resendiz MD at [facility] OR        No Known Allergies     Family History   Problem Relation Age of Onset    Arrhythmia Mother         A Fib    No Known Problems Brother     Heart Disease Maternal Grandfather         Patient's past medical history, surgical history, family history, medications, and allergies  were all reviewed and updated as appropriate today. Review of systems per HPI above, otherwise negative. /62 (Cuff Size: Medium Adult)   Pulse 80   Temp 97.7 °F (36.5 °C) (Oral)   Ht 6' 3\" (1.905 m)   Wt 223 lb 3.2 oz (101.2 kg)   SpO2 96% Comment: room air  BMI 27.90 kg/m²      Physical Exam  Vitals reviewed. Constitutional:       General: He is not in acute distress. Appearance: Normal appearance. He is well-developed and normal weight. HENT:      Head: Normocephalic and atraumatic. Right Ear: Tympanic membrane and ear canal normal. No drainage. No middle ear effusion. Tympanic membrane is not erythematous. Left Ear: Tympanic membrane and ear canal normal. No drainage. No middle ear effusion. Tympanic membrane is not erythematous. Nose: Nose normal. No rhinorrhea. Mouth/Throat:      Mouth: Mucous membranes are moist.      Pharynx: No oropharyngeal exudate or posterior oropharyngeal erythema. Eyes:      Extraocular Movements: Extraocular movements intact. Pupils: Pupils are equal, round, and reactive to light. Neck:      Thyroid: No thyromegaly. Cardiovascular:      Rate and Rhythm: Normal rate and regular rhythm. Heart sounds: No murmur heard. Pulmonary:      Effort: Pulmonary effort is normal.      Breath sounds: Normal breath sounds. No wheezing. Abdominal:      General: Bowel sounds are normal.      Palpations: Abdomen is soft.  There is no mass. Tenderness: There is no abdominal tenderness. Musculoskeletal:         General: No swelling. Normal range of motion. Right shoulder: No tenderness, bony tenderness or crepitus. Normal range of motion. Normal strength. Cervical back: Neck supple. Lymphadenopathy:      Cervical: No cervical adenopathy. Skin:     General: Skin is warm and dry. Findings: No rash. Neurological:      General: No focal deficit present. Mental Status: He is alert and oriented to person, place, and time. Cranial Nerves: No cranial nerve deficit. Psychiatric:         Mood and Affect: Mood normal.       Assessment:  Encounter Diagnoses   Name Primary? Anal or rectal pain Yes    Blood in stool     Chronic right shoulder pain     Viral URI        Plan:  1. Anal or rectal pain  Newly discussed concerns that is intermittent. See HPI above. I suspect this is related to fissure VS external hemorrhoid. Education provided and would have him see CRS if desires. - Rome Perera MD, Colorectal Surgery, [facility]    2. Blood in stool  See above. - Rome Perera MD, Colorectal Surgery, [facility]    3. Chronic right shoulder pain  Nontraumatic joint pain, suspect impingement syndrome by clinical hx. At this time I have counseled patient on conservative approach and close monitoring with the plan to reassess at a visit as needed. Patient is to rest, but I still encourage him to use simple range of motion exercises as well. Okay to use heat/ice to area. Discussed safe use of over-the-counter topical or pain relieving agents. 4. Viral URI  Acute onset mild illness c/w viral etiology and/or allergies. Exam nonfocal and no red flags on hx. Discussed expected course of illness and OTC agents that can be used for symptomatic relief. Given precautions and answered all questions. Total time spent: Over 30 minutes.  This includes time spent with the patient during the visit as well as time spent before and after the visit reviewing the chart, reviewing past/present studies and documenting the encounter. Return if symptoms worsen or fail to improve. Beulah Kelly, DO     Please note that this chart was generated using dragon dictation software. Although every effort was made to ensure the accuracy of this automated transcription, some errors in transcription may have occurred.

## 2023-04-24 ENCOUNTER — OFFICE VISIT (OUTPATIENT)
Dept: ORTHOPEDIC SURGERY | Age: 38
End: 2023-04-24
Payer: COMMERCIAL

## 2023-04-24 VITALS — HEIGHT: 75 IN | WEIGHT: 223 LBS | BODY MASS INDEX: 27.73 KG/M2

## 2023-04-24 DIAGNOSIS — Z98.890 S/P LEFT KNEE ARTHROSCOPY: Primary | ICD-10-CM

## 2023-04-24 PROCEDURE — 99213 OFFICE O/P EST LOW 20 MIN: CPT | Performed by: ORTHOPAEDIC SURGERY

## 2023-04-24 NOTE — PROGRESS NOTES
Chief Complaint  Follow-up (Left knee. S/p 8 months chondral defect )      History of Present Illness:  Yobani Maloney is a pleasant 30 y.o. male who presents today for follow up evaluation of left knee. He is 8 months status post left knee arthroscopy with chondroplasty and synovectomy, open patellar tendon cartilage transplant on 8/30/2022. Overall, he reports he is doing well, no issues or concerns. He has not had any return of the crepitus that was previously present. Denies any new injuries. Medical History:  Patient's medications, allergies, past medical, surgical, social and family histories were reviewed and updated as appropriate. Pertinent items are noted in HPI  Review of systems reviewed from Patient History Form completed today and available in the patient's chart under the Media tab. Pain Assessment  Location of Pain: Knee  Location Modifiers: Left  Severity of Pain: 0  Quality of Pain:  (n/A)  Duration of Pain:  (n/a)  Frequency of Pain:  (n/a)  Aggravating Factors:  (intense rehab)  Limiting Behavior: Some  Relieving Factors: Rest  Result of Injury: No  Work-Related Injury: No  Are there other pain locations you wish to document?: No    Past Medical History:   Diagnosis Date    Environmental allergies 6/14/2021        Past Surgical History:   Procedure Laterality Date    ANTERIOR CRUCIATE LIGAMENT REPAIR  03/22/2012    left knee    INGUINAL HERNIA REPAIR Right     KNEE ARTHROSCOPY Left 8/30/2022    LEFT KNEE ARTHROSCOPY, CHONDROPLASTY, SYNOVECTIONY, OPEN PATELLA CARTILAGE TRANSPLANT performed by Larry Barker MD at [facility] ARTHROSCOPY W/ ACL RECONSTRUCTION Left     KNEE ARTHROTOMY Left 8/30/2022    .  performed by Larry Barker MD at [address] OR       Family History   Problem Relation Age of Onset    Arrhythmia Mother         A Fib    No Known Problems Brother     Heart Disease Maternal Grandfather        Social History     Socioeconomic History    Marital status:

## 2023-04-25 ENCOUNTER — OFFICE VISIT (OUTPATIENT)
Dept: PRIMARY CARE CLINIC | Age: 38
End: 2023-04-25
Payer: COMMERCIAL

## 2023-04-25 VITALS
BODY MASS INDEX: 28.37 KG/M2 | SYSTOLIC BLOOD PRESSURE: 99 MMHG | DIASTOLIC BLOOD PRESSURE: 59 MMHG | HEART RATE: 80 BPM | WEIGHT: 227 LBS | OXYGEN SATURATION: 96 % | TEMPERATURE: 98.3 F

## 2023-04-25 DIAGNOSIS — J20.9 ACUTE BRONCHITIS, UNSPECIFIED ORGANISM: Primary | ICD-10-CM

## 2023-04-25 DIAGNOSIS — R05.1 ACUTE COUGH: ICD-10-CM

## 2023-04-25 PROCEDURE — 99213 OFFICE O/P EST LOW 20 MIN: CPT | Performed by: FAMILY MEDICINE

## 2023-04-25 RX ORDER — PREDNISONE 20 MG/1
40 TABLET ORAL DAILY
Qty: 10 TABLET | Refills: 0 | Status: SHIPPED | OUTPATIENT
Start: 2023-04-25 | End: 2023-04-30

## 2023-04-25 RX ORDER — AZITHROMYCIN 250 MG/1
250 TABLET, FILM COATED ORAL SEE ADMIN INSTRUCTIONS
Qty: 6 TABLET | Refills: 0 | Status: SHIPPED | OUTPATIENT
Start: 2023-04-25 | End: 2023-04-30

## 2023-04-25 NOTE — PATIENT INSTRUCTIONS
[facility] Laboratory Locations - No appointment necessary. @ indicates the location is open Saturdays in addition to Monday through Friday. Call your preferred location for test preparation, business hours and other information you need. [employer] accepts [employer]. [facility]     @ [address]. [address] [facility], [address]    Ph: 28 [facility] [place], [address]    Ph: [phone]   @ [address] [place] [address].[facility]    Ph: [place]    Ph: [phone]  @ [address]   Ph: [phone]  @ [address] [place] [address]. [facility]    Ph: [address] [place], [place] [address]Yvette garcia [address]   Ph: [phone]    [place]    @ [facility]. [place] [address]    Ph: [phone]  [facility]   [address] [facility], [address]   Ph: [address] 30: [address] [facility] [address]    Ph: [phone]   [facility]   [address] 20[address] [facility]. [address]   Ph: [address] [facility]  [address] [place], [place] [address]    Ph: [phone]

## 2023-04-25 NOTE — PROGRESS NOTES
[address] [facility] [address] PRIMARY CARE  [address] [place] [address]  Dept: [phone]  Dept Fax: [phone]     4/25/2023      Socorro Mcneil   1985     Chief Complaint   Patient presents with    Cough     Sore throat,  allergies? COVID negative one week ago       HPI  Pt comes in today for URI/allergies about 10 days ago. Sore throat and cough seems to have progressed. Worse in the night and coughing up mucous. No fevers. Slight SOB. PHQ Scores 2/28/2023 8/11/2022 6/14/2021   PHQ2 Score 0 0 0   PHQ9 Score 0 0 0     Interpretation of Total Score Depression Severity: 1-4 = Minimal depression, 5-9 = Mild depression, 10-14 = Moderate depression, 15-19 = Moderately severe depression, 20-27 = Severe depression     Prior to Visit Medications    Medication Sig Taking? Authorizing Provider   Cetirizine HCl (ZYRTEC PO) Take by mouth Yes Historical Provider, MD   fluticasone (FLONASE) 50 MCG/ACT nasal spray 1 spray by Each Nostril route daily Yes Historical Provider, MD       Past Medical History:   Diagnosis Date    Environmental allergies 6/14/2021        Social History     Tobacco Use    Smoking status: Never    Smokeless tobacco: Never   Substance Use Topics    Alcohol use: Yes     Alcohol/week: 0.0 standard drinks     Types: 5 - 8 Cans of beer per week     Comment: weekends    Drug use: No        Past Surgical History:   Procedure Laterality Date    ANTERIOR CRUCIATE LIGAMENT REPAIR  03/22/2012    left knee    INGUINAL HERNIA REPAIR Right     KNEE ARTHROSCOPY Left 8/30/2022    LEFT KNEE ARTHROSCOPY, CHONDROPLASTY, SYNOVECTIONY, OPEN PATELLA CARTILAGE TRANSPLANT performed by Newton Marroquin MD at [address] [place] [address] ARTHROSCOPY W/ ACL RECONSTRUCTION Left     KNEE ARTHROTOMY Left 8/30/2022    .  performed by Newton Marroquin MD at [facility] OR        No Known Allergies     Family History   Problem Relation Age of Onset    Arrhythmia Mother         A Fib    No Known Problems Brother

## 2023-09-11 ENCOUNTER — OFFICE VISIT (OUTPATIENT)
Dept: ORTHOPEDIC SURGERY | Age: 38
End: 2023-09-11
Payer: COMMERCIAL

## 2023-09-11 DIAGNOSIS — M25.562 LEFT KNEE PAIN, UNSPECIFIED CHRONICITY: Primary | ICD-10-CM

## 2023-09-11 PROCEDURE — 99214 OFFICE O/P EST MOD 30 MIN: CPT | Performed by: ORTHOPAEDIC SURGERY

## 2023-09-11 NOTE — PROGRESS NOTES
Date:  2023    Name:  Bernardo Meza  Address:  [place] [address]    :  1985      Age:   41 y.o.    SSN:        Medical Record Number:  [id number]    Reason for Visit:    Chief Complaint    Knee Pain (Left )      DOS:2023     HPI: Essence Edward is a 41 y.o. male here today for an evaluation of his left knee. Of note he underwent a left knee arthroscopy with chondroplasty and synovectomy, open patellar tendon cartilage transplant on 2022. He comes in today reporting that 3 weeks ago he was walking down stairs and slipped and left knee went up under him. Since then, he has been experiencing sharp medial pain mainly when going down stairs. He also reports occasional feelings of instability and swelling. Patient denies popping, catching or locking. He denies pain keeping him up at night. He is here today to discuss the nature of his injury and treatment options. Pain Assessment  Location of Pain: Knee  Location Modifiers: Left  Severity of Pain: 2  Quality of Pain: Sharp  Duration of Pain: Persistent  Frequency of Pain: Several times daily  Aggravating Factors: Bending, Stretching, Straightening, Exercise  Limiting Behavior: Yes  Relieving Factors: Rest  Result of Injury: Yes  ROS: All systems reviewed on patient intake form. Pertinent items are noted in HPI. Past Medical History:   Diagnosis Date    Environmental allergies 2021        Past Surgical History:   Procedure Laterality Date    ANTERIOR CRUCIATE LIGAMENT REPAIR  2012    left knee    INGUINAL HERNIA REPAIR Right     KNEE ARTHROSCOPY Left 2022    LEFT KNEE ARTHROSCOPY, CHONDROPLASTY, SYNOVECTIONY, OPEN PATELLA CARTILAGE TRANSPLANT performed by Romeo Miller MD at [address] [place] [address] ARTHROSCOPY W/ ACL RECONSTRUCTION Left     KNEE ARTHROTOMY Left 2022    .  performed by Romeo Miller MD at [facility] OR       Family History   Problem Relation Age of Onset    Arrhythmia Mother

## 2023-12-04 ENCOUNTER — TELEPHONE (OUTPATIENT)
Dept: DERMATOLOGY | Age: 38
End: 2023-12-04

## 2023-12-04 NOTE — TELEPHONE ENCOUNTER
Pt is calling. He says his wife Manav Roberto 6/1/90 is a pt of Dr. Sandy Krishnamurthy. He is wondering if Dr. Sandy Krishnamurthy could see him as a pt? Spot on back of neck also wants a skin check.

## 2023-12-06 NOTE — TELEPHONE ENCOUNTER
LM explaining that unfortunately we do not have any current openings but to call our office back after the first of the year to see about availability.

## 2024-01-04 ENCOUNTER — TELEPHONE (OUTPATIENT)
Dept: DERMATOLOGY | Age: 39
End: 2024-01-04

## 2024-01-04 NOTE — TELEPHONE ENCOUNTER
Pt calling to see if he can get in as a new patient.Called in Dec and was told to try again after the first of the year.  Please call him at  [phone]

## 2024-01-11 ENCOUNTER — OFFICE VISIT (OUTPATIENT)
Dept: DERMATOLOGY | Age: 39
End: 2024-01-11

## 2024-01-11 DIAGNOSIS — L72.0 EPIDERMAL CYST: ICD-10-CM

## 2024-01-11 DIAGNOSIS — D22.9 BENIGN NEVUS: ICD-10-CM

## 2024-01-11 DIAGNOSIS — L72.0 EPIDERMAL CYST: Primary | ICD-10-CM

## 2024-01-11 DIAGNOSIS — L57.8 DIFFUSE PHOTODAMAGE OF SKIN: ICD-10-CM

## 2024-01-11 DIAGNOSIS — D48.5 NEOPLASM OF UNCERTAIN BEHAVIOR OF SKIN: Primary | ICD-10-CM

## 2024-01-11 NOTE — PROGRESS NOTES
[facility]at[facility]  Nessa Reid M.D.  [phone]       Chad Aguiar  1985    38 y.o. male     Date of Visit: 1/11/2024    Chief Complaint:   Chief Complaint   Patient presents with    Skin Lesion        I was asked to see this patient by Dr. Leahy ref. provider found.    History of Present Illness:  1. TBSE    Multiple nevi. Patient has not noticed any new or changing pigmented lesions.   Stable in size, shape, color. Not itching, bleeding.     Photodamage.  Progressive freckling and lentigines of sun exposed areas.  Patient is wearing hats and sunscreen consistently.     Epi cyst post neck for many years- may have increased in size. Used to drain lesion, no longer drains. I and D by PCP previously.         Wife is a patient-Layne Aguiar, and also her parents    Review of Systems:  Constitutional: Reports general sense of well-being       Past Medical History, Surgical History, Family History, Medications and Allergies reviewed.    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, helping startups   Tobacco Use    Smoking status: Never    Smokeless tobacco: Never   Substance and Sexual Activity    Alcohol use: Yes     Alcohol/week: 0.0 standard drinks of alcohol     Types: 5 - 8 Cans of beer per week     Comment: weekends    Drug use: No    Sexual activity: Yes     Partners: Female   Other Topics Concern    Not on file   Social History Narrative    Not on file     Social Determinants of Health     Financial Resource Strain: Low Risk  (2/28/2023)    Overall Financial Resource Strain (CARDIA)     Difficulty of Paying Living Expenses: Not hard at all   Food Insecurity: Not on file (2/28/2023)   Transportation Needs: Unknown (2/28/2023)    PRAPARE - Transportation     Lack of

## 2024-01-17 ENCOUNTER — TELEPHONE (OUTPATIENT)
Dept: DERMATOLOGY | Age: 39
End: 2024-01-17

## 2024-01-18 NOTE — TELEPHONE ENCOUNTER
Informed patient of biopsy results.   Patient verbalized understanding.   Patient stated he had  not received a call from Dr. Hai Fong's office yet to get scheduled. Called Dr. Fong's office spoke to Elva his assistant she stated she will call patient today to get scheduled informed patient with [employer] message per patient's request.

## 2024-02-15 LAB
CHOLESTEROL, TOTAL: 167 MG/DL
CHOLESTEROL/HDL RATIO: 3.7
HDLC SERPL-MCNC: 45 MG/DL (ref 35–70)
LDL CHOLESTEROL CALCULATED: 107 MG/DL (ref 0–160)
NONHDLC SERPL-MCNC: NORMAL MG/DL
TRIGL SERPL-MCNC: 77 MG/DL
VLDLC SERPL CALC-MCNC: NORMAL MG/DL

## 2024-04-23 ENCOUNTER — OFFICE VISIT (OUTPATIENT)
Dept: ENT CLINIC | Age: 39
End: 2024-04-23
Payer: COMMERCIAL

## 2024-04-23 VITALS
HEIGHT: 74 IN | WEIGHT: 222.2 LBS | TEMPERATURE: 97.3 F | DIASTOLIC BLOOD PRESSURE: 71 MMHG | HEART RATE: 60 BPM | RESPIRATION RATE: 16 BRPM | SYSTOLIC BLOOD PRESSURE: 117 MMHG | BODY MASS INDEX: 28.52 KG/M2

## 2024-04-23 DIAGNOSIS — L72.0 EPIDERMAL CYST OF NECK: Primary | ICD-10-CM

## 2024-04-23 DIAGNOSIS — M54.2 NECK PAIN: ICD-10-CM

## 2024-04-23 PROCEDURE — 99204 OFFICE O/P NEW MOD 45 MIN: CPT | Performed by: OTOLARYNGOLOGY

## 2024-04-23 ASSESSMENT — ENCOUNTER SYMPTOMS
VOMITING: 0
SORE THROAT: 0
VOICE CHANGE: 0
BLOOD IN STOOL: 0
FACIAL SWELLING: 0
TROUBLE SWALLOWING: 0
BACK PAIN: 0
EYE ITCHING: 0
DIARRHEA: 0
CONSTIPATION: 0
RHINORRHEA: 0
CHOKING: 0
STRIDOR: 0
COLOR CHANGE: 0
SHORTNESS OF BREATH: 0
WHEEZING: 0
SINUS PRESSURE: 0
NAUSEA: 0
SINUS PAIN: 0
EYE DISCHARGE: 0
COUGH: 0
PHOTOPHOBIA: 0

## 2024-04-23 NOTE — PROGRESS NOTES
Mouth: Mucous membranes are not pale, not dry and not cyanotic. No lacerations or oral lesions.      Dentition: Normal dentition. No dental caries or dental abscesses.      Pharynx: Uvula midline. No oropharyngeal exudate, posterior oropharyngeal erythema or uvula swelling.      Tonsils: No tonsillar abscesses.   Eyes:      General: Lids are normal.         Right eye: No discharge.         Left eye: No discharge.      Extraocular Movements:      Right eye: Normal extraocular motion and no nystagmus.      Left eye: Normal extraocular motion and no nystagmus.      Conjunctiva/sclera:      Right eye: No chemosis or exudate.     Left eye: No chemosis or exudate.  Neck:      Thyroid: No thyroid mass or thyromegaly.      Vascular: Normal carotid pulses.      Trachea: No tracheal tenderness or tracheal deviation.   Cardiovascular:      Rate and Rhythm: Normal rate and regular rhythm.   Pulmonary:      Effort: No tachypnea, bradypnea or respiratory distress.      Breath sounds: No stridor.   Musculoskeletal:      Right shoulder: Normal range of motion.      Cervical back: Neck supple.   Lymphadenopathy:      Head:      Right side of head: No submental, submandibular, tonsillar, preauricular, posterior auricular or occipital adenopathy.      Left side of head: No submental, submandibular, tonsillar, preauricular, posterior auricular or occipital adenopathy.      Cervical: No cervical adenopathy.      Right cervical: No superficial, deep or posterior cervical adenopathy.     Left cervical: No superficial, deep or posterior cervical adenopathy.   Skin:     General: Skin is warm and dry.      Findings: No bruising, erythema, laceration, lesion or rash.   Neurological:      Mental Status: He is alert and oriented to person, place, and time.      Cranial Nerves: No cranial nerve deficit.   Psychiatric:         Speech: Speech normal.         Behavior: Behavior normal.           Procedure           Assessment and Plan     1.

## 2024-04-24 ENCOUNTER — PREP FOR PROCEDURE (OUTPATIENT)
Dept: ENT CLINIC | Age: 39
End: 2024-04-24

## 2024-04-24 DIAGNOSIS — L72.0 EPIDERMAL CYST OF NECK: ICD-10-CM

## 2024-04-24 DIAGNOSIS — M54.2 NECK PAIN: ICD-10-CM

## 2024-06-04 ENCOUNTER — OFFICE VISIT (OUTPATIENT)
Dept: PRIMARY CARE CLINIC | Age: 39
End: 2024-06-04
Payer: COMMERCIAL

## 2024-06-04 VITALS
SYSTOLIC BLOOD PRESSURE: 96 MMHG | TEMPERATURE: 98.4 F | HEART RATE: 65 BPM | WEIGHT: 222.8 LBS | BODY MASS INDEX: 27.7 KG/M2 | DIASTOLIC BLOOD PRESSURE: 57 MMHG | HEIGHT: 75 IN | OXYGEN SATURATION: 98 %

## 2024-06-04 DIAGNOSIS — Z01.818 PREOPERATIVE EXAMINATION: ICD-10-CM

## 2024-06-04 DIAGNOSIS — M72.2 PLANTAR FASCIITIS OF LEFT FOOT: ICD-10-CM

## 2024-06-04 DIAGNOSIS — L72.0 EPIDERMAL CYST OF NECK: Primary | ICD-10-CM

## 2024-06-04 PROCEDURE — 99214 OFFICE O/P EST MOD 30 MIN: CPT | Performed by: FAMILY MEDICINE

## 2024-06-04 PROCEDURE — G8427 DOCREV CUR MEDS BY ELIG CLIN: HCPCS | Performed by: FAMILY MEDICINE

## 2024-06-04 PROCEDURE — 1036F TOBACCO NON-USER: CPT | Performed by: FAMILY MEDICINE

## 2024-06-04 PROCEDURE — G8419 CALC BMI OUT NRM PARAM NOF/U: HCPCS | Performed by: FAMILY MEDICINE

## 2024-06-04 SDOH — ECONOMIC STABILITY: INCOME INSECURITY: HOW HARD IS IT FOR YOU TO PAY FOR THE VERY BASICS LIKE FOOD, HOUSING, MEDICAL CARE, AND HEATING?: NOT HARD AT ALL

## 2024-06-04 SDOH — ECONOMIC STABILITY: FOOD INSECURITY: WITHIN THE PAST 12 MONTHS, YOU WORRIED THAT YOUR FOOD WOULD RUN OUT BEFORE YOU GOT MONEY TO BUY MORE.: NEVER TRUE

## 2024-06-04 SDOH — ECONOMIC STABILITY: FOOD INSECURITY: WITHIN THE PAST 12 MONTHS, THE FOOD YOU BOUGHT JUST DIDN'T LAST AND YOU DIDN'T HAVE MONEY TO GET MORE.: NEVER TRUE

## 2024-06-04 ASSESSMENT — ENCOUNTER SYMPTOMS
SORE THROAT: 0
COUGH: 0
SHORTNESS OF BREATH: 0
ABDOMINAL PAIN: 0
NAUSEA: 0

## 2024-06-04 ASSESSMENT — PATIENT HEALTH QUESTIONNAIRE - PHQ9
SUM OF ALL RESPONSES TO PHQ QUESTIONS 1-9: 0
SUM OF ALL RESPONSES TO PHQ9 QUESTIONS 1 & 2: 0
1. LITTLE INTEREST OR PLEASURE IN DOING THINGS: NOT AT ALL
SUM OF ALL RESPONSES TO PHQ QUESTIONS 1-9: 0
2. FEELING DOWN, DEPRESSED OR HOPELESS: NOT AT ALL

## 2024-06-04 NOTE — PROGRESS NOTES
[facility]  [facility] PRIMARY CARE  [address] [place] [address]  Dept: [phone]  Dept Fax: [phone]     6/4/2024      Chad Aguiar   1985     Chief Complaint   Patient presents with    Pre-op Exam     Growth removal from neck, DR Fong, 6.17.24, [facility]         HPI  Pt comes in today for preop. Scheduled for skin lesion removal to neck with Dr Fong later this month. Multiple surgeries in past without complications.     In addition to this, has been dealing with some L heel pain for 1-2 months. Worse first thing in morning. No treatment at this time.         6/4/2024    11:40 AM 2/28/2023     8:50 AM 8/11/2022     4:08 PM 6/14/2021     1:15 PM   PHQ Scores   PHQ2 Score 0 0 0 0   PHQ9 Score 0 0 0 0     Interpretation of Total Score Depression Severity: 1-4 = Minimal depression, 5-9 = Mild depression, 10-14 = Moderate depression, 15-19 = Moderately severe depression, 20-27 = Severe depression     Prior to Visit Medications    Medication Sig Taking? Authorizing Provider   Cetirizine HCl (ZYRTEC PO) Take by mouth  ProviderJaspal MD   fluticasone (FLONASE) 50 MCG/ACT nasal spray 1 spray by Each Nostril route daily  ProviderJaspal MD       Past Medical History:   Diagnosis Date    Allergic rhinitis     Arthritis     Environmental allergies 06/14/2021        Social History     Tobacco Use    Smoking status: Never    Smokeless tobacco: Never   Substance Use Topics    Alcohol use: Yes     Alcohol/week: 0.0 standard drinks of alcohol     Types: 5 - 8 Cans of beer per week     Comment: weekends    Drug use: No        Past Surgical History:   Procedure Laterality Date    ANTERIOR CRUCIATE LIGAMENT REPAIR  03/22/2012    left knee    INGUINAL HERNIA REPAIR Right     KNEE ARTHROSCOPY Left 08/30/2022    LEFT KNEE ARTHROSCOPY, CHONDROPLASTY, SYNOVECTIONY, OPEN PATELLA CARTILAGE TRANSPLANT performed by Goyo Stevens MD at [facility] OR    KNEE ARTHROSCOPY W/ ACL

## 2024-06-07 PROBLEM — M54.2 NECK PAIN: Status: ACTIVE | Noted: 2024-04-24

## 2024-06-16 RX ORDER — SODIUM CHLORIDE 9 MG/ML
INJECTION, SOLUTION INTRAVENOUS PRN
Status: CANCELLED | OUTPATIENT
Start: 2024-06-17

## 2024-06-16 RX ORDER — SODIUM CHLORIDE 0.9 % (FLUSH) 0.9 %
5-40 SYRINGE (ML) INJECTION EVERY 12 HOURS SCHEDULED
Status: CANCELLED | OUTPATIENT
Start: 2024-06-17

## 2024-06-16 RX ORDER — SODIUM CHLORIDE 0.9 % (FLUSH) 0.9 %
5-40 SYRINGE (ML) INJECTION PRN
Status: CANCELLED | OUTPATIENT
Start: 2024-06-17

## 2024-06-17 ENCOUNTER — HOSPITAL ENCOUNTER (OUTPATIENT)
Age: 39
Setting detail: OUTPATIENT SURGERY
Discharge: HOME | End: 2024-06-17
Attending: OTOLARYNGOLOGY | Admitting: OTOLARYNGOLOGY
Payer: COMMERCIAL

## 2024-06-17 ENCOUNTER — ANESTHESIA EVENT (OUTPATIENT)
Dept: OPERATING ROOM | Age: 39
End: 2024-06-17
Payer: COMMERCIAL

## 2024-06-17 ENCOUNTER — ANESTHESIA (OUTPATIENT)
Dept: OPERATING ROOM | Age: 39
End: 2024-06-17
Payer: COMMERCIAL

## 2024-06-17 VITALS
OXYGEN SATURATION: 98 % | DIASTOLIC BLOOD PRESSURE: 72 MMHG | SYSTOLIC BLOOD PRESSURE: 111 MMHG | HEART RATE: 60 BPM | HEIGHT: 74 IN | WEIGHT: 226 LBS | BODY MASS INDEX: 29 KG/M2 | TEMPERATURE: 97 F | RESPIRATION RATE: 16 BRPM

## 2024-06-17 DIAGNOSIS — L72.0 EPIDERMAL CYST OF NECK: ICD-10-CM

## 2024-06-17 DIAGNOSIS — M54.2 NECK PAIN: ICD-10-CM

## 2024-06-17 PROCEDURE — A4217 STERILE WATER/SALINE, 500 ML: HCPCS | Performed by: OTOLARYNGOLOGY

## 2024-06-17 PROCEDURE — 2500000003 HC RX 250 WO HCPCS: Performed by: NURSE ANESTHETIST, CERTIFIED REGISTERED

## 2024-06-17 PROCEDURE — 7100000010 HC PHASE II RECOVERY - FIRST 15 MIN: Performed by: OTOLARYNGOLOGY

## 2024-06-17 PROCEDURE — 3600000004 HC SURGERY LEVEL 4 BASE: Performed by: OTOLARYNGOLOGY

## 2024-06-17 PROCEDURE — 11424 EXC H-F-NK-SP B9+MARG 3.1-4: CPT | Performed by: OTOLARYNGOLOGY

## 2024-06-17 PROCEDURE — 7100000001 HC PACU RECOVERY - ADDTL 15 MIN: Performed by: OTOLARYNGOLOGY

## 2024-06-17 PROCEDURE — 3700000000 HC ANESTHESIA ATTENDED CARE: Performed by: OTOLARYNGOLOGY

## 2024-06-17 PROCEDURE — 3600000014 HC SURGERY LEVEL 4 ADDTL 15MIN: Performed by: OTOLARYNGOLOGY

## 2024-06-17 PROCEDURE — 2580000003 HC RX 258: Performed by: OTOLARYNGOLOGY

## 2024-06-17 PROCEDURE — 2580000003 HC RX 258: Performed by: ANESTHESIOLOGY

## 2024-06-17 PROCEDURE — 13132 CMPLX RPR F/C/C/M/N/AX/G/H/F: CPT | Performed by: OTOLARYNGOLOGY

## 2024-06-17 PROCEDURE — 2709999900 HC NON-CHARGEABLE SUPPLY: Performed by: OTOLARYNGOLOGY

## 2024-06-17 PROCEDURE — 7100000000 HC PACU RECOVERY - FIRST 15 MIN: Performed by: OTOLARYNGOLOGY

## 2024-06-17 PROCEDURE — 6370000000 HC RX 637 (ALT 250 FOR IP): Performed by: OTOLARYNGOLOGY

## 2024-06-17 PROCEDURE — 6360000002 HC RX W HCPCS: Performed by: NURSE ANESTHETIST, CERTIFIED REGISTERED

## 2024-06-17 PROCEDURE — 2580000003 HC RX 258: Performed by: NURSE ANESTHETIST, CERTIFIED REGISTERED

## 2024-06-17 PROCEDURE — 6360000002 HC RX W HCPCS: Performed by: OTOLARYNGOLOGY

## 2024-06-17 PROCEDURE — 88304 TISSUE EXAM BY PATHOLOGIST: CPT

## 2024-06-17 PROCEDURE — 7100000011 HC PHASE II RECOVERY - ADDTL 15 MIN: Performed by: OTOLARYNGOLOGY

## 2024-06-17 PROCEDURE — 2500000003 HC RX 250 WO HCPCS: Performed by: OTOLARYNGOLOGY

## 2024-06-17 PROCEDURE — 3700000001 HC ADD 15 MINUTES (ANESTHESIA): Performed by: OTOLARYNGOLOGY

## 2024-06-17 RX ORDER — SODIUM CHLORIDE, SODIUM LACTATE, POTASSIUM CHLORIDE, CALCIUM CHLORIDE 600; 310; 30; 20 MG/100ML; MG/100ML; MG/100ML; MG/100ML
INJECTION, SOLUTION INTRAVENOUS CONTINUOUS PRN
Status: DISCONTINUED | OUTPATIENT
Start: 2024-06-17 | End: 2024-06-17 | Stop reason: SDUPTHER

## 2024-06-17 RX ORDER — ONDANSETRON 2 MG/ML
INJECTION INTRAMUSCULAR; INTRAVENOUS PRN
Status: DISCONTINUED | OUTPATIENT
Start: 2024-06-17 | End: 2024-06-17 | Stop reason: SDUPTHER

## 2024-06-17 RX ORDER — OXYCODONE HYDROCHLORIDE 5 MG/1
5 TABLET ORAL PRN
Status: DISCONTINUED | OUTPATIENT
Start: 2024-06-17 | End: 2024-06-17 | Stop reason: HOSPADM

## 2024-06-17 RX ORDER — SODIUM CHLORIDE 0.9 % (FLUSH) 0.9 %
5-40 SYRINGE (ML) INJECTION EVERY 12 HOURS SCHEDULED
Status: DISCONTINUED | OUTPATIENT
Start: 2024-06-17 | End: 2024-06-17 | Stop reason: HOSPADM

## 2024-06-17 RX ORDER — SODIUM CHLORIDE 9 MG/ML
INJECTION, SOLUTION INTRAVENOUS PRN
Status: DISCONTINUED | OUTPATIENT
Start: 2024-06-17 | End: 2024-06-17 | Stop reason: HOSPADM

## 2024-06-17 RX ORDER — PROCHLORPERAZINE EDISYLATE 5 MG/ML
5 INJECTION INTRAMUSCULAR; INTRAVENOUS
Status: DISCONTINUED | OUTPATIENT
Start: 2024-06-17 | End: 2024-06-17 | Stop reason: HOSPADM

## 2024-06-17 RX ORDER — LIDOCAINE HYDROCHLORIDE 20 MG/ML
INJECTION, SOLUTION INFILTRATION; PERINEURAL PRN
Status: DISCONTINUED | OUTPATIENT
Start: 2024-06-17 | End: 2024-06-17 | Stop reason: SDUPTHER

## 2024-06-17 RX ORDER — LIDOCAINE HYDROCHLORIDE AND EPINEPHRINE 10; 10 MG/ML; UG/ML
INJECTION, SOLUTION INFILTRATION; PERINEURAL PRN
Status: DISCONTINUED | OUTPATIENT
Start: 2024-06-17 | End: 2024-06-17 | Stop reason: HOSPADM

## 2024-06-17 RX ORDER — ROCURONIUM BROMIDE 10 MG/ML
INJECTION, SOLUTION INTRAVENOUS PRN
Status: DISCONTINUED | OUTPATIENT
Start: 2024-06-17 | End: 2024-06-17 | Stop reason: SDUPTHER

## 2024-06-17 RX ORDER — SODIUM CHLORIDE, SODIUM LACTATE, POTASSIUM CHLORIDE, CALCIUM CHLORIDE 600; 310; 30; 20 MG/100ML; MG/100ML; MG/100ML; MG/100ML
INJECTION, SOLUTION INTRAVENOUS CONTINUOUS
Status: DISCONTINUED | OUTPATIENT
Start: 2024-06-17 | End: 2024-06-17 | Stop reason: HOSPADM

## 2024-06-17 RX ORDER — NALOXONE HYDROCHLORIDE 0.4 MG/ML
INJECTION, SOLUTION INTRAMUSCULAR; INTRAVENOUS; SUBCUTANEOUS PRN
Status: DISCONTINUED | OUTPATIENT
Start: 2024-06-17 | End: 2024-06-17 | Stop reason: HOSPADM

## 2024-06-17 RX ORDER — FENTANYL CITRATE 50 UG/ML
INJECTION, SOLUTION INTRAMUSCULAR; INTRAVENOUS PRN
Status: DISCONTINUED | OUTPATIENT
Start: 2024-06-17 | End: 2024-06-17 | Stop reason: SDUPTHER

## 2024-06-17 RX ORDER — HYDROMORPHONE HYDROCHLORIDE 1 MG/ML
0.5 INJECTION, SOLUTION INTRAMUSCULAR; INTRAVENOUS; SUBCUTANEOUS EVERY 5 MIN PRN
Status: DISCONTINUED | OUTPATIENT
Start: 2024-06-17 | End: 2024-06-17 | Stop reason: HOSPADM

## 2024-06-17 RX ORDER — SODIUM CHLORIDE 0.9 % (FLUSH) 0.9 %
5-40 SYRINGE (ML) INJECTION PRN
Status: DISCONTINUED | OUTPATIENT
Start: 2024-06-17 | End: 2024-06-17 | Stop reason: HOSPADM

## 2024-06-17 RX ORDER — PROPOFOL 10 MG/ML
INJECTION, EMULSION INTRAVENOUS PRN
Status: DISCONTINUED | OUTPATIENT
Start: 2024-06-17 | End: 2024-06-17 | Stop reason: SDUPTHER

## 2024-06-17 RX ORDER — DEXAMETHASONE SODIUM PHOSPHATE 4 MG/ML
INJECTION, SOLUTION INTRA-ARTICULAR; INTRALESIONAL; INTRAMUSCULAR; INTRAVENOUS; SOFT TISSUE PRN
Status: DISCONTINUED | OUTPATIENT
Start: 2024-06-17 | End: 2024-06-17 | Stop reason: SDUPTHER

## 2024-06-17 RX ORDER — GINSENG 100 MG
CAPSULE ORAL PRN
Status: DISCONTINUED | OUTPATIENT
Start: 2024-06-17 | End: 2024-06-17 | Stop reason: ALTCHOICE

## 2024-06-17 RX ORDER — LABETALOL HYDROCHLORIDE 5 MG/ML
10 INJECTION, SOLUTION INTRAVENOUS
Status: DISCONTINUED | OUTPATIENT
Start: 2024-06-17 | End: 2024-06-17 | Stop reason: HOSPADM

## 2024-06-17 RX ORDER — FENTANYL CITRATE 50 UG/ML
25 INJECTION, SOLUTION INTRAMUSCULAR; INTRAVENOUS EVERY 5 MIN PRN
Status: DISCONTINUED | OUTPATIENT
Start: 2024-06-17 | End: 2024-06-17 | Stop reason: HOSPADM

## 2024-06-17 RX ORDER — OXYCODONE HYDROCHLORIDE 5 MG/1
10 TABLET ORAL PRN
Status: DISCONTINUED | OUTPATIENT
Start: 2024-06-17 | End: 2024-06-17 | Stop reason: HOSPADM

## 2024-06-17 RX ORDER — MAGNESIUM HYDROXIDE 1200 MG/15ML
LIQUID ORAL CONTINUOUS PRN
Status: DISCONTINUED | OUTPATIENT
Start: 2024-06-17 | End: 2024-06-17 | Stop reason: HOSPADM

## 2024-06-17 RX ORDER — MIDAZOLAM HYDROCHLORIDE 1 MG/ML
INJECTION INTRAMUSCULAR; INTRAVENOUS PRN
Status: DISCONTINUED | OUTPATIENT
Start: 2024-06-17 | End: 2024-06-17 | Stop reason: SDUPTHER

## 2024-06-17 RX ADMIN — DEXAMETHASONE SODIUM PHOSPHATE 4 MG: 4 INJECTION INTRA-ARTICULAR; INTRALESIONAL; INTRAMUSCULAR; INTRAVENOUS; SOFT TISSUE at 10:47

## 2024-06-17 RX ADMIN — ROCURONIUM BROMIDE 50 MG: 10 INJECTION, SOLUTION INTRAVENOUS at 10:41

## 2024-06-17 RX ADMIN — FENTANYL CITRATE 100 MCG: 50 INJECTION, SOLUTION INTRAMUSCULAR; INTRAVENOUS at 10:38

## 2024-06-17 RX ADMIN — ONDANSETRON 4 MG: 2 INJECTION INTRAMUSCULAR; INTRAVENOUS at 10:47

## 2024-06-17 RX ADMIN — LIDOCAINE HYDROCHLORIDE 100 MG: 20 INJECTION, SOLUTION INFILTRATION; PERINEURAL at 10:38

## 2024-06-17 RX ADMIN — SODIUM CHLORIDE, SODIUM LACTATE, POTASSIUM CHLORIDE, AND CALCIUM CHLORIDE: .6; .31; .03; .02 INJECTION, SOLUTION INTRAVENOUS at 10:34

## 2024-06-17 RX ADMIN — WATER 200 MG: 1 INJECTION INTRAMUSCULAR; INTRAVENOUS; SUBCUTANEOUS at 10:44

## 2024-06-17 RX ADMIN — PHENYLEPHRINE HYDROCHLORIDE 100 MCG: 10 INJECTION INTRAVENOUS at 11:10

## 2024-06-17 RX ADMIN — PHENYLEPHRINE HYDROCHLORIDE 200 MCG: 10 INJECTION INTRAVENOUS at 10:46

## 2024-06-17 RX ADMIN — PHENYLEPHRINE HYDROCHLORIDE 200 MCG: 10 INJECTION INTRAVENOUS at 10:54

## 2024-06-17 RX ADMIN — SUGAMMADEX 200 MG: 100 INJECTION, SOLUTION INTRAVENOUS at 11:18

## 2024-06-17 RX ADMIN — SODIUM CHLORIDE, POTASSIUM CHLORIDE, SODIUM LACTATE AND CALCIUM CHLORIDE: 600; 310; 30; 20 INJECTION, SOLUTION INTRAVENOUS at 08:45

## 2024-06-17 RX ADMIN — PHENYLEPHRINE HYDROCHLORIDE 200 MCG: 10 INJECTION INTRAVENOUS at 10:58

## 2024-06-17 RX ADMIN — MIDAZOLAM HYDROCHLORIDE 2 MG: 2 INJECTION, SOLUTION INTRAMUSCULAR; INTRAVENOUS at 10:34

## 2024-06-17 RX ADMIN — PROPOFOL 200 MG: 10 INJECTION, EMULSION INTRAVENOUS at 10:40

## 2024-06-17 ASSESSMENT — PAIN SCALES - GENERAL
PAINLEVEL_OUTOF10: 0

## 2024-06-17 ASSESSMENT — LIFESTYLE VARIABLES: SMOKING_STATUS: 0

## 2024-06-17 NOTE — OP NOTE
Operative Note      Patient: Jaya Aguiar  YOB: 1985  MRN: [id number]    Date of Procedure: 6/17/2024    Pre-Op Diagnosis Codes:     * Epidermal cyst of neck [L72.0]     * Neck pain [M54.2]    Post-Op Diagnosis: Same       Procedure(s):  Excision benign neoplasm skin of neck 3.1 to 4.0 centimeters, left; complex layered closure skin of neck 2.6 to 7.5 centimeters, left    Surgeon(s):  Hai Fong DO    Assistant:   Resident: Emerson Mckoy DO    Anesthesia: General    Estimated Blood Loss (mL): Minimal    Complications: None    Specimens:   ID Type Source Tests Collected by Time Destination   A : POSTERIOR NECK MASS Tissue Tissue SURGICAL PATHOLOGY Hai Fong DO 6/17/2024 1103        Implants:  * No implants in log *      Drains: * No LDAs found *    Findings:  Infection Present At Time Of Surgery (PATOS) (choose all levels that have infection present):  No infection present  Other Findings: Epidermal cyst. Defect measuring 3.8 centimeters in greatest diameter  This procedure was not performed to treat primary cutaneous melanoma through wide local excision    Detailed Description of Procedure:   The patient was identified in the preoperative holding area.  Verified informed consent was obtained.  A marking pen was used to jaya the surgical site.  The patient was taken to the operating suite, transferred to the operating table, sedated with general anesthesia and intubated.  The patient was rolled into a lateral recumbent position on the right.  The posterior neck mass was identified.  An elliptical incision was marked out within relaxed contention lines.  The skin was infiltrated with 1% lidocaine with 1: 100,000 epinephrine.  The patient was prepped and draped in a sterile fashion.  A proper timeout was performed.    15 blade used to make an incision in the marked out skin.  Incision was carried to the subcutaneous fat plane.  The skin was grasped with an Allis clamp.  Using Nabil

## 2024-06-17 NOTE — BRIEF OP NOTE
Brief Postoperative Note      Patient: Chad Aguiar  YOB: 1985  MRN: [id number]    Date of Procedure: 6/17/2024    Pre-Op Diagnosis Codes:     * Epidermal cyst of neck [L72.0]     * Neck pain [M54.2]    Post-Op Diagnosis: Same       Procedure(s):  Excision benign neoplasm skin of neck 3.1 to 4.0 centimeters, left; complex layered closure skin of neck 2.6 to 7.5 centimeters, left    Surgeon(s):  Hai Fong DO    Assistant:  Resident: Emerson Mckoy DO    Anesthesia: General    Estimated Blood Loss (mL): Minimal    Complications: None    Specimens:   ID Type Source Tests Collected by Time Destination   A : POSTERIOR NECK MASS Tissue Tissue SURGICAL PATHOLOGY Hai Fong DO 6/17/2024 1103        Implants:  * No implants in log *      Drains: * No LDAs found *    Findings:  Infection Present At Time Of Surgery (PATOS) (choose all levels that have infection present):  No infection present  Other Findings: epidermal cyst excised with full capsule. Approximately 3.8cm elliptical incision with undermining for wound closure. Hemostatic at end of case. Closed in layers. Nylon interlocking suture to be dressed with bacitracin and removed in 10 days.   This procedure was not performed to treat primary cutaneous melanoma through wide local excision    Electronically signed by Emerson Mckoy DO on 6/17/2024 at 11:27 AM

## 2024-06-17 NOTE — PROGRESS NOTES
Ambulatory Surgery/Procedure Discharge Note    Vitals:    06/17/24 1245   BP: 111/72   Pulse: 60   Resp: 16   Temp: 97 °F (36.1 °C)   SpO2: 98%       In: 1045 [P.O.:45; I.V.:1000]  Out: 5     Restroom use offered before discharge.  Yes    Pain assessment:  none  Pain Level: 0    Pt a&o x4. VSS. Pt denies pain and nausea. Surgical site C/D/I. Reviewed d/c instructions and removed IV.      Patient discharged to home/self care. Patient discharged via wheel chair by transporter to waiting family/S.O.       6/17/2024 1:21 PM  
Called patient's wife, Layne, at 1225 with update given along with plan for discharge. No further questions.   
PACU Transfer to [facility]    Vitals:    06/17/24 1230   BP: 112/72   Pulse: 57   Resp: 16   Temp: 98.3 °F (36.8 °C)   SpO2: 100%         Intake/Output Summary (Last 24 hours) at 6/17/2024 1233  Last data filed at 6/17/2024 1230  Gross per 24 hour   Intake 1045 ml   Output 5 ml   Net 1040 ml       Pain assessment:  none  Pain Level: 0    Patient transferred to care of [facility] RN.    6/17/2024 12:33 PM    
Patient admitted to PACU #18 from OR per stretcher at 1125 s/p Excision benign neoplasm skin of neck 3.1 to 4.0 centimeters, left; complex layered closure skin of neck 2.6 to 7.5 centimeters, left - Left. Report received at bedside in PACU per CRNA, OR nurse and Dr. Mckoy. Patient was reported to be hypotensive in the OR which he received treatment for, see anesthesia record. No complications reported. Patient connected to PACU monitoring equipment. IVF's infusing with site unremarkable. Patient arrived to PACU responsive but not fully wakeful from anesthesia with no complaints of pain. Posterior neck surgical dressing with sutures and antibiotic ointment RHETT with site well approximated and unremarkable. No further changes. Will continue to monitor.    
eating/ drinking as you will have very specific instructions to follow.  If you did not receive these, call your surgeon's office immediately.     5. MEDICATIONS:  Take the following medications with a SMALL sip of water:   Use your usual dose of inhalers the morning of surgery. BRING your rescue inhaler with you to hospital.   Anesthesia does NOT want you to take insulin the morning of surgery. They will control your blood sugar while you are at the hospital. Please contact your ordering physician for instructions regarding your insulin the night before your procedure. If you have an insulin pump, please keep it set on basal rate.   Bariatric patient's call your surgeon if on diabetic medications as some may need to be stopped 1 week prior to surgery    6. Do not swallow additional water when brushing teeth. No gum, candy, mints, or ice chips. Refrain from smoking or at least decrease the amount on day of surgery.    7. Morning of surgery:   Take a shower with an antibacterial soap (i.e., Safeguard or Dial) OR your physician may have instructed you to use Hibiclens.  Dress in loose, comfortable clothing appropriate for redressing after your procedure.   Do not wear jewelry (including body piercings), make-up (especially NO eye make-up), fingernail polish (NO toenail polish if foot/leg surgery), lotion, powders, or metal hairclips.   Do not shave or wax for 72 hours prior to procedure near your operative site. Shaving with a razor can irritate your skin and make it easier to develop an infection. On the day of your procedure, any hair that needs to be removed near the surgical site will be 'clipped' by a healthcare worker using a special clipper designed to avoid skin irritation.    8. Dentures, glasses, or contacts will need to be removed before your procedure. Bring cases for your glasses, contacts, dentures, or hearing aids to protect them while you are in surgery.      9. If you use a CPAP, please bring it with

## 2024-06-17 NOTE — DISCHARGE INSTRUCTIONS
[facility]  Hai Fong D.O.  [address]  [place], [place] [id number] [phone]    POST-OP Incision Care  Diet  Resume regular diet, as tolerated.  You may experience some nausea after surgery for up to 24 hours from the general anesthetic.  Take any pain medications with food to avoid upset stomach   Drink plenty of liquids    Activity  Avoid Straining, bending or lifting or vigorous exercise for 2 weeks  Keep the head of your bed elevated to reduce swelling for the first 72 hours  May shower 2 days after surgery.   You may bath in lukewarm water taking care not to get the incision wet    General Instructions  Drainage from the incision during the first few days is expected.  If you have excessive bleeding or green drainage with surrounding redness please call the number above.  Clean the incision with dial soap and water daily on a cotton tipped swab  Apply Bacitracin ointment to the incision twice daily for 1 week.  The ointment can be purchased over the counter.    Medications  Resume your normal medications  Take antibiotics prescribed  Take pain medications as needed for pain  DO NOT use any herbal medicines/diet pills for two weeks after surgery.    Call the Office  Fever greater than 100.4  Sudden increase in pain              [facility] AMBULATORY PROCEDURE DISCHARGE INSTRUCTIONS    There are potential side effects of anesthesia or sedation you may experience for the first 24 hours.  These side effects include:    Confusion or Memory loss, Dizziness, or Delayed Reaction Times   [x]A responsible person should be with you for the next 24 hours.  Do not operate any vehicles (automobiles, bicycles, motorcycles) or power tools or machinery for 24 hours.  Do not sign any legal documents or make any legal decisions for 24 hours. Do not drink alcohol for 24 hours or while taking narcotic pain medication.      Nausea    [x]Start with light diet and progress to your normal diet

## 2024-06-17 NOTE — ANESTHESIA POSTPROCEDURE EVALUATION
Department of Anesthesiology  Postprocedure Note    Patient: Chad Aguiar  MRN: [id number]  YOB: 1985  Date of evaluation: 6/17/2024    Procedure Summary       Date: 06/17/24 Room / Location: [address]    Anesthesia Start: 1034 Anesthesia Stop: 1128    Procedure: Excision benign neoplasm skin of neck 3.1 to 4.0 centimeters, left; complex layered closure skin of neck 2.6 to 7.5 centimeters, left (Left: Neck) Diagnosis:       Epidermal cyst of neck      Neck pain      (Epidermal cyst of neck [L72.0])      (Neck pain [M54.2])    Surgeons: Hai Fong DO Responsible Provider: Daniel Jacobson DO    Anesthesia Type: general ASA Status: 1            Anesthesia Type: No value filed.    Ritesh Phase I: Ritesh Score: 10    Ritesh Phase II: Ritesh Score: 10    Vitals:    06/17/24 1245   BP: 111/72   Pulse: 60   Resp: 16   Temp: 97 °F (36.1 °C)   SpO2: 98%       Anesthesia Post Evaluation    Patient location during evaluation: PACU  Patient participation: complete - patient participated  Level of consciousness: awake and awake and alert  Pain score: 1  Airway patency: patent  Nausea & Vomiting: no nausea and no vomiting  Cardiovascular status: hemodynamically stable  Respiratory status: acceptable  Hydration status: euvolemic  Pain management: adequate and satisfactory to patient        No notable events documented.

## 2024-06-26 ENCOUNTER — OFFICE VISIT (OUTPATIENT)
Dept: ENT CLINIC | Age: 39
End: 2024-06-26

## 2024-06-26 VITALS
DIASTOLIC BLOOD PRESSURE: 72 MMHG | WEIGHT: 226 LBS | SYSTOLIC BLOOD PRESSURE: 116 MMHG | RESPIRATION RATE: 16 BRPM | BODY MASS INDEX: 29 KG/M2 | HEART RATE: 76 BPM | TEMPERATURE: 98.6 F | HEIGHT: 74 IN

## 2024-06-26 DIAGNOSIS — Z48.89 POSTOPERATIVE VISIT: Primary | ICD-10-CM

## 2024-06-26 DIAGNOSIS — L72.0 EPIDERMAL CYST OF NECK: ICD-10-CM

## 2024-06-26 PROCEDURE — 99024 POSTOP FOLLOW-UP VISIT: CPT | Performed by: OTOLARYNGOLOGY

## 2024-06-26 NOTE — PROGRESS NOTES
[facility]  [address]  [place] [address]  P: [phone]  F: [phone]       Patient     Chad Aguiar  1985    ChiefComplaint     Chief Complaint   Patient presents with    Post-Op Check     Follow up from surgery       History of Present Illness     Chad Aguiar is a pleasant 38 y.o. male here for postop visit for posterior neck cyst excision.  Overall doing well with no complaints or concerns.    Past Medical History     Past Medical History:   Diagnosis Date    Allergic rhinitis     Arthritis     Environmental allergies 06/14/2021       Past Surgical History     Past Surgical History:   Procedure Laterality Date    ANTERIOR CRUCIATE LIGAMENT REPAIR  03/22/2012    left knee    INGUINAL HERNIA REPAIR Right     KNEE ARTHROSCOPY Left 08/30/2022    LEFT KNEE ARTHROSCOPY, CHONDROPLASTY, SYNOVECTIONY, OPEN PATELLA CARTILAGE TRANSPLANT performed by Goyo Stevens MD at [facility] OR    KNEE ARTHROSCOPY W/ ACL RECONSTRUCTION Left     KNEE ARTHROTOMY Left 08/30/2022    . performed by Goyo Stevens MD at [facility] OR    NECK SURGERY Left 6/17/2024    Excision benign neoplasm skin of neck 3.1 to 4.0 centimeters, left; complex layered closure skin of neck 2.6 to 7.5 centimeters, left performed by Hai Fong DO at [facility] OR       Family History     Family History   Problem Relation Age of Onset    Arrhythmia Mother         A Fib    No Known Problems Brother     Heart Disease Maternal Grandfather        Social History     Social History     Socioeconomic History    Marital status:      Spouse name: Not on file    Number of children: 0    Years of education: Not on file    Highest education level: Not on file   Occupational History    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, helping startups   Tobacco Use    Smoking status: Never    Smokeless tobacco: Never   Vaping Use    Vaping Use: Never used   Substance and Sexual Activity    Alcohol use: Yes     Alcohol/week: 0.0 standard

## 2024-10-23 ENCOUNTER — OFFICE VISIT (OUTPATIENT)
Dept: SURGERY | Age: 39
End: 2024-10-23
Payer: COMMERCIAL

## 2024-10-23 VITALS
DIASTOLIC BLOOD PRESSURE: 73 MMHG | TEMPERATURE: 98.1 F | WEIGHT: 220.8 LBS | RESPIRATION RATE: 16 BRPM | BODY MASS INDEX: 28.35 KG/M2 | SYSTOLIC BLOOD PRESSURE: 108 MMHG | HEART RATE: 87 BPM | OXYGEN SATURATION: 95 %

## 2024-10-23 DIAGNOSIS — K60.2 ANAL FISSURE: Primary | ICD-10-CM

## 2024-10-23 PROCEDURE — 99204 OFFICE O/P NEW MOD 45 MIN: CPT | Performed by: SURGERY

## 2024-10-23 PROCEDURE — G8484 FLU IMMUNIZE NO ADMIN: HCPCS | Performed by: SURGERY

## 2024-10-23 PROCEDURE — 1036F TOBACCO NON-USER: CPT | Performed by: SURGERY

## 2024-10-23 PROCEDURE — G8427 DOCREV CUR MEDS BY ELIG CLIN: HCPCS | Performed by: SURGERY

## 2024-10-23 PROCEDURE — G8419 CALC BMI OUT NRM PARAM NOF/U: HCPCS | Performed by: SURGERY

## 2024-10-23 NOTE — PROGRESS NOTES
[facility] [place] SPECIALTY CARE [facility] COLORECTAL SURGERY  [address] [place] [address]  [address]  [place] [address]  Dept: [phone]  Dept Fax: [phone]  Loc: [phone]    Visit Date: 10/23/2024    Chad Aguiar is a 39 y.o. male who presents today for: New Patient (Rectal Bleeding- Referred by PCP. Patient reports having symptoms of pain and bleeding after a BM for the last year./)      HPI:       Chad Aguiar is a 39 y.o. male referred by Dr. Bellamy for anorectal discomfort.    Patient has had intermittent symptoms of anorectal pain and discomfort for several months.   Bleeding: yes  Constipation: no  Patient has tried: none  Previous similar symptoms: no  Previous anorectal surgeries: no      Family History: Family history of colorectal cancer/polyps: no    Objective:     Physical Exam   /73   Pulse 87   Temp 98.1 °F (36.7 °C) (Infrared)   Resp 16   Wt 100.2 kg (220 lb 12.8 oz)   SpO2 95%   BMI 28.35 kg/m²   Constitutional: Appears well-developed and well-nourished. Grooming appropriate. No gross deformities. Body mass index is 28.35 kg/m².    Abdominal/wound: soft, nontender    RECTAL EXAM:    Chaperone/MA present in room during entire exam. Patient was placed in lateral or knee-chest positioning depending on comfort. Exam table manipulated for proper visualization and lighting. Buttocks spread.   No evidence of anal fissure.  Digital rectal exam and anoscopy without any revealing source    Labs: None  Radiology: None  Discussion/coordination of care: None  Last colonoscopy: None    No colonoscopy on file   No cologuard on file  No FIT/FOBT on file   No flexible sigmoidoscopy on file       Assessment/Plan:     A/P:  New problem(s) with unknown prognosis: Anal fissure  Established problem(s): None  Additional workup/treatment planned: Medical therapy with prescription calcium channel blocker ointment, fiber supplementation, sitz baths, improving dietary and lifestyle

## 2024-10-24 ENCOUNTER — TELEPHONE (OUTPATIENT)
Dept: SURGERY | Age: 39
End: 2024-10-24

## 2024-10-24 NOTE — TELEPHONE ENCOUNTER
Spoke with patients wife regarding the CCB ointment wanted to figure out where would the best place to send in the ointment. I called in the CCB ointment to [facility].

## 2025-01-23 ENCOUNTER — OFFICE VISIT (OUTPATIENT)
Dept: PRIMARY CARE CLINIC | Age: 40
End: 2025-01-23

## 2025-01-23 VITALS
HEART RATE: 74 BPM | WEIGHT: 223.8 LBS | TEMPERATURE: 97.8 F | OXYGEN SATURATION: 97 % | DIASTOLIC BLOOD PRESSURE: 62 MMHG | BODY MASS INDEX: 28.73 KG/M2 | SYSTOLIC BLOOD PRESSURE: 99 MMHG

## 2025-01-23 DIAGNOSIS — R05.1 ACUTE COUGH: Primary | ICD-10-CM

## 2025-01-23 DIAGNOSIS — Z00.00 ANNUAL PHYSICAL EXAM: ICD-10-CM

## 2025-01-23 DIAGNOSIS — Z91.09 ENVIRONMENTAL ALLERGIES: ICD-10-CM

## 2025-01-23 RX ORDER — PREDNISONE 20 MG/1
40 TABLET ORAL DAILY
Qty: 10 TABLET | Refills: 0 | Status: SHIPPED | OUTPATIENT
Start: 2025-01-23 | End: 2025-01-28

## 2025-01-23 SDOH — ECONOMIC STABILITY: FOOD INSECURITY: WITHIN THE PAST 12 MONTHS, THE FOOD YOU BOUGHT JUST DIDN'T LAST AND YOU DIDN'T HAVE MONEY TO GET MORE.: NEVER TRUE

## 2025-01-23 SDOH — ECONOMIC STABILITY: FOOD INSECURITY: WITHIN THE PAST 12 MONTHS, YOU WORRIED THAT YOUR FOOD WOULD RUN OUT BEFORE YOU GOT MONEY TO BUY MORE.: NEVER TRUE

## 2025-01-23 ASSESSMENT — ENCOUNTER SYMPTOMS
COUGH: 1
SORE THROAT: 0
VOMITING: 0
WHEEZING: 0
EYE ITCHING: 0
ABDOMINAL PAIN: 0
NAUSEA: 0
DIARRHEA: 0
EYE PAIN: 0
CONSTIPATION: 0
SHORTNESS OF BREATH: 0

## 2025-01-23 ASSESSMENT — PATIENT HEALTH QUESTIONNAIRE - PHQ9
SUM OF ALL RESPONSES TO PHQ QUESTIONS 1-9: 0
2. FEELING DOWN, DEPRESSED OR HOPELESS: NOT AT ALL
SUM OF ALL RESPONSES TO PHQ9 QUESTIONS 1 & 2: 0
SUM OF ALL RESPONSES TO PHQ QUESTIONS 1-9: 0
1. LITTLE INTEREST OR PLEASURE IN DOING THINGS: NOT AT ALL

## 2025-01-23 NOTE — PATIENT INSTRUCTIONS
[facility] Locations - No appointment necessary.  ? indicates the location is open Saturdays in addition to Monday through Friday.   Call your preferred location for test preparation, business hours and other information you need.   [facility] accepts all insurances.  CENTRAL  EAST  [place]    ? [place]   [address]   [address]   [place] [address]    Ph: [phone]  [facility]   [address] [place] [address]    Ph: [phone]   ? [place]   [address]    [place] [address]    Ph: [phone]     [facility]   [address] [place] [address]    Ph: [phone] ? [place]   [address] [address]   Ph: [phone]  ? [facility]   [address] [place] [address]    Ph: [phone]      [place]   [address] [place] [place] [address]   Ph: [phone]     [facility]  [address] [place] [place] [address]   Ph: [phone]    [place]    ? [facility]   [address] [address]    Ph: [phone]  [facility]   [address] [place] [address]   Ph: [phone]  [place]   [address] [place], [place] [address]vd.   [facility], [address]    PH: [phone]    [facility]   [address] [place] [address]    Ph: [phone]  [place]  [address] [place] [address]  Ph: [phone]  [facility]   [address] [place] [address]    Ph: [phone]

## 2025-01-23 NOTE — PROGRESS NOTES
TRANSPLANT performed by Goyo Stevens MD at [facility] OR    KNEE ARTHROSCOPY W/ ACL RECONSTRUCTION Left     KNEE ARTHROTOMY Left 08/30/2022    . performed by Goyo Stevens MD at [facility] OR    NECK SURGERY Left 6/17/2024    Excision benign neoplasm skin of neck 3.1 to 4.0 centimeters, left; complex layered closure skin of neck 2.6 to 7.5 centimeters, left performed by Hai Fong DO at [facility] OR        No Known Allergies     Family History   Problem Relation Age of Onset    Arrhythmia Mother         A Fib    No Known Problems Brother     Heart Disease Maternal Grandfather         Patient's past medical history, surgical history, family history, medications, and allergies were all reviewed and updated as appropriate today.    Review of Systems   Constitutional:  Negative for fatigue, fever and unexpected weight change.   HENT:  Negative for congestion, ear pain and sore throat.    Eyes:  Negative for pain, itching and visual disturbance.   Respiratory:  Positive for cough. Negative for shortness of breath and wheezing.    Cardiovascular:  Negative for chest pain, palpitations and leg swelling.   Gastrointestinal:  Negative for abdominal pain, constipation, diarrhea, nausea and vomiting.   Endocrine: Negative for cold intolerance, heat intolerance, polydipsia and polyuria.   Genitourinary:  Negative for dysuria, frequency and hematuria.   Musculoskeletal:  Negative for arthralgias and joint swelling.   Skin:  Negative for rash.   Neurological:  Negative for dizziness and headaches.   Hematological:  Negative for adenopathy.       BP 99/62 (Cuff Size: Medium Adult)   Pulse 74   Temp 97.8 °F (36.6 °C) (Oral)   Wt 101.5 kg (223 lb 12.8 oz)   SpO2 97% Comment: room air  BMI 28.73 kg/m²      Physical Exam  Vitals reviewed.   Constitutional:       General: He is not in acute distress.     Appearance: Normal appearance. He is well-developed and normal weight.   HENT:      Head: Normocephalic and atraumatic.      Right

## 2025-03-17 LAB
ALBUMIN: NORMAL
ALP BLD-CCNC: NORMAL U/L
ALT SERPL-CCNC: 33 U/L
ANION GAP SERPL CALCULATED.3IONS-SCNC: NORMAL MMOL/L
AST SERPL-CCNC: 25 U/L
BASOPHILS ABSOLUTE: NORMAL
BASOPHILS RELATIVE PERCENT: NORMAL
BILIRUB SERPL-MCNC: NORMAL MG/DL
BUN BLDV-MCNC: 15 MG/DL
CALCIUM SERPL-MCNC: NORMAL MG/DL
CHLORIDE BLD-SCNC: NORMAL MMOL/L
CHOLESTEROL, FASTING: 199
CO2: NORMAL
CREAT SERPL-MCNC: 0.96 MG/DL
EOSINOPHILS ABSOLUTE: NORMAL
EOSINOPHILS RELATIVE PERCENT: NORMAL
GFR, ESTIMATED: 104
GLUCOSE BLD-MCNC: NORMAL MG/DL
HCT VFR BLD CALC: 46.6 % (ref 41–53)
HDLC SERPL-MCNC: 45 MG/DL (ref 35–70)
HEMOGLOBIN: 15.5 G/DL (ref 13.5–17.5)
LDL CHOLESTEROL: 131
LYMPHOCYTES ABSOLUTE: NORMAL
LYMPHOCYTES RELATIVE PERCENT: NORMAL
MCH RBC QN AUTO: NORMAL PG
MCHC RBC AUTO-ENTMCNC: NORMAL G/DL
MCV RBC AUTO: NORMAL FL
MONOCYTES ABSOLUTE: NORMAL
MONOCYTES RELATIVE PERCENT: NORMAL
NEUTROPHILS ABSOLUTE: NORMAL
NEUTROPHILS RELATIVE PERCENT: NORMAL
PLATELET # BLD: 154 K/ΜL
PMV BLD AUTO: NORMAL FL
POTASSIUM SERPL-SCNC: NORMAL MMOL/L
RBC # BLD: NORMAL 10*6/UL
SODIUM BLD-SCNC: NORMAL MMOL/L
TOTAL PROTEIN: NORMAL
TRIGLYCERIDE, FASTING: 127
WBC # BLD: 4.2 10^3/ML

## (undated) DEVICE — TRAP FLUID

## (undated) DEVICE — GLOVE ORANGE PI 7   MSG9070

## (undated) DEVICE — SEALANT SURG TISSEEL 10 ML FROZEN PRIMA

## (undated) DEVICE — SOLUTION IV 1000ML 0.9% SOD CHL

## (undated) DEVICE — TOWEL,STOP FLAG GOLD N-W: Brand: MEDLINE

## (undated) DEVICE — GLOVE ORANGE PI 7 1/2   MSG9075

## (undated) DEVICE — DRILL ENDOSCP D6MM 45DEG S STL OSSEOUS TISS POWERPICK

## (undated) DEVICE — BLADE SHV L13CM DIA4MM TAPR TIP SCIS LIKE CUT OVL OUTER

## (undated) DEVICE — TUBING FLD MGMT Y DBL SPIK DUALWAVE

## (undated) DEVICE — HEAD & NECK: Brand: MEDLINE INDUSTRIES, INC.

## (undated) DEVICE — BLADE ES ELASTOMERIC COAT INSUL DURABLE BEND UPTO 90DEG

## (undated) DEVICE — PAD,NON-ADHERENT,3X8,STERILE,LF,1/PK: Brand: MEDLINE

## (undated) DEVICE — SUTURE ETHILON SZ 5-0 L18IN NONABSORBABLE BLK L19MM PS-2 3/8 1666G

## (undated) DEVICE — TUBING SUCT 12FR MAL ALUM SHFT FN CAP VENT UNIV CONN W/ OBT

## (undated) DEVICE — TOWEL,OR,DSP,ST,BLUE,DLX,8/PK,10PK/CS: Brand: MEDLINE

## (undated) DEVICE — SUTURE NONABSORBABLE MONOFILAMENT 4-0 PS-2 18 IN BLK ETHILON 1667G

## (undated) DEVICE — TUBING PMP L16FT MAIN DISP FOR AR-6400 AR-6475

## (undated) DEVICE — SUTURE MCRYL SZ 4-0 L27IN ABSRB UD L19MM PS-2 1/2 CIR PRIM Y426H

## (undated) DEVICE — SUTURE CHROMIC GUT SZ 3-0 L27IN ABSRB BRN L26MM SH 1/2 CIR G122H

## (undated) DEVICE — COVER,TABLE,HEAVY DUTY,77"X90",STRL: Brand: MEDLINE

## (undated) DEVICE — ELECTRODE PT RET AD L9FT HI MOIST COND ADH HYDRGEL CORDED

## (undated) DEVICE — DRAPE,INSTRUMENT,MAGNETIC,10X16: Brand: MEDLINE

## (undated) DEVICE — GLOVE SURG SZ 7 L12IN FNGR THK79MIL GRN LTX FREE

## (undated) DEVICE — SOLUTION IRRIG 3000ML LAC R FLX CONT

## (undated) DEVICE — GLOVE SURG SZ 9 L12IN FNGR THK79MIL GRN LTX FREE

## (undated) DEVICE — SYSTEM CPRP CONC UP TO 18X BASELINE ADJ LEUK CONC ANGEL

## (undated) DEVICE — KNEE ARTHROSCOPY: Brand: MEDLINE INDUSTRIES, INC.

## (undated) DEVICE — TUBING PMP L6FT CONT WAVE EXTN

## (undated) DEVICE — SUTURE VCRL SZ 0 L27IN ABSRB UD L26MM CT-2 1/2 CIR J270H

## (undated) DEVICE — LIQUIBAND RAPID ADHESIVE 36/CS 0.8ML: Brand: MEDLINE

## (undated) DEVICE — APPLICATOR MEDICATED 26 CC SOLUTION HI LT ORNG CHLORAPREP

## (undated) DEVICE — INTENDED FOR TISSUE SEPARATION, AND OTHER PROCEDURES THAT REQUIRE A SHARP SURGICAL BLADE TO PUNCTURE OR CUT.: Brand: BARD-PARKER ® CARBON RIB-BACK BLADES

## (undated) DEVICE — GLOVE SURG SZ 9 L1185IN FNGR THK75MIL STRW LTX POLYMER BEAD

## (undated) DEVICE — COVER LT HNDL BLU PLAS

## (undated) DEVICE — ADHESIVE SKIN CLSR 0.7ML TOP DERMBND ADV

## (undated) DEVICE — GOWN,SIRUS,POLYRNF,BRTHSLV,XLN/XXL,18/CS: Brand: MEDLINE

## (undated) DEVICE — 3M™ COBAN™ NL STERILE NON-LATEX SELF-ADHERENT WRAP, 2086S, 6 IN X 5 YD (15 CM X 4,5 M), 12 ROLLS/CASE: Brand: 3M™ COBAN™

## (undated) DEVICE — PAD DRY FLOOR ABS 32X58IN GRN

## (undated) DEVICE — SYRINGE IRRIG 60ML SFT PLIABLE BLB EZ TO GRP 1 HND USE W/

## (undated) DEVICE — COVER,MAYO STAND,XL,STERILE: Brand: MEDLINE

## (undated) DEVICE — JEWISH HOSPITAL TURNOVER KIT: Brand: MEDLINE INDUSTRIES, INC.

## (undated) DEVICE — NEPTUNE E-SEP SMOKE EVACUATION PENCIL, COATED, 70MM BLADE, PUSH BUTTON SWITCH: Brand: NEPTUNE E-SEP

## (undated) DEVICE — GOWN,SIRUS,POLYRNF,BRTHSLV,XL,30/CS: Brand: MEDLINE

## (undated) DEVICE — GARMENT,MEDLINE,DVT,INT,CALF,MED, GEN2: Brand: MEDLINE